# Patient Record
Sex: MALE | Race: WHITE | NOT HISPANIC OR LATINO | Employment: UNEMPLOYED | URBAN - METROPOLITAN AREA
[De-identification: names, ages, dates, MRNs, and addresses within clinical notes are randomized per-mention and may not be internally consistent; named-entity substitution may affect disease eponyms.]

---

## 2017-01-03 ENCOUNTER — MYC MEDICAL ADVICE (OUTPATIENT)
Dept: ADDICTION MEDICINE | Facility: CLINIC | Age: 37
End: 2017-01-03

## 2017-01-03 NOTE — TELEPHONE ENCOUNTER
My chart message from patient on 01/03/2016    Dr Barfield,    According to the Vivitrol website my provider, you, should be able to provide me with a medical bracelet or necklace stating I am on Vivitrol. DO you have these, can I get one at my next appointment on 1/10?    I called [phone] and they will not send directly to me, only to you.    Thank you!    Ashish Ferreira  [phone]

## 2017-01-04 NOTE — TELEPHONE ENCOUNTER
Sent the patient a [employer] message that a representative from [employer] will be dropping off bracelets at our office.  He has an appointment with me on 1/10/2017.     Analy Barfield MD

## 2017-01-10 ENCOUNTER — OFFICE VISIT (OUTPATIENT)
Dept: ADDICTION MEDICINE | Facility: CLINIC | Age: 37
End: 2017-01-10
Payer: COMMERCIAL

## 2017-01-10 VITALS
OXYGEN SATURATION: 100 % | TEMPERATURE: 97.9 F | HEIGHT: 68 IN | RESPIRATION RATE: 16 BRPM | WEIGHT: 159.5 LBS | BODY MASS INDEX: 24.17 KG/M2 | DIASTOLIC BLOOD PRESSURE: 68 MMHG | HEART RATE: 68 BPM | SYSTOLIC BLOOD PRESSURE: 139 MMHG

## 2017-01-10 DIAGNOSIS — F10.21 ALCOHOL USE DISORDER, SEVERE, IN EARLY REMISSION, IN CONTROLLED ENVIRONMENT, DEPENDENCE (H): Primary | ICD-10-CM

## 2017-01-10 DIAGNOSIS — F41.9 ANXIETY: ICD-10-CM

## 2017-01-10 DIAGNOSIS — Z79.899 ENCOUNTER FOR LONG-TERM (CURRENT) USE OF HIGH-RISK MEDICATION: ICD-10-CM

## 2017-01-10 PROCEDURE — 99214 OFFICE O/P EST MOD 30 MIN: CPT | Performed by: ANESTHESIOLOGY

## 2017-01-10 RX ORDER — NALTREXONE HYDROCHLORIDE 50 MG/1
50 TABLET, FILM COATED ORAL DAILY
Qty: 30 TABLET | Refills: 6 | Status: SHIPPED | OUTPATIENT
Start: 2017-01-10 | End: 2017-07-26

## 2017-01-10 NOTE — MR AVS SNAPSHOT
After Visit Summary   1/10/2017    Dariusz Ferreira    MRN: [id number]           Patient Information     Date Of Birth          1980        Visit Information        Provider Department      1/10/2017 8:00 AM Analy Barfield MD [facility]        Today's Diagnoses     Alcohol use disorder, severe, in early remission, in controlled environment, dependence (H)    -  1     Anxiety         Encounter for long-term (current) use of high-risk medication            Follow-ups after your visit        Who to contact     If you have questions or need follow up information about today's clinic visit or your schedule please contact [facility] directly at [phone].  Normal or non-critical lab and imaging results will be communicated to you by MyChart, letter or phone within 4 business days after the clinic has received the results. If you do not hear from us within 7 days, please contact the clinic through [employer] or phone. If you have a critical or abnormal lab result, we will notify you by phone as soon as possible.  Submit refill requests through [facility] or call your pharmacy and they will forward the refill request to us. Please allow 3 business days for your refill to be completed.          Additional Information About Your Visit        MyChart Information     [facility] gives you secure access to your electronic health record. If you see a primary care provider, you can also send messages to your care team and make appointments. If you have questions, please call your primary care clinic.  If you do not have a primary care provider, please call [phone] and they will assist you.        Care EveryWhere ID     This is your Care EveryWhere ID. This could be used by other organizations to access your [place] medical records  [id number]        Your Vitals Were     Pulse Temperature Respirations Height BMI (Body Mass Index) Pulse Oximetry    68  "97.9  F (36.6  C) (Oral) 16 5' 8\" (1.727 m) 24.26 kg/m2 100%       Blood Pressure from Last 3 Encounters:   01/10/17 139/68   12/22/16 126/62   12/13/16 115/80    Weight from Last 3 Encounters:   01/10/17 159 lb 8 oz (72.349 kg)   12/13/16 160 lb (72.576 kg)   09/05/16 155 lb (70.308 kg)              Today, you had the following     No orders found for display         Today's Medication Changes          These changes are accurate as of: 1/10/17  9:38 AM.  If you have any questions, ask your nurse or doctor.               Start taking these medicines.        Dose/Directions    naltrexone 50 MG tablet   Commonly known as:  DEPADE;REVIA   Used for:  Alcohol use disorder, severe, in early remission, in controlled environment, dependence (H)   Started by:  Analy Barfield MD        Dose:  50 mg   Take 1 tablet (50 mg) by mouth daily   Quantity:  30 tablet   Refills:  6            Where to get your medicines      These medications were sent to [id number] - [place], [place] - [address] AT [place] [address]  [address], [place] [address]     Phone:  [phone]    - naltrexone 50 MG tablet             Primary Care Provider Office Phone # Fax #    [facility] [phone]       [facility] [address] [address]        Thank you!     Thank you for choosing [facility] PRIMARY CARE  for your care. Our goal is always to provide you with excellent care. Hearing back from our patients is one way we can continue to improve our services. Please take a few minutes to complete the written survey that you may receive in the mail after your visit with us. Thank you!             Your Updated Medication List - Protect others around you: Learn how to safely use, store and throw away your medicines at www.disposemymeds.org.          This list is accurate as of: 1/10/17  9:38 AM.  Always use your most recent med list.                   Brand Name " Dispense Instructions for use    citalopram 20 MG tablet    celeXA    180 tablet    Take 2 tablets (40 mg) by mouth daily       emtricitabine-tenofovir 200-300 MG per tablet    TRUVADA     Take 1 tablet by mouth daily       LORazepam 0.5 MG tablet    ATIVAN    90 tablet    Take 1 tablet (0.5 mg) by mouth as needed 1-2 tablets up to 3 times per day.       M-VIT PO      1 tablet daily       naltrexone 50 MG tablet    DEPADE;REVIA    30 tablet    Take 1 tablet (50 mg) by mouth daily
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
## 2017-01-18 ENCOUNTER — INFUSION THERAPY VISIT (OUTPATIENT)
Dept: INFUSION THERAPY | Facility: CLINIC | Age: 37
End: 2017-01-18
Attending: ANESTHESIOLOGY
Payer: COMMERCIAL

## 2017-01-18 VITALS
RESPIRATION RATE: 16 BRPM | SYSTOLIC BLOOD PRESSURE: 100 MMHG | HEART RATE: 69 BPM | DIASTOLIC BLOOD PRESSURE: 44 MMHG | TEMPERATURE: 98.1 F

## 2017-01-18 DIAGNOSIS — F10.21 ALCOHOL USE DISORDER, SEVERE, IN EARLY REMISSION, IN CONTROLLED ENVIRONMENT, DEPENDENCE (H): Primary | ICD-10-CM

## 2017-01-18 PROCEDURE — 96372 THER/PROPH/DIAG INJ SC/IM: CPT

## 2017-01-18 PROCEDURE — 25000128 H RX IP 250 OP 636: Performed by: ANESTHESIOLOGY

## 2017-01-18 RX ADMIN — NALTREXONE 380 MG: KIT at 10:30

## 2017-01-18 NOTE — PROGRESS NOTES
Has had a good month. No new health issues. Tolerated injection. Left ambulatory when discharged. To return at next appointment.

## 2017-02-15 ENCOUNTER — INFUSION THERAPY VISIT (OUTPATIENT)
Dept: INFUSION THERAPY | Facility: CLINIC | Age: 37
End: 2017-02-15
Attending: ANESTHESIOLOGY
Payer: COMMERCIAL

## 2017-02-15 DIAGNOSIS — F10.21 ALCOHOL USE DISORDER, SEVERE, IN EARLY REMISSION, IN CONTROLLED ENVIRONMENT, DEPENDENCE (H): Primary | ICD-10-CM

## 2017-02-15 PROCEDURE — 25000128 H RX IP 250 OP 636: Performed by: ANESTHESIOLOGY

## 2017-02-15 PROCEDURE — 96372 THER/PROPH/DIAG INJ SC/IM: CPT

## 2017-02-15 RX ADMIN — NALTREXONE 380 MG: KIT at 15:05

## 2017-02-15 NOTE — PROGRESS NOTES
Pt here for vivitrol inj.  States it works well for him, but wears off before the month is up so he takes PO the last week.  Med injected into R glut without difficulty.  RTC in 4 weeks.

## 2017-02-15 NOTE — MR AVS SNAPSHOT
After Visit Summary   2/15/2017    Dariusz Ferreira    MRN: [id number]           Patient Information     Date Of Birth          1980        Visit Information        Provider Department      2/15/2017 2:30 PM UR [facility] 02 [facility], Infusion Services        Today's Diagnoses     Alcohol use disorder, severe, in early remission, in controlled environment, dependence (H)    -  1       Follow-ups after your visit        Your next 10 appointments already scheduled     Apr 10, 2017 10:00 AM CDT   Return Visit with Analy Barfield MD   [facility] ([facility])    [address]  [address]  [facility] [address] [phone]              Who to contact     If you have questions or need follow up information about today's clinic visit or your schedule please contact [facility], [facility] SERVICES directly at [phone].  Normal or non-critical lab and imaging results will be communicated to you by MyChart, letter or phone within 4 business days after the clinic has received the results. If you do not hear from us within 7 days, please contact the clinic through [employer] or phone. If you have a critical or abnormal lab result, we will notify you by phone as soon as possible.  Submit refill requests through [employer] or call your pharmacy and they will forward the refill request to us. Please allow 3 business days for your refill to be completed.          Additional Information About Your Visit        MyChart Information     [employer] gives you secure access to your electronic health record. If you see a primary care provider, you can also send messages to your care team and make appointments. If you have questions, please call your primary care clinic.  If you do not have a primary care provider, please call [phone] and they will assist you.        Care EveryWhere ID     This is your Care EveryWhere ID. This could be used by other  organizations to access your [place] medical records  [id number]         Blood Pressure from Last 3 Encounters:   01/18/17 100/44   01/10/17 139/68   12/22/16 126/62    Weight from Last 3 Encounters:   01/10/17 72.3 kg (159 lb 8 oz)   12/13/16 72.6 kg (160 lb)   09/05/16 70.3 kg (155 lb)              Today, you had the following     No orders found for display       Primary Care Provider Office Phone # Fax #    [facility] [phone]       [facility] [place] [address]        Thank you!     Thank you for choosing [facility], [facility] SERVICES  for your care. Our goal is always to provide you with excellent care. Hearing back from our patients is one way we can continue to improve our services. Please take a few minutes to complete the written survey that you may receive in the mail after your visit with us. Thank you!             Your Updated Medication List - Protect others around you: Learn how to safely use, store and throw away your medicines at www.disposemymeds.org.          This list is accurate as of: 2/15/17  3:14 PM.  Always use your most recent med list.                   Brand Name Dispense Instructions for use    citalopram 20 MG tablet    celeXA    180 tablet    Take 2 tablets (40 mg) by mouth daily       emtricitabine-tenofovir 200-300 MG per tablet    TRUVADA     Take 1 tablet by mouth daily       LORazepam 0.5 MG tablet    ATIVAN    90 tablet    Take 1 tablet (0.5 mg) by mouth as needed 1-2 tablets up to 3 times per day.       M-VIT PO      1 tablet daily       naltrexone 50 MG tablet    DEPADE;REVIA    30 tablet    Take 1 tablet (50 mg) by mouth daily

## 2017-03-14 ENCOUNTER — INFUSION THERAPY VISIT (OUTPATIENT)
Dept: INFUSION THERAPY | Facility: CLINIC | Age: 37
End: 2017-03-14
Attending: ANESTHESIOLOGY
Payer: COMMERCIAL

## 2017-03-14 VITALS
SYSTOLIC BLOOD PRESSURE: 117 MMHG | TEMPERATURE: 98.4 F | DIASTOLIC BLOOD PRESSURE: 57 MMHG | HEART RATE: 63 BPM | OXYGEN SATURATION: 100 %

## 2017-03-14 DIAGNOSIS — F10.21 ALCOHOL USE DISORDER, SEVERE, IN EARLY REMISSION, IN CONTROLLED ENVIRONMENT, DEPENDENCE (H): Primary | ICD-10-CM

## 2017-03-14 PROCEDURE — 25000128 H RX IP 250 OP 636: Performed by: ANESTHESIOLOGY

## 2017-03-14 PROCEDURE — 96372 THER/PROPH/DIAG INJ SC/IM: CPT

## 2017-03-14 RX ADMIN — NALTREXONE 380 MG: KIT at 12:06

## 2017-03-14 NOTE — MR AVS SNAPSHOT
After Visit Summary   3/14/2017    Dariusz Ferreira    MRN: [id number]           Patient Information     Date Of Birth          1980        Visit Information        Provider Department      3/14/2017 12:00 PM UR [facility] 05 [facility], Infusion Services        Today's Diagnoses     Alcohol use disorder, severe, in early remission, in controlled environment, dependence (H)    -  1       Follow-ups after your visit        Your next 10 appointments already scheduled     Apr 10, 2017 10:00 AM CDT   Return Visit with Analy Barfield MD   [facility] ([facility])    [address]  [address]  [facility] [address] [phone]              Who to contact     If you have questions or need follow up information about today's clinic visit or your schedule please contact [facility], [facility] SERVICES directly at [phone].  Normal or non-critical lab and imaging results will be communicated to you by MyChart, letter or phone within 4 business days after the clinic has received the results. If you do not hear from us within 7 days, please contact the clinic through [employer] or phone. If you have a critical or abnormal lab result, we will notify you by phone as soon as possible.  Submit refill requests through [employer] or call your pharmacy and they will forward the refill request to us. Please allow 3 business days for your refill to be completed.          Additional Information About Your Visit        MyChart Information     [employer] gives you secure access to your electronic health record. If you see a primary care provider, you can also send messages to your care team and make appointments. If you have questions, please call your primary care clinic.  If you do not have a primary care provider, please call [phone] and they will assist you.        Care EveryWhere ID     This is your Care EveryWhere ID. This could be used by other  organizations to access your [place] medical records  [id number]        Your Vitals Were     Pulse Temperature Pulse Oximetry             63 98.4  F (36.9  C) 100%          Blood Pressure from Last 3 Encounters:   03/14/17 117/57   01/18/17 100/44   01/10/17 139/68    Weight from Last 3 Encounters:   01/10/17 72.3 kg (159 lb 8 oz)   12/13/16 72.6 kg (160 lb)   09/05/16 70.3 kg (155 lb)              We Performed the Following     MD Instruction for Therapy Plan        Primary Care Provider Office Phone # Fax #    [facility] [phone]       [address] [place] [address]        Thank you!     Thank you for choosing [facility], [facility] SERVICES  for your care. Our goal is always to provide you with excellent care. Hearing back from our patients is one way we can continue to improve our services. Please take a few minutes to complete the written survey that you may receive in the mail after your visit with us. Thank you!             Your Updated Medication List - Protect others around you: Learn how to safely use, store and throw away your medicines at www.disposemymeds.org.          This list is accurate as of: 3/14/17 12:10 PM.  Always use your most recent med list.                   Brand Name Dispense Instructions for use    citalopram 20 MG tablet    celeXA    180 tablet    Take 2 tablets (40 mg) by mouth daily       emtricitabine-tenofovir 200-300 MG per tablet    TRUVADA     Take 1 tablet by mouth daily       LORazepam 0.5 MG tablet    ATIVAN    90 tablet    Take 1 tablet (0.5 mg) by mouth as needed 1-2 tablets up to 3 times per day.       M-VIT PO      1 tablet daily       naltrexone 50 MG tablet    DEPADE;REVIA    30 tablet    Take 1 tablet (50 mg) by mouth daily

## 2017-03-14 NOTE — PROGRESS NOTES
"Infusion Nursing Note:  Dariusz Ferreira presents today for naltrexone injection.    Patient seen by provider today: No   present during visit today: Not Applicable.    Note: 2 \" needle used .    Intravenous Access:  No Intravenous access/labs at this visit.    Treatment Conditions:  Not Applicable.      Post Infusion Assessment:  Patient tolerated injection without incident.    Discharge Plan:   Patient discharged in stable condition accompanied by: self.  Departure Mode: Ambulatory.    Merlene Valdes RN                        "

## 2017-04-18 ENCOUNTER — OFFICE VISIT (OUTPATIENT)
Dept: ADDICTION MEDICINE | Facility: CLINIC | Age: 37
End: 2017-04-18
Payer: COMMERCIAL

## 2017-04-18 ENCOUNTER — INFUSION THERAPY VISIT (OUTPATIENT)
Dept: INFUSION THERAPY | Facility: CLINIC | Age: 37
End: 2017-04-18
Attending: ANESTHESIOLOGY
Payer: COMMERCIAL

## 2017-04-18 VITALS
TEMPERATURE: 98.5 F | WEIGHT: 160.5 LBS | BODY MASS INDEX: 24.4 KG/M2 | SYSTOLIC BLOOD PRESSURE: 118 MMHG | RESPIRATION RATE: 16 BRPM | OXYGEN SATURATION: 97 % | HEART RATE: 89 BPM | DIASTOLIC BLOOD PRESSURE: 62 MMHG

## 2017-04-18 VITALS — TEMPERATURE: 98.3 F

## 2017-04-18 DIAGNOSIS — F10.21 ALCOHOL USE DISORDER, SEVERE, IN EARLY REMISSION, IN CONTROLLED ENVIRONMENT, DEPENDENCE (H): Primary | ICD-10-CM

## 2017-04-18 DIAGNOSIS — Z79.899 ENCOUNTER FOR LONG-TERM (CURRENT) USE OF HIGH-RISK MEDICATION: ICD-10-CM

## 2017-04-18 DIAGNOSIS — F41.9 ANXIETY: ICD-10-CM

## 2017-04-18 DIAGNOSIS — F10.21 ALCOHOL USE DISORDER, SEVERE, IN SUSTAINED REMISSION (H): Primary | ICD-10-CM

## 2017-04-18 PROCEDURE — 96372 THER/PROPH/DIAG INJ SC/IM: CPT

## 2017-04-18 PROCEDURE — 25000128 H RX IP 250 OP 636: Performed by: ANESTHESIOLOGY

## 2017-04-18 PROCEDURE — 99214 OFFICE O/P EST MOD 30 MIN: CPT | Performed by: ANESTHESIOLOGY

## 2017-04-18 RX ADMIN — NALTREXONE 380 MG: KIT at 14:16

## 2017-04-18 NOTE — PROGRESS NOTES
SUBJECTIVE:                                                    Dariusz Ferreira is a 36 year old male who presents to clinic today for the following health issues:    ALCOHOL USE DISORDER - VIVITROL MAINTENANCE FOLLOW UP: last visit 1/10/2017  LAST INJECTION:  #6 3/4/2017  TOTAL # INJECTIONS: #7 due today  SOBRIETY DATE: 9/24/2016  BRIEF HPI: 36 YEAR OLD male with EtOh use disorder and benzodiazepine use disorder.  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  On vivitrol maintenance. Went to [facility] inpatient for 32 days and now doing intensive outpatient. PMHx significant for anxiety and mild depression  TODAY HE REPORTS:  - doing well, he can tell when the medication wears off and has been using oral naltrexone to bridge.   - Relapsed once while on vacation with his his  who is an enabler.  He felt very ashamed and did not want to drink the next day which is new for him.  In the past, off vivitrol, he would have continued to drink.    - intensive outpatient treatment at [facility]  - not taking Ativan anymore.  Was able to discontinue without difficulty.  He still has a prescription at home from his PCP Dr. Duke but he is not using it.     Status since last visit:    Since last visit patient has been: doing well.     Intensity:     There has been: mild cravings    Progression of Symptoms:     Cues to use and relapse triggers: none    Recovery program has been: solid.   Accompanying Signs & Symptoms:    Side Effects: none.    Sobriety:     Status: no use since last visit.      Drug Screen: patient willing but screen unnecessary.   Precipitating factors:    Triggers have been: mild.   Alleviating factors:    Contact with sponsor has been: no sponsor.     Family and support system has been: helpful.   Other Therapies Tried :     Patient has been going to recovery meetings:regularly.      [place] Board of Pharmacy Data Base Reviewed:    YES; appropriate    Problem list  and histories reviewed & adjusted, as indicated.  Additional history: as documented    Patient Active Problem List   Diagnosis     Other specified conditions of the tongue     Tobacco use disorder     Anxiety     Mild major depression (H)     CARDIOVASCULAR SCREENING; LDL GOAL LESS THAN 160     Rib contusion     Hypertension     Alcohol use disorder, severe, in early remission, in controlled environment, dependence (H)     Past Surgical History:   Procedure Laterality Date     mucous cyst removed from tongue  2003     TONSILLECTOMY  1987       Social History   Substance Use Topics     Smoking status: Current Some Day Smoker     Packs/day: 0.50     Years: 8.00     Types: Cigarettes     Smokeless tobacco: Never Used      Comment: 5/28/13 - using chantix, smokes when he drinks     Alcohol use Yes      Comment: review     Family History   Problem Relation Age of Onset     Hypertension Mother      Alcohol/Drug Mother      Blood Disease Mother      Depression Mother      Respiratory Mother      asthma     CEREBROVASCULAR DISEASE Mother      CEREBROVASCULAR DISEASE Maternal Grandfather      Alcohol/Drug Father      Alcohol/Drug Sister      Blood Disease Mother      anemia     Thyroid Disease Sister      uncertain what type, sounds like Grave's disease         Current Outpatient Prescriptions   Medication Sig Dispense Refill     naltrexone (DEPADE;REVIA) 50 MG tablet Take 1 tablet (50 mg) by mouth daily 30 tablet 6     emtricitabine-tenofovir (TRUVADA) 200-300 MG per tablet Take 1 tablet by mouth daily       citalopram (CELEXA) 20 MG tablet Take 2 tablets (40 mg) by mouth daily 180 tablet 1     LORazepam (ATIVAN) 0.5 MG tablet Take 1 tablet (0.5 mg) by mouth as needed 1-2 tablets up to 3 times per day. 90 tablet 0     M-VIT OR 1 tablet daily        Allergies   Allergen Reactions     Penicillins      Erythromycin Rash     Labs reviewed in EPIC    OBJECTIVE:                                                    /62  Pulse  89  Temp 98.5  F (36.9  C) (Oral)  Resp 16  Wt 160 lb 8 oz (72.8 kg)  SpO2 97%  BMI 24.4 kg/m2  Body mass index is 24.4 kg/(m^2).     ROS:  Constitutional, neuro, ENT, endocrine, pulmonary, cardiac, gastrointestinal, genitourinary, musculoskeletal, integument and psychiatric systems are negative, except as otherwise noted.    EXAM:  GENERAL APPEARANCE: healthy, alert and no distress  EYES: Eyes grossly normal to inspection, PERRL and conjunctivae and sclerae normal  MS: extremities normal- no gross deformities noted  PSYCH: mentation appears normal and affect normal/bright  MENTAL STATUS EXAM:  Appearance/Behavior: No apparent distress and Neatly groomed  Speech: Normal  Mood/Affect: normal affect  Insight: Adequate    Diagnostic Test Results:  No results found for this or any previous visit (from the past 24 hour(s)).     ASSESSMENT:                                                    ALCOHOL USE DISORDER     ANXIETY    ENCOUNTER FOR LONG TERM USE OF HIGH RISK MEDICATION   High Risk Drug Monitoring?  YES   Drug being monitored: NALTREXONE/VIVITROL   Reason for drug: Alcohol Use Disorder   What is being monitored?: Dosage, Cravings, Trigger, side effects, and continued abstinence.       PLAN:                                                        ICD-10-CM    1. Alcohol use disorder, severe, in sustained remission (H) F10.21    2. Anxiety F41.9    3. Encounter for long-term (current) use of high-risk medication Z79.899          MEDICATIONS:   No orders of the defined types were placed in this encounter.         - Continue other medications without change  FUTURE APPOINTMENTS:       - Follow-up visit in 3 months    Vulnerability to opioid overdose: Following a person s treatment with extended-release  injectable naltrexone, his or her opioid tolerance is reduced from pretreatment  baseline, and patients are vulnerable to potentially fatal overdose at the end of a  dosing interval, after missing a dose, or after  discontinuing treatment. Attempts to  overcome the blockade may also lead to fatal overdose. This was communicated to the patient who understands this risk and wishes to proceed with the injection.     Analy Barfield MD  [facility]

## 2017-04-18 NOTE — MR AVS SNAPSHOT
After Visit Summary   4/18/2017    Dariusz Ferreira    MRN: [id number]           Patient Information     Date Of Birth          1980        Visit Information        Provider Department      4/18/2017 2:00 PM UR CH 01 [facility] [place], Infusion Services        Today's Diagnoses     Alcohol use disorder, severe, in early remission, in controlled environment, dependence (H)    -  1       Follow-ups after your visit        Who to contact     If you have questions or need follow up information about today's clinic visit or your schedule please contact [facility] directly at [phone].  Normal or non-critical lab and imaging results will be communicated to you by Grand Perfectahart, letter or phone within 4 business days after the clinic has received the results. If you do not hear from us within 7 days, please contact the clinic through WhipCart or phone. If you have a critical or abnormal lab result, we will notify you by phone as soon as possible.  Submit refill requests through Excep Apps or call your pharmacy and they will forward the refill request to us. Please allow 3 business days for your refill to be completed.          Additional Information About Your Visit        MyChart Information     Excep Apps gives you secure access to your electronic health record. If you see a primary care provider, you can also send messages to your care team and make appointments. If you have questions, please call your primary care clinic.  If you do not have a primary care provider, please call [phone] and they will assist you.        Care EveryWhere ID     This is your Care EveryWhere ID. This could be used by other organizations to access your [place] medical records  [id number]        Your Vitals Were     Temperature                   98.3  F (36.8  C)            Blood Pressure from Last 3 Encounters:   04/18/17 118/62   03/14/17 117/57   01/18/17 100/44    Weight from Last 3 Encounters:    04/18/17 72.8 kg (160 lb 8 oz)   01/10/17 72.3 kg (159 lb 8 oz)   12/13/16 72.6 kg (160 lb)              Today, you had the following     No orders found for display       Primary Care Provider Office Phone # Fax #    [facility] [phone]       [address] [place] [address]        Thank you!     Thank you for choosing [facility] SERVICES  for your care. Our goal is always to provide you with excellent care. Hearing back from our patients is one way we can continue to improve our services. Please take a few minutes to complete the written survey that you may receive in the mail after your visit with us. Thank you!             Your Updated Medication List - Protect others around you: Learn how to safely use, store and throw away your medicines at www.disposemymeds.org.          This list is accurate as of: 4/18/17  2:18 PM.  Always use your most recent med list.                   Brand Name Dispense Instructions for use    citalopram 20 MG tablet    celeXA    180 tablet    Take 2 tablets (40 mg) by mouth daily       emtricitabine-tenofovir 200-300 MG per tablet    TRUVADA     Take 1 tablet by mouth daily       LORazepam 0.5 MG tablet    ATIVAN    90 tablet    Take 1 tablet (0.5 mg) by mouth as needed 1-2 tablets up to 3 times per day.       M-VIT PO      1 tablet daily       naltrexone 50 MG tablet    DEPADE;REVIA    30 tablet    Take 1 tablet (50 mg) by mouth daily

## 2017-04-18 NOTE — MR AVS SNAPSHOT
After Visit Summary   4/18/2017    Dariusz Ferreira    MRN: [id number]           Patient Information     Date Of Birth          1980        Visit Information        Provider Department      4/18/2017 1:20 PM Analy Barfield MD [facility]        Today's Diagnoses     Alcohol use disorder, severe, in sustained remission (H)    -  1    Anxiety        Encounter for long-term (current) use of high-risk medication           Follow-ups after your visit        Your next 10 appointments already scheduled     Apr 18, 2017  2:00 PM CDT   Level O with UR CH 01   [facility], Infusion Services ([facility])    [address] [place] [address]   [phone]              Who to contact     If you have questions or need follow up information about today's clinic visit or your schedule please contact [facility] PRIMARY CARE directly at [phone].  Normal or non-critical lab and imaging results will be communicated to you by MyChart, letter or phone within 4 business days after the clinic has received the results. If you do not hear from us within 7 days, please contact the clinic through Big Apple Insurance Solutionshart or phone. If you have a critical or abnormal lab result, we will notify you by phone as soon as possible.  Submit refill requests through Wavemark or call your pharmacy and they will forward the refill request to us. Please allow 3 business days for your refill to be completed.          Additional Information About Your Visit        MyChart Information     Wavemark gives you secure access to your electronic health record. If you see a primary care provider, you can also send messages to your care team and make appointments. If you have questions, please call your primary care clinic.  If you do not have a primary care provider, please call [phone] and they will assist you.        Care EveryWhere ID      This is your Care EveryWhere ID. This could be used by other organizations to access your [place] medical records  [id number]        Your Vitals Were     Pulse Temperature Respirations Pulse Oximetry BMI (Body Mass Index)       89 98.5  F (36.9  C) (Oral) 16 97% 24.4 kg/m2        Blood Pressure from Last 3 Encounters:   04/18/17 118/62   03/14/17 117/57   01/18/17 100/44    Weight from Last 3 Encounters:   04/18/17 160 lb 8 oz (72.8 kg)   01/10/17 159 lb 8 oz (72.3 kg)   12/13/16 160 lb (72.6 kg)              Today, you had the following     No orders found for display       Primary Care Provider Office Phone # Fax #    [facility] [phone]       [facility] [id number]        Thank you!     Thank you for choosing [facility] PRIMARY CARE  for your care. Our goal is always to provide you with excellent care. Hearing back from our patients is one way we can continue to improve our services. Please take a few minutes to complete the written survey that you may receive in the mail after your visit with us. Thank you!             Your Updated Medication List - Protect others around you: Learn how to safely use, store and throw away your medicines at www.disposemymeds.org.          This list is accurate as of: 4/18/17  1:51 PM.  Always use your most recent med list.                   Brand Name Dispense Instructions for use    citalopram 20 MG tablet    celeXA    180 tablet    Take 2 tablets (40 mg) by mouth daily       emtricitabine-tenofovir 200-300 MG per tablet    TRUVADA     Take 1 tablet by mouth daily       LORazepam 0.5 MG tablet    ATIVAN    90 tablet    Take 1 tablet (0.5 mg) by mouth as needed 1-2 tablets up to 3 times per day.       M-VIT PO      1 tablet daily       naltrexone 50 MG tablet    DEPADE;REVIA    30 tablet    Take 1 tablet (50 mg) by mouth daily

## 2017-04-18 NOTE — PROGRESS NOTES
"Infusion Nursing Note:  Dariusz Ferreira presents today for naltrexone injection.    Patient seen by provider today: Yes: Lico   present during visit today: Not Applicable.    Note: 2\" needle used.    Intravenous Access:  No Intravenous access/labs at this visit.    Treatment Conditions:  Not Applicable.      Post Infusion Assessment:  Patient tolerated injection without incident.    Discharge Plan:   Patient discharged in stable condition accompanied by: friend.  Departure Mode: Ambulatory.    Merlene Valdes RN                        "

## 2017-05-19 ENCOUNTER — INFUSION THERAPY VISIT (OUTPATIENT)
Dept: INFUSION THERAPY | Facility: CLINIC | Age: 37
End: 2017-05-19
Attending: ANESTHESIOLOGY
Payer: COMMERCIAL

## 2017-05-19 VITALS
DIASTOLIC BLOOD PRESSURE: 69 MMHG | SYSTOLIC BLOOD PRESSURE: 114 MMHG | RESPIRATION RATE: 20 BRPM | TEMPERATURE: 98.1 F | HEART RATE: 57 BPM

## 2017-05-19 DIAGNOSIS — F10.21 ALCOHOL USE DISORDER, SEVERE, IN EARLY REMISSION, IN CONTROLLED ENVIRONMENT, DEPENDENCE (H): Primary | ICD-10-CM

## 2017-05-19 PROCEDURE — 96372 THER/PROPH/DIAG INJ SC/IM: CPT

## 2017-05-19 PROCEDURE — 25000128 H RX IP 250 OP 636: Performed by: ANESTHESIOLOGY

## 2017-05-19 RX ADMIN — NALTREXONE 380 MG: KIT at 10:13

## 2017-05-19 NOTE — PROGRESS NOTES
Here for injection. Is in treatment. Has not used alcohol since last relapse. Denies pain. Tolerated injection. To return to next appointment.

## 2017-05-19 NOTE — MR AVS SNAPSHOT
After Visit Summary   5/19/2017    Dariusz Ferreira    MRN: [id number]           Patient Information     Date Of Birth          1980        Visit Information        Provider Department      5/19/2017 9:30 AM UR CH 02 [facility], Infusion Services        Today's Diagnoses     Alcohol use disorder, severe, in early remission, in controlled environment, dependence (H)    -  1       Follow-ups after your visit        Your next 10 appointments already scheduled     Jun 16, 2017 12:00 PM CDT   Level O with UR CH 03   [facility], Infusion Services ([facility])    [address] [place] [address]   [phone]              Who to contact     If you have questions or need follow up information about today's clinic visit or your schedule please contact [facility] directly at [phone].  Normal or non-critical lab and imaging results will be communicated to you by MyChart, letter or phone within 4 business days after the clinic has received the results. If you do not hear from us within 7 days, please contact the clinic through Arkados Grouphart or phone. If you have a critical or abnormal lab result, we will notify you by phone as soon as possible.  Submit refill requests through Zeis Excelsa or call your pharmacy and they will forward the refill request to us. Please allow 3 business days for your refill to be completed.          Additional Information About Your Visit        MyChart Information     Zeis Excelsa gives you secure access to your electronic health record. If you see a primary care provider, you can also send messages to your care team and make appointments. If you have questions, please call your primary care clinic.  If you do not have a primary care provider, please call [phone] and they will assist you.        Care EveryWhere ID     This is your Care EveryWhere ID. This could be used by other  organizations to access your [place] medical records  [id number]        Your Vitals Were     Pulse Temperature Respirations             57 98.1  F (36.7  C) (Oral) 20          Blood Pressure from Last 3 Encounters:   05/19/17 114/69   04/18/17 118/62   03/14/17 117/57    Weight from Last 3 Encounters:   04/18/17 72.8 kg (160 lb 8 oz)   01/10/17 72.3 kg (159 lb 8 oz)   12/13/16 72.6 kg (160 lb)              Today, you had the following     No orders found for display       Primary Care Provider Office Phone # Fax #    [facility] [phone]       [address] [place] [address]        Thank you!     Thank you for choosing [facility] SERVICES  for your care. Our goal is always to provide you with excellent care. Hearing back from our patients is one way we can continue to improve our services. Please take a few minutes to complete the written survey that you may receive in the mail after your visit with us. Thank you!             Your Updated Medication List - Protect others around you: Learn how to safely use, store and throw away your medicines at www.disposemymeds.org.          This list is accurate as of: 5/19/17 10:30 AM.  Always use your most recent med list.                   Brand Name Dispense Instructions for use    citalopram 20 MG tablet    celeXA    180 tablet    Take 2 tablets (40 mg) by mouth daily       emtricitabine-tenofovir 200-300 MG per tablet    TRUVADA     Take 1 tablet by mouth daily       LORazepam 0.5 MG tablet    ATIVAN    90 tablet    Take 1 tablet (0.5 mg) by mouth as needed 1-2 tablets up to 3 times per day.       M-VIT PO      1 tablet daily       naltrexone 50 MG tablet    DEPADE;REVIA    30 tablet    Take 1 tablet (50 mg) by mouth daily

## 2017-06-20 ENCOUNTER — INFUSION THERAPY VISIT (OUTPATIENT)
Dept: INFUSION THERAPY | Facility: CLINIC | Age: 37
End: 2017-06-20
Attending: ANESTHESIOLOGY
Payer: COMMERCIAL

## 2017-06-20 VITALS
OXYGEN SATURATION: 97 % | HEART RATE: 67 BPM | DIASTOLIC BLOOD PRESSURE: 74 MMHG | SYSTOLIC BLOOD PRESSURE: 117 MMHG | TEMPERATURE: 98.4 F | RESPIRATION RATE: 16 BRPM

## 2017-06-20 DIAGNOSIS — F10.21 ALCOHOL USE DISORDER, SEVERE, IN EARLY REMISSION, IN CONTROLLED ENVIRONMENT, DEPENDENCE (H): Primary | ICD-10-CM

## 2017-06-20 PROCEDURE — 96372 THER/PROPH/DIAG INJ SC/IM: CPT

## 2017-06-20 PROCEDURE — 25000128 H RX IP 250 OP 636: Performed by: ANESTHESIOLOGY

## 2017-06-20 RX ADMIN — NALTREXONE 380 MG: KIT at 13:04

## 2017-06-20 ASSESSMENT — PAIN SCALES - GENERAL: PAINLEVEL: NO PAIN (0)

## 2017-06-20 NOTE — MR AVS SNAPSHOT
After Visit Summary   6/20/2017    Dariusz Ferreira    MRN: [id number]           Patient Information     Date Of Birth          1980        Visit Information        Provider Department      6/20/2017 12:00 PM UR CH 04 [facility], Infusion Services        Today's Diagnoses     Alcohol use disorder, severe, in early remission, in controlled environment, dependence (H)    -  1       Follow-ups after your visit        Who to contact     If you have questions or need follow up information about today's clinic visit or your schedule please contact [facility], INFUSION SERVICES directly at [phone].  Normal or non-critical lab and imaging results will be communicated to you by Energatix Studiohart, letter or phone within 4 business days after the clinic has received the results. If you do not hear from us within 7 days, please contact the clinic through Masterson Industriest or phone. If you have a critical or abnormal lab result, we will notify you by phone as soon as possible.  Submit refill requests through Smarter Remarketer or call your pharmacy and they will forward the refill request to us. Please allow 3 business days for your refill to be completed.          Additional Information About Your Visit        MyChart Information     Smarter Remarketer gives you secure access to your electronic health record. If you see a primary care provider, you can also send messages to your care team and make appointments. If you have questions, please call your primary care clinic.  If you do not have a primary care provider, please call [phone] and they will assist you.        Care EveryWhere ID     This is your Care EveryWhere ID. This could be used by other organizations to access your [place] medical records  [id number]        Your Vitals Were     Pulse Temperature Respirations Pulse Oximetry          67 98.4  F (36.9  C) (Oral) 16 97%         Blood Pressure from Last 3 Encounters:   06/20/17 117/74   05/19/17 114/69   04/18/17 118/62     Weight from Last 3 Encounters:   04/18/17 72.8 kg (160 lb 8 oz)   01/10/17 72.3 kg (159 lb 8 oz)   12/13/16 72.6 kg (160 lb)              Today, you had the following     No orders found for display       Primary Care Provider Office Phone # Fax #    [facility] [phone]       [facility] [place] [address]        Thank you!     Thank you for choosing [facility] SERVICES  for your care. Our goal is always to provide you with excellent care. Hearing back from our patients is one way we can continue to improve our services. Please take a few minutes to complete the written survey that you may receive in the mail after your visit with us. Thank you!             Your Updated Medication List - Protect others around you: Learn how to safely use, store and throw away your medicines at www.disposemymeds.org.          This list is accurate as of: 6/20/17  1:46 PM.  Always use your most recent med list.                   Brand Name Dispense Instructions for use    citalopram 20 MG tablet    celeXA    180 tablet    Take 2 tablets (40 mg) by mouth daily       emtricitabine-tenofovir 200-300 MG per tablet    TRUVADA     Take 1 tablet by mouth daily       LAMOTRIGINE PO      Take 100 mg by mouth daily       LORazepam 0.5 MG tablet    ATIVAN    90 tablet    Take 1 tablet (0.5 mg) by mouth as needed 1-2 tablets up to 3 times per day.       M-VIT PO      1 tablet daily       naltrexone 50 MG tablet    DEPADE;REVIA    30 tablet    Take 1 tablet (50 mg) by mouth daily

## 2017-06-20 NOTE — PROGRESS NOTES
"Infusion Nursing Note:  Dariusz Evansias presents today for vivitrol.    Patient seen by provider today: No   present during visit today: Not Applicable.    Note: Patient states he is doing well.  He does take oral naltrexone the last few days of the month.    Intravenous Access:  No Intravenous access/labs at this visit.    Post Infusion Assessment:  Patient tolerated injection without incident.  Vivitrol injection given in right glut with 2\" needle without difficulty.    Discharge Plan:   Patient discharged in stable condition accompanied by: self.  Departure Mode: Ambulatory.  Will return to clinic in 28 days.  Janet Faust RN                        "

## 2017-07-26 ENCOUNTER — INFUSION THERAPY VISIT (OUTPATIENT)
Dept: INFUSION THERAPY | Facility: CLINIC | Age: 37
End: 2017-07-26
Attending: ANESTHESIOLOGY
Payer: COMMERCIAL

## 2017-07-26 ENCOUNTER — MYC MEDICAL ADVICE (OUTPATIENT)
Dept: ADDICTION MEDICINE | Facility: CLINIC | Age: 37
End: 2017-07-26

## 2017-07-26 VITALS
RESPIRATION RATE: 16 BRPM | SYSTOLIC BLOOD PRESSURE: 127 MMHG | DIASTOLIC BLOOD PRESSURE: 81 MMHG | HEART RATE: 83 BPM | TEMPERATURE: 98.4 F

## 2017-07-26 DIAGNOSIS — F10.21 ALCOHOL USE DISORDER, SEVERE, IN EARLY REMISSION, IN CONTROLLED ENVIRONMENT, DEPENDENCE (H): Primary | ICD-10-CM

## 2017-07-26 DIAGNOSIS — F10.21 ALCOHOL USE DISORDER, SEVERE, IN EARLY REMISSION, IN CONTROLLED ENVIRONMENT, DEPENDENCE (H): ICD-10-CM

## 2017-07-26 PROCEDURE — 25000128 H RX IP 250 OP 636: Performed by: ANESTHESIOLOGY

## 2017-07-26 PROCEDURE — 96372 THER/PROPH/DIAG INJ SC/IM: CPT

## 2017-07-26 RX ORDER — NALTREXONE HYDROCHLORIDE 50 MG/1
50 TABLET, FILM COATED ORAL DAILY
Qty: 30 TABLET | Refills: 6 | Status: SHIPPED | OUTPATIENT
Start: 2017-07-26 | End: 2020-11-25

## 2017-07-26 RX ADMIN — NALTREXONE 380 MG: KIT at 13:01

## 2017-07-26 NOTE — TELEPHONE ENCOUNTER
Reply to Dariusz 7/26/17 at 1504:    Good afternoon Dariusz,    We received your request and I will send it out to Dr. Barfield and her partners.    Thank you,    Shima Monge, MSN, RN-BC  Care Coordinator      Will also route to Dr. Heath as Dr. Barfield out of office today.

## 2017-07-26 NOTE — PROGRESS NOTES
Here for injection. No new health issues. Has had a good month. Toleraetd injection. Left ambulatory when discharged. To return at next appointment.

## 2017-07-26 NOTE — MR AVS SNAPSHOT
After Visit Summary   7/26/2017    Dariusz Ferreira    MRN: [id number]           Patient Information     Date Of Birth          1980        Visit Information        Provider Department      7/26/2017 12:30 PM UR CH 03 [facility], Infusion Services        Today's Diagnoses     Alcohol use disorder, severe, in early remission, in controlled environment, dependence (H)    -  1       Follow-ups after your visit        Your next 10 appointments already scheduled     Aug 16, 2017 12:00 PM CDT   Level O with UR CH 04   [facility], Infusion Services ([facility])    [address] [place] [address]   [phone]              Who to contact     If you have questions or need follow up information about today's clinic visit or your schedule please contact [facility], INFUSION SERVICES directly at [phone].  Normal or non-critical lab and imaging results will be communicated to you by MyChart, letter or phone within 4 business days after the clinic has received the results. If you do not hear from us within 7 days, please contact the clinic through Seriosityhart or phone. If you have a critical or abnormal lab result, we will notify you by phone as soon as possible.  Submit refill requests through [employer] or call your pharmacy and they will forward the refill request to us. Please allow 3 business days for your refill to be completed.          Additional Information About Your Visit        MyChart Information     [employer] gives you secure access to your electronic health record. If you see a primary care provider, you can also send messages to your care team and make appointments. If you have questions, please call your primary care clinic.  If you do not have a primary care provider, please call [phone] and they will assist you.        Care EveryWhere ID     This is your Care EveryWhere ID. This could be used by other  organizations to access your [facility] medical records  [id number]        Your Vitals Were     Pulse Temperature Respirations             83 98.4  F (36.9  C) (Oral) 16          Blood Pressure from Last 3 Encounters:   07/26/17 127/81   06/20/17 117/74   05/19/17 114/69    Weight from Last 3 Encounters:   04/18/17 72.8 kg (160 lb 8 oz)   01/10/17 72.3 kg (159 lb 8 oz)   12/13/16 72.6 kg (160 lb)              Today, you had the following     No orders found for display       Primary Care Provider Office Phone # Fax #    [facility] [phone]       [address] [place] [address]        Equal Access to Services     JILL NIXON : Krista lezamao Soolga, waaxda luqadaha, qaybta kaalmada adeegyada, kaitlin cantor. So [facility] [phone].    ATENCIÓN: Si habla español, tiene a roberts disposición servicios gratuitos de asistencia lingüística. Llame al [phone].    We comply with applicable federal civil rights laws and [place] laws. We do not discriminate on the basis of race, color, national origin, age, disability sex, sexual orientation or gender identity.            Thank you!     Thank you for choosing [facility]  for your care. Our goal is always to provide you with excellent care. Hearing back from our patients is one way we can continue to improve our services. Please take a few minutes to complete the written survey that you may receive in the mail after your visit with us. Thank you!             Your Updated Medication List - Protect others around you: Learn how to safely use, store and throw away your medicines at www.disposemymeds.org.          This list is accurate as of: 7/26/17  1:11 PM.  Always use your most recent med list.                   Brand Name Dispense Instructions for use Diagnosis    citalopram 20 MG tablet    celeXA    180 tablet    Take 2 tablets (40 mg) by mouth daily    Anxiety state,  unspecified, Tobacco use disorder, Mild major depression (H)       emtricitabine-tenofovir 200-300 MG per tablet    TRUVADA     Take 1 tablet by mouth daily        LAMOTRIGINE PO      Take 100 mg by mouth daily        LORazepam 0.5 MG tablet    ATIVAN    90 tablet    Take 1 tablet (0.5 mg) by mouth as needed 1-2 tablets up to 3 times per day.    Anxiety state, unspecified, Mild major depression (H)       M-VIT PO      1 tablet daily        naltrexone 50 MG tablet    DEPADE;REVIA    30 tablet    Take 1 tablet (50 mg) by mouth daily    Alcohol use disorder, severe, in early remission, in controlled environment, dependence (H)

## 2017-07-26 NOTE — TELEPHONE ENCOUNTER
Yes, will ask addiction providers to assist with refill.    Dominique Page MD  [facility]

## 2017-07-28 ENCOUNTER — TELEPHONE (OUTPATIENT)
Dept: PALLIATIVE MEDICINE | Facility: CLINIC | Age: 37
End: 2017-07-28

## 2017-07-28 NOTE — TELEPHONE ENCOUNTER
Message replied back to Dr. Barfield on 7/27/17 via [employer]:    I'm not able to change the therapy plan as CMA and with talking with our infusion nurses, they noticed that after his visit yesterday his orders are out so he is in need of new orders/another visit with you. I'll be calling him today to update him.     -Francheska     ---------------------------------------  With new orders, the new therapy plan hopefully should have more clarification stating his plan is every 4 weeks.

## 2017-07-28 NOTE — TELEPHONE ENCOUNTER
"----- Message from Analy Barfield MD sent at 7/26/2017  5:05 PM CDT -----  Regarding: RE: Clarification on plan  He gets vivitrol every 4 weeks.  I am out of town so I am not sure what the provider had in mind with the wording of the order.     I do not think insurance will cover it every 3 weeks?  Please re-word order to say every 4 weeks or 28 days.     Analy Barfield MD   ----- Message -----     From: Francheska Herrera CMA     Sent: 7/26/2017  12:58 PM       To: Analy Barfield MD  Subject: Clarification on plan                            Hi Dr. Barfield,  My name is Francheska and I work in the infusion clinic. We are needing clarification on his Vivitrol plan. His plan states:    \"Day 13 of every 1 month, at least 21 days apart\"    The way it's worded on a scheduling aspect, we aren't sure if he is needing Vivitrol every 3 weeks or 4.     Thank you,  Francheska Herrera CMA    "

## 2017-08-22 ENCOUNTER — INFUSION THERAPY VISIT (OUTPATIENT)
Dept: INFUSION THERAPY | Facility: CLINIC | Age: 37
End: 2017-08-22
Attending: ANESTHESIOLOGY
Payer: COMMERCIAL

## 2017-08-22 VITALS
DIASTOLIC BLOOD PRESSURE: 77 MMHG | RESPIRATION RATE: 16 BRPM | SYSTOLIC BLOOD PRESSURE: 120 MMHG | OXYGEN SATURATION: 98 % | HEART RATE: 69 BPM | TEMPERATURE: 98.4 F

## 2017-08-22 DIAGNOSIS — F10.21 ALCOHOL USE DISORDER, SEVERE, IN EARLY REMISSION, IN CONTROLLED ENVIRONMENT, DEPENDENCE (H): Primary | ICD-10-CM

## 2017-08-22 PROCEDURE — 25000128 H RX IP 250 OP 636: Performed by: ANESTHESIOLOGY

## 2017-08-22 PROCEDURE — 96372 THER/PROPH/DIAG INJ SC/IM: CPT

## 2017-08-22 RX ADMIN — NALTREXONE 380 MG: KIT at 12:37

## 2017-08-22 NOTE — MR AVS SNAPSHOT
After Visit Summary   8/22/2017    Dariusz Ferreira    MRN: [id number]           Patient Information     Date Of Birth          1980        Visit Information        Provider Department      8/22/2017 12:00 PM UR CH 03 [facility], Infusion Services        Today's Diagnoses     Alcohol use disorder, severe, in early remission, in controlled environment, dependence (H)    -  1       Follow-ups after your visit        Who to contact     If you have questions or need follow up information about today's clinic visit or your schedule please contact [facility], INFUSION SERVICES directly at [phone].  Normal or non-critical lab and imaging results will be communicated to you by [employer], letter or phone within 4 business days after the clinic has received the results. If you do not hear from us within 7 days, please contact the clinic through [employer] or phone. If you have a critical or abnormal lab result, we will notify you by phone as soon as possible.  Submit refill requests through [employer] or call your pharmacy and they will forward the refill request to us. Please allow 3 business days for your refill to be completed.          Additional Information About Your Visit        MyChart Information     [employer] gives you secure access to your electronic health record. If you see a primary care provider, you can also send messages to your care team and make appointments. If you have questions, please call your primary care clinic.  If you do not have a primary care provider, please call [phone] and they will assist you.        Care EveryWhere ID     This is your Care EveryWhere ID. This could be used by other organizations to access your [place] medical records  [id number]        Your Vitals Were     Pulse Temperature Respirations Pulse Oximetry          69 98.4  F (36.9  C) 16 98%         Blood Pressure from Last 3 Encounters:   08/22/17 120/77   07/26/17 127/81   06/20/17 117/74     Weight from Last 3 Encounters:   04/18/17 72.8 kg (160 lb 8 oz)   01/10/17 72.3 kg (159 lb 8 oz)   12/13/16 72.6 kg (160 lb)              Today, you had the following     No orders found for display       Primary Care Provider Office Phone # Fax #    [facility] [phone]       [facility] [place] [address]        Equal Access to Services     JILL NIXON : Hadii aad ku hadasho Soomaali, waaxda luqadaha, qaybta kaalmada adeegyada, waxay idiin hayaan adeeg maliha blum . So [facility] [phone].    ATENCIÓN: Si habla español, tiene a roberts disposición servicios gratuitos de asistencia lingüística. Llame al [phone].    We comply with applicable federal civil rights laws and [place] laws. We do not discriminate on the basis of race, color, national origin, age, disability sex, sexual orientation or gender identity.            Thank you!     Thank you for choosing [facility] [place]  for your care. Our goal is always to provide you with excellent care. Hearing back from our patients is one way we can continue to improve our services. Please take a few minutes to complete the written survey that you may receive in the mail after your visit with us. Thank you!             Your Updated Medication List - Protect others around you: Learn how to safely use, store and throw away your medicines at www.disposemymeds.org.          This list is accurate as of: 8/22/17 12:40 PM.  Always use your most recent med list.                   Brand Name Dispense Instructions for use Diagnosis    citalopram 20 MG tablet    celeXA    180 tablet    Take 2 tablets (40 mg) by mouth daily    Anxiety state, unspecified, Tobacco use disorder, Mild major depression (H)       emtricitabine-tenofovir 200-300 MG per tablet    TRUVADA     Take 1 tablet by mouth daily        LAMOTRIGINE PO      Take 100 mg by mouth daily        LORazepam 0.5 MG tablet    ATIVAN    90 tablet    Take 1  tablet (0.5 mg) by mouth as needed 1-2 tablets up to 3 times per day.    Anxiety state, unspecified, Mild major depression (H)       M-VIT PO      1 tablet daily        naltrexone 50 MG tablet    DEPADE;REVIA    30 tablet    Take 1 tablet (50 mg) by mouth daily    Alcohol use disorder, severe, in early remission, in controlled environment, dependence (H)

## 2017-08-22 NOTE — PROGRESS NOTES
"Infusion Nursing Note:  Dariusz Ferreira presents today for naltrexone injection.    Patient seen by provider today: No   present during visit today: Not Applicable.    Note: Denies relapse..    Intravenous Access:  No Intravenous access/labs at this visit.  Given in right glute with 2\" needle.  First attempt unsuccessful (needle clogged).    Treatment Conditions:  Denies relapse.      Post Infusion Assessment:  Patient tolerated injection without incident.    Discharge Plan:   Patient discharged in stable condition accompanied by: self.  Departure Mode: Ambulatory.    Merlene Valdes RN                        "

## 2017-09-29 DIAGNOSIS — F10.21 ALCOHOL USE DISORDER, SEVERE, IN SUSTAINED REMISSION (H): Primary | ICD-10-CM

## 2017-11-14 ENCOUNTER — MYC MEDICAL ADVICE (OUTPATIENT)
Dept: ADDICTION MEDICINE | Facility: CLINIC | Age: 37
End: 2017-11-14

## 2017-11-14 NOTE — TELEPHONE ENCOUNTER
Valerie below sent to patient.     Dr. Lico Parker reviewed your message and states that you can go ahead and start taking the 50mg tablets.  There is no need to ramp up the dose so to speak. You should have a few refills on file at your pharmacy. Thank you!    Dede Paul  BSN-RN Care Coordinator  [facility]

## 2017-11-14 NOTE — TELEPHONE ENCOUNTER
"He can just start taking the 50mg tablets.  There is no need to \"ramp up\".      Analy Barfield MD   "

## 2019-05-19 ENCOUNTER — APPOINTMENT (OUTPATIENT)
Dept: GENERAL RADIOLOGY | Facility: CLINIC | Age: 39
End: 2019-05-19
Attending: EMERGENCY MEDICINE
Payer: COMMERCIAL

## 2019-05-19 ENCOUNTER — HOSPITAL ENCOUNTER (EMERGENCY)
Facility: CLINIC | Age: 39
Discharge: HOME | End: 2019-05-19
Attending: EMERGENCY MEDICINE | Admitting: EMERGENCY MEDICINE
Payer: COMMERCIAL

## 2019-05-19 VITALS
SYSTOLIC BLOOD PRESSURE: 131 MMHG | HEART RATE: 100 BPM | OXYGEN SATURATION: 100 % | TEMPERATURE: 98.5 F | WEIGHT: 160 LBS | HEIGHT: 68 IN | RESPIRATION RATE: 18 BRPM | DIASTOLIC BLOOD PRESSURE: 104 MMHG | BODY MASS INDEX: 24.25 KG/M2

## 2019-05-19 DIAGNOSIS — M25.512 ACUTE PAIN OF LEFT SHOULDER: ICD-10-CM
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
## 2019-05-19 DIAGNOSIS — S09.90XA CLOSED HEAD INJURY, INITIAL ENCOUNTER: ICD-10-CM

## 2019-05-19 PROCEDURE — 25000132 ZZH RX MED GY IP 250 OP 250 PS 637: Performed by: EMERGENCY MEDICINE

## 2019-05-19 PROCEDURE — 73030 X-RAY EXAM OF SHOULDER: CPT | Mod: LT

## 2019-05-19 PROCEDURE — 99283 EMERGENCY DEPT VISIT LOW MDM: CPT

## 2019-05-19 RX ORDER — IBUPROFEN 600 MG/1
600 TABLET, FILM COATED ORAL ONCE
Status: COMPLETED | OUTPATIENT
Start: 2019-05-19 | End: 2019-05-19

## 2019-05-19 RX ADMIN — IBUPROFEN 600 MG: 600 TABLET ORAL at 16:43

## 2019-05-19 ASSESSMENT — MIFFLIN-ST. JEOR: SCORE: 1625.26

## 2019-05-19 ASSESSMENT — ENCOUNTER SYMPTOMS
ABDOMINAL PAIN: 0
HEADACHES: 1
NECK PAIN: 1
ARTHRALGIAS: 1

## 2019-05-19 NOTE — ED AVS SNAPSHOT
Emergency Department  [address] [place] [address]  Phone:  [phone]  Fax:  [phone]                                    Dariusz Ferreira   MRN: [id number]    Department:   Emergency Department   Date of Visit:  5/19/2019           After Visit Summary Signature Page    I have received my discharge instructions, and my questions have been answered. I have discussed any challenges I see with this plan with the nurse or doctor.    ..........................................................................................................................................  Patient/Patient Representative Signature      ..........................................................................................................................................  Patient Representative Print Name and Relationship to Patient    ..................................................               ................................................  Date                                   Time    ..........................................................................................................................................  Reviewed by Signature/Title    ...................................................              ..............................................  Date                                               Time          22EPIC Rev 08/18
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
## 2019-05-21 ENCOUNTER — TELEPHONE (OUTPATIENT)
Dept: SURGERY | Facility: CLINIC | Age: 39
End: 2019-05-21

## 2019-09-30 ENCOUNTER — HEALTH MAINTENANCE LETTER (OUTPATIENT)
Age: 39
End: 2019-09-30

## 2019-10-12 ENCOUNTER — MYC MEDICAL ADVICE (OUTPATIENT)
Dept: ADDICTION MEDICINE | Facility: CLINIC | Age: 39
End: 2019-10-12

## 2019-10-14 NOTE — TELEPHONE ENCOUNTER
Last ov 4/18/2017.  Next ov Not scheduled.    Refill declined. Patient encouraged to establish care with a PCP.     No data on the  found.

## 2020-06-10 ENCOUNTER — HOSPITAL ENCOUNTER (EMERGENCY)
Facility: CLINIC | Age: 40
Discharge: HOME | End: 2020-06-11
Attending: EMERGENCY MEDICINE | Admitting: EMERGENCY MEDICINE
Payer: COMMERCIAL

## 2020-06-10 ENCOUNTER — APPOINTMENT (OUTPATIENT)
Dept: GENERAL RADIOLOGY | Facility: CLINIC | Age: 40
End: 2020-06-10
Attending: EMERGENCY MEDICINE
Payer: COMMERCIAL

## 2020-06-10 DIAGNOSIS — S61.210A LACERATION OF RIGHT INDEX FINGER WITHOUT FOREIGN BODY WITHOUT DAMAGE TO NAIL, INITIAL ENCOUNTER: ICD-10-CM

## 2020-06-10 DIAGNOSIS — F10.920 ALCOHOLIC INTOXICATION WITHOUT COMPLICATION (H): ICD-10-CM

## 2020-06-10 DIAGNOSIS — F10.10 ALCOHOL ABUSE: ICD-10-CM

## 2020-06-10 DIAGNOSIS — F41.0 ANXIETY ATTACK: ICD-10-CM

## 2020-06-10 LAB — INTERPRETATION ECG - MUSE: NORMAL

## 2020-06-10 PROCEDURE — 99285 EMERGENCY DEPT VISIT HI MDM: CPT | Mod: 25

## 2020-06-10 PROCEDURE — 93005 ELECTROCARDIOGRAM TRACING: CPT

## 2020-06-10 PROCEDURE — 90471 IMMUNIZATION ADMIN: CPT

## 2020-06-10 PROCEDURE — 82075 ASSAY OF BREATH ETHANOL: CPT

## 2020-06-10 PROCEDURE — 73140 X-RAY EXAM OF FINGER(S): CPT | Mod: RT

## 2020-06-10 ASSESSMENT — MIFFLIN-ST. JEOR: SCORE: 1688.29

## 2020-06-10 NOTE — ED AVS SNAPSHOT
Emergency Department  [address] [place] [address]  Phone:  [phone]  Fax:  [phone]                                    Dariusz Ferreira   MRN: [id number]    Department:   Emergency Department   Date of Visit:  6/10/2020           After Visit Summary Signature Page    I have received my discharge instructions, and my questions have been answered. I have discussed any challenges I see with this plan with the nurse or doctor.    ..........................................................................................................................................  Patient/Patient Representative Signature      ..........................................................................................................................................  Patient Representative Print Name and Relationship to Patient    ..................................................               ................................................  Date                                   Time    ..........................................................................................................................................  Reviewed by Signature/Title    ...................................................              ..............................................  Date                                               Time          22EPIC Rev 08/18

## 2020-06-11 VITALS
SYSTOLIC BLOOD PRESSURE: 182 MMHG | BODY MASS INDEX: 26.52 KG/M2 | OXYGEN SATURATION: 98 % | WEIGHT: 175 LBS | DIASTOLIC BLOOD PRESSURE: 148 MMHG | HEART RATE: 126 BPM | HEIGHT: 68 IN | TEMPERATURE: 98.3 F

## 2020-06-11 LAB — ALCOHOL BREATH TEST: 0.24 (ref 0–0.01)

## 2020-06-11 PROCEDURE — 25000132 ZZH RX MED GY IP 250 OP 250 PS 637: Performed by: EMERGENCY MEDICINE

## 2020-06-11 PROCEDURE — 25000128 H RX IP 250 OP 636: Performed by: EMERGENCY MEDICINE

## 2020-06-11 PROCEDURE — 90715 TDAP VACCINE 7 YRS/> IM: CPT | Performed by: EMERGENCY MEDICINE

## 2020-06-11 RX ORDER — CHLORDIAZEPOXIDE HYDROCHLORIDE 25 MG/1
25 CAPSULE, GELATIN COATED ORAL 4 TIMES DAILY PRN
Qty: 15 CAPSULE | Refills: 0 | Status: SHIPPED | OUTPATIENT
Start: 2020-06-11 | End: 2020-11-25

## 2020-06-11 RX ORDER — LORAZEPAM 1 MG/1
1 TABLET ORAL ONCE
Status: COMPLETED | OUTPATIENT
Start: 2020-06-11 | End: 2020-06-11

## 2020-06-11 RX ADMIN — LORAZEPAM 1 MG: 1 TABLET ORAL at 02:01

## 2020-06-11 RX ADMIN — CLOSTRIDIUM TETANI TOXOID ANTIGEN (FORMALDEHYDE INACTIVATED), CORYNEBACTERIUM DIPHTHERIAE TOXOID ANTIGEN (FORMALDEHYDE INACTIVATED), BORDETELLA PERTUSSIS TOXOID ANTIGEN (GLUTARALDEHYDE INACTIVATED), BORDETELLA PERTUSSIS FILAMENTOUS HEMAGGLUTININ ANTIGEN (FORMALDEHYDE INACTIVATED), BORDETELLA PERTUSSIS PERTACTIN ANTIGEN, AND BORDETELLA PERTUSSIS FIMBRIAE 2/3 ANTIGEN 0.5 ML: 5; 2; 2.5; 5; 3; 5 INJECTION, SUSPENSION INTRAMUSCULAR at 02:01

## 2020-06-11 ASSESSMENT — ENCOUNTER SYMPTOMS
ABDOMINAL PAIN: 0
SHORTNESS OF BREATH: 0
FEVER: 0
COUGH: 0

## 2020-06-11 NOTE — ED NOTES
Pt comes running to nursing station stating that he is having a heart attack.  Pt runs back to room.  This writer assess patient, patient is wriggling in bed stating that he is having a heart attack.  Pt very anxious with statement, hyperventilating, and then holding his breath and contorting his body for a brief second.  Pt seems unaware of surroundings during episode, stating to call his Doctor.  Stat EKG ordered and performed, pt not cooperative at time of EKG, moving around in bed.  EKG obtained.  MD present at bedside, pt instructed on deep breathing exercising.  Pt complies.  Pt starts to calm down, and becomes weepy.  Will continue to monitor.
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
## 2020-06-11 NOTE — ED PROVIDER NOTES
Sign Out Note    Pt accepted in sign out from: Dr. Sue    Briefly pt presented to the ED for: etoh    Plan at time of sign out: await sobriety and pts spouse, Zeyad, stated he would pick patient up    Care of patient during my shift: no issues. At 630 had face to face with patient.  He is now clinically sober.  He desires to be discharged to home.  His spouse, Zeyad, has agreed to pick him up.  The patient is not suicidal.  Patient provided resources by Dr. Campos.    Plan for patient at this time: Discharge to home.          Mango Bianchi,   06/11/20 0639

## 2020-06-11 NOTE — ED NOTES
Pt was repeatedly calling  telling him to come pick him up or he would kill him when he got home. , Zeyad called to see if we could take away the phone in which we did take the phone from the pt. Zeyad stated that pt is on day 5 of a hurtado and gets this way around this time of the year since it is the time of year that his mother passed away. Pt apparently tries to get in touch with his family who he is estranged from. Pt kyle with alcohol and meth use. Zeyad states that pt is a great person when not doing alcohol and meth and when the pt takes his medication. Plan is to sober and DEC

## 2020-06-11 NOTE — DISCHARGE INSTRUCTIONS
4 day Librium taper:  50 mg every 6 to 12 hours on day 1,   then 25 mg every 6 hours on day 2,   then 25 mg every 12 hours on day 3,  25 mg at night on day 4.    Discharge Instructions  Laceration (Cut)    Keep splint in place until suture removal; may remove twice daily to apply over-the-counter antibiotic ointment (i.e. Neosporin or bacitracin) over the next 2 days    You were seen today for a laceration (cut).  Your provider examined your laceration for any problems such a buried foreign body (like glass, a splinter, or gravel), or injury to blood vessels, tendons, and nerves.  Your provider may have also rinsed and/or scrubbed your laceration to help prevent an infection. It may not be possible to find all problems with your laceration on the first visit; occasionally foreign bodies or a tendon injury can go undetected.    Your laceration may have been closed in one of several ways:  No closure: many wounds will heal just fine without closure.  Stitches: regular stitches that require removal.  Staples: skin staples are often used in the scalp/head.  Wound adhesive (glue): skin glue can be used for certain lacerations and doesn t require removal.  Wound strips (aka Butterfly bandages or steri-strips): these are bandages that help to close a wound.  Absorbable stitches:  dissolving  stitches that go away on their own and usually don t require removal.    A small percentage of wounds will develop an infection regardless of how well the wound is cared for. Antibiotics are generally not indicated to prevent an infection so are only given for a small number of high-risk wounds. Some lacerations are too high risk to close, and are left open to heal because closure can increase the likelihood that an infection will develop.    Remember that all lacerations, no matter how expertly repaired, will cause scarring. We consider many factors, techniques, and materials, in our efforts to provide the best possible cosmetic  outcome.    Generally, every Emergency Department visit should have a follow-up clinic visit with either a primary or a specialty clinic/provider. Please follow-up as instructed by your emergency provider today.     Return to the Emergency Department right away if:  You have more redness, swelling, pain, drainage (pus), a bad smell, or red streaking from your laceration as these symptoms could indicate an infection.  You have a fever of 100.4 F or more.  You have bleeding that you cannot stop at home. If your cut starts to bleed, hold pressure on the bleeding area with a clean cloth or put pressure over the bandage.  If the bleeding does not stop after using constant pressure for 30 minutes, you should return to the Emergency Department for further treatment.  An area past the laceration is cool, pale, or blue compared with the other side, or has a slower return of color when squeezed.  Your dressing seems too tight or starts to get uncomfortable or painful. For children, signs of a problem might be irritability or restlessness.  You have loss of normal function or use of an area, such as being unable to straighten or bend a finger normally.  You have a numb area past the laceration.    Return to the Emergency Department or see your regular provider if:  The laceration starts to come open.   You have something coming out of the cut or a feeling that there is something in the laceration.  Your wound will not heal, or keeps breaking open. There can always be glass, wood, dirt or other things in any wound.  They will not always show up, even on x-rays.  If a wound does not heal, this may be why, and it is important to follow-up with your regular provider.    Home Care:  Take your dressing off in 12-24 hours, or as instructed by your provider, to check your laceration. Remove the dressing sooner if it seems too tight or painful, or if it is getting numb, tingly, or pale past the dressing.  Gently wash your laceration  1-2 times daily with clean water and mild soap. It is okay to shower or run clean water over the laceration, but do not let the laceration soak in water (no swimming).  If your laceration was closed with wound adhesive or strips: pat it dry and leave it open to the air. For all other repairs: after you wash your laceration, or at least 2 times a day, apply antibiotic ointment (such as Neosporin  or Bacitracin ) to the laceration, then cover it with a Band-Aid  or gauze.  Keep the laceration clean. Wear gloves or other protective clothing if you are around dirt.    Follow-up for removal:  If your wound was closed with staples or regular stitches, they need to be removed according to the instructions and timeline specified by your provider today.  If your wound was closed with absorbable ( dissolving ) sutures, they should fall out, dissolve, or not be visible in about one week. If they are still visible, then they should be removed according to the instructions and timeline specified by your provider today.    Scars:  To help minimize scarring:  Wear sunscreen over the healed laceration when out in the sun.  Massage the area regularly once healed.  You may apply Vitamin E to the healed wound.  Wait. Scars improve in appearance over months and years.    If you were given a prescription for medicine here today, be sure to read all of the information (including the package insert) that comes with your prescription.  This will include important information about the medicine, its side effects, and any warnings that you need to know about.  The pharmacist who fills the prescription can provide more information and answer questions you may have about the medicine.  If you have questions or concerns that the pharmacist cannot address, please call or return to the Emergency Department.       Remember that you can always come back to the Emergency Department if you are not able to see your regular provider in the amount of  time listed above, if you get any new symptoms, or if there is anything that worries you.

## 2020-06-11 NOTE — ED PROVIDER NOTES
"  History     Chief Complaint:  Laceration and Suicidal      The history is provided by the patient.      Dariusz Ferreira is a 39 year old male with a past history of anxiety, depression, alcohol use, and methamphetamine use who presents with alcohol intoxication and report of suicidal gestures/passive suicidal statements.  Patient was brought to the ED by his  due to concern for alcohol intoxication and possible drug use as well as a right finger laceration.  Per patient, he reports that he is not suicidal and sustained a cut to his hand when he punched out a window to obtain access to his house after he was locked out and reports that he has been drinking today but denies binge drinking.      Patient's  reports that he has been binge drinking since Sunday (3 days ago) and not been taking his medications for the last 5 days.   reports that patient has history of cocaine and methamphetamine abuse in the past and that hand laceration was sustained after the  locked the door to take patient a [facility] detox and patient became frustrated and punched the window.  Additionally has been reports that he has been making passive suicidal statements and earlier today grabbed a knife in a gesture toward himself stating that he wanted to \"end it all\".    Allergies:  Amoxicillin  Erythromycin  Penicillins     Medications:    Citalopram  Emtricitabine-Tenofovir  Lamotrigine  Lorazepam  Naltrexone  Fluticasone   Tadalafil  Trazodone  Fluvoxamine  Bupriopion    Past Medical History:    Anxiety  Depression  HTN  Asthma  Tobacco use disorder  Rib contusion  Alcohol use disorder  Methamphetamine abuse  Insomnia   Acne estivalis  Esophageal reflux     Past Surgical History:    Mucous cyst removal  Tonsillectomy    Family History:    Mother - HTN, Alcohol/Drug Abuse, Anemia, Depression, Asthma, Cerebrovascular Disease  Father - Alcohol Abuse  Sister(s) - Alcohol/Drug Abuse, Thyroid Disease    Social " "History:  Smoking status: Current Some Day Smoker  Smokeless tobacco: Never Used  Alcohol use: Yes  Drug use: No  PCP: [facility] [place]  Marital Status:      Review of Systems   Constitutional: Negative for fever.   Respiratory: Negative for cough and shortness of breath.    Cardiovascular: Negative for chest pain.   Gastrointestinal: Negative for abdominal pain.   Psychiatric/Behavioral:        Positive depression and alcohol abuse   All other systems reviewed and are negative.      Physical Exam     Patient Vitals for the past 24 hrs:   BP Temp Temp src Pulse SpO2 Height Weight   06/10/20 2128 (!) 153/91 98.3  F (36.8  C) Oral 91 98 % 1.727 m (5' 8\") 79.4 kg (175 lb)        Physical Exam  General: Alert and cooperative with exam. Patient in mild distress. Moderately intoxicated.  Head:  Scalp is NC/AT  Eyes:  No scleral icterus, PERRL  ENT:  The external nose and ears are normal.  Neck:  Normal range of motion without rigidity.  CV:  Regular rate and rhythm  Resp:  Breath sounds are clear bilaterally    Non-labored, no retractions or accessory muscle use  GI:  Abdomen is soft, no distension, no tenderness. No peritoneal signs  MS:  No lower extremity edema   Skin:  Warm and dry. 3.5 cm full thickness laceration to the anterior PIP right index finger without evidence of tendon injury or foreign body.   Neuro: Oriented x 3. No gross motor deficits.   Psych:  Patient denies SI/HI/Depression.  Emotionally labile      Emergency Department Course   ECG:  ECG taken at 2303, ECG read at 2310  Sinus tachycardia   Otherwise normal ECG  Rate 128 bpm. LA interval 148 ms. QRS duration 74 ms. QT/QTc 300/438 ms. P-R-T axes 51 18 66.    Imaging:  Radiology findings were communicated with the patient who voiced understanding of the findings.    XR Finger Right G/E 2 Views  Soft tissue laceration second digit. No visible fracture, dislocation or opaque foreign body.  Reading per radiology    Laboratory:  Laboratory " findings were communicated with the patient who voiced understanding of the findings.    Alcohol breath test: 0.239    Procedures    Laceration Repair        LACERATION:  A simple minimally Contaminated 3.5 cm laceration.      LOCATION:  Right index finger on the anterior PIP      FUNCTION:  Distally sensation, circulation and motor are intact.      ANESTHESIA:  Digital block using 0.5% bupivacaine total of 3 mLs      PREPARATION:  Irrigation and Scrubbing with Normal Saline      DEBRIDEMENT:  no debridement      CLOSURE:  Wound was closed with One Layer.  Skin closed with 6 x 5.0 Nylon using interrupted sutures.      Interventions:  1 mg Ativan  TDAP    Emergency Department Course:  Nursing notes and vitals reviewed.    10:07 PM I performed an exam of the patient as documented above.     The patient was sent for a XR finger right 2 views while in the emergency department, results above.    11:57 PM  I called the patient's spouse, Zeyad, regarding the patient.      The patient is signed out to Dr. Bianchi    Impression & Plan      Medical Decision Making:  Dariusz Ferreira is a 39 year old male who presents to the emergency department today for evaluation of alcohol intoxication and right finger laceration.  Patient's medical history and records were reviewed.  On evaluation patient was moderately intoxicated.  Right index finger laceration was repaired as noted above and demonstrates no evidence of foreign body or tendon injury on evaluation; x-ray negative.  Tetanus updated. Finger was splinted for protection.  Recommended suture removal in 1 week.  During ED course patient did have a typical anxiety attack and EKG was obtained demonstrating sinus tachycardia; this resolved without intervention.  Patient later confirmed that he has been binge drinking and is concerned about withdrawal symptoms; he was offered but deferred detox; no evidence of current withdrawl.  Denied suicidal or homicidal ideation.  Patient's  care was discussed with his spouse, Zeyad.      At this time plan is to allow patient to sober in the emergency department and discharge with Librium taper if his spouse is agreeable to monitor patient.  Recommended close follow-up with PCP.  Signed out to my partner Dr. Bianchi pending clinical sobriety and reassessment.    Diagnosis:    ICD-10-CM    1. Laceration of right index finger without foreign body without damage to nail, initial encounter  S61.210A    2. Alcoholic intoxication without complication (H)  F10.920    3. Alcohol abuse  F10.10    4. Anxiety attack  F41.0        Disposition:   The patient is signed out to Dr. Bianchi    Scribe Disclosure:  I, Precious Villarreal, am serving as a scribe at 10:07 PM on 6/10/2020 to document services personally performed by Duglas Campos DO  based on my observations and the provider's statements to me.      EMERGENCY DEPARTMENT       Duglas Campos DO  06/11/20 0153       Duglas Campos DO  06/11/20 0227

## 2020-08-12 ENCOUNTER — NURSE TRIAGE (OUTPATIENT)
Dept: NURSING | Facility: CLINIC | Age: 40
End: 2020-08-12

## 2020-08-12 ENCOUNTER — HOSPITAL ENCOUNTER (EMERGENCY)
Facility: CLINIC | Age: 40
Discharge: HOME | End: 2020-08-12
Attending: EMERGENCY MEDICINE | Admitting: EMERGENCY MEDICINE
Payer: COMMERCIAL

## 2020-08-12 VITALS
WEIGHT: 180 LBS | SYSTOLIC BLOOD PRESSURE: 145 MMHG | BODY MASS INDEX: 27.28 KG/M2 | HEART RATE: 91 BPM | HEIGHT: 68 IN | TEMPERATURE: 98.2 F | DIASTOLIC BLOOD PRESSURE: 81 MMHG | OXYGEN SATURATION: 100 % | RESPIRATION RATE: 16 BRPM

## 2020-08-12 DIAGNOSIS — F19.10 SUBSTANCE ABUSE (H): ICD-10-CM

## 2020-08-12 DIAGNOSIS — R44.3 HALLUCINATIONS: ICD-10-CM

## 2020-08-12 LAB
ANION GAP SERPL CALCULATED.3IONS-SCNC: 13 MMOL/L (ref 3–14)
BASOPHILS # BLD AUTO: 0 10E9/L (ref 0–0.2)
BASOPHILS NFR BLD AUTO: 0.4 %
BUN SERPL-MCNC: 10 MG/DL (ref 7–30)
CALCIUM SERPL-MCNC: 9.3 MG/DL (ref 8.5–10.1)
CHLORIDE SERPL-SCNC: 97 MMOL/L (ref 94–109)
CO2 SERPL-SCNC: 22 MMOL/L (ref 20–32)
CREAT SERPL-MCNC: 0.86 MG/DL (ref 0.66–1.25)
DIFFERENTIAL METHOD BLD: NORMAL
EOSINOPHIL # BLD AUTO: 0 10E9/L (ref 0–0.7)
EOSINOPHIL NFR BLD AUTO: 0.8 %
ERYTHROCYTE [DISTWIDTH] IN BLOOD BY AUTOMATED COUNT: 12.6 % (ref 10–15)
GFR SERPL CREATININE-BSD FRML MDRD: >90 ML/MIN/{1.73_M2}
GLUCOSE SERPL-MCNC: 95 MG/DL (ref 70–99)
HCT VFR BLD AUTO: 44.5 % (ref 40–53)
HGB BLD-MCNC: 15.7 G/DL (ref 13.3–17.7)
IMM GRANULOCYTES # BLD: 0 10E9/L (ref 0–0.4)
IMM GRANULOCYTES NFR BLD: 0 %
LYMPHOCYTES # BLD AUTO: 1.4 10E9/L (ref 0.8–5.3)
LYMPHOCYTES NFR BLD AUTO: 28.1 %
MCH RBC QN AUTO: 31 PG (ref 26.5–33)
MCHC RBC AUTO-ENTMCNC: 35.3 G/DL (ref 31.5–36.5)
MCV RBC AUTO: 88 FL (ref 78–100)
MONOCYTES # BLD AUTO: 0.7 10E9/L (ref 0–1.3)
MONOCYTES NFR BLD AUTO: 14.5 %
NEUTROPHILS # BLD AUTO: 2.9 10E9/L (ref 1.6–8.3)
NEUTROPHILS NFR BLD AUTO: 56.2 %
NRBC # BLD AUTO: 0 10*3/UL
NRBC BLD AUTO-RTO: 0 /100
PLATELET # BLD AUTO: 314 10E9/L (ref 150–450)
POTASSIUM SERPL-SCNC: 3.5 MMOL/L (ref 3.4–5.3)
RBC # BLD AUTO: 5.06 10E12/L (ref 4.4–5.9)
SODIUM SERPL-SCNC: 132 MMOL/L (ref 133–144)
TROPONIN I SERPL-MCNC: <0.015 UG/L (ref 0–0.04)
WBC # BLD AUTO: 5.1 10E9/L (ref 4–11)

## 2020-08-12 PROCEDURE — 25000132 ZZH RX MED GY IP 250 OP 250 PS 637: Performed by: EMERGENCY MEDICINE

## 2020-08-12 PROCEDURE — 84484 ASSAY OF TROPONIN QUANT: CPT | Performed by: EMERGENCY MEDICINE

## 2020-08-12 PROCEDURE — 80048 BASIC METABOLIC PNL TOTAL CA: CPT | Performed by: EMERGENCY MEDICINE

## 2020-08-12 PROCEDURE — 96372 THER/PROPH/DIAG INJ SC/IM: CPT

## 2020-08-12 PROCEDURE — 25000128 H RX IP 250 OP 636: Performed by: EMERGENCY MEDICINE

## 2020-08-12 PROCEDURE — 96360 HYDRATION IV INFUSION INIT: CPT

## 2020-08-12 PROCEDURE — 25800030 ZZH RX IP 258 OP 636: Performed by: EMERGENCY MEDICINE

## 2020-08-12 PROCEDURE — 85025 COMPLETE CBC W/AUTO DIFF WBC: CPT | Performed by: EMERGENCY MEDICINE

## 2020-08-12 PROCEDURE — 99285 EMERGENCY DEPT VISIT HI MDM: CPT | Mod: 25

## 2020-08-12 PROCEDURE — 93005 ELECTROCARDIOGRAM TRACING: CPT

## 2020-08-12 RX ORDER — HALOPERIDOL 5 MG/ML
INJECTION INTRAMUSCULAR
Status: DISCONTINUED
Start: 2020-08-12 | End: 2020-08-12 | Stop reason: HOSPADM

## 2020-08-12 RX ORDER — OLANZAPINE 10 MG/1
10 TABLET, ORALLY DISINTEGRATING ORAL ONCE
Status: COMPLETED | OUTPATIENT
Start: 2020-08-12 | End: 2020-08-12

## 2020-08-12 RX ORDER — HALOPERIDOL 5 MG/ML
10 INJECTION INTRAMUSCULAR ONCE
Status: COMPLETED | OUTPATIENT
Start: 2020-08-12 | End: 2020-08-12

## 2020-08-12 RX ADMIN — OLANZAPINE 10 MG: 10 TABLET, ORALLY DISINTEGRATING ORAL at 12:19

## 2020-08-12 RX ADMIN — HALOPERIDOL LACTATE 10 MG: 5 INJECTION, SOLUTION INTRAMUSCULAR at 13:14

## 2020-08-12 RX ADMIN — SODIUM CHLORIDE 1000 ML: 9 INJECTION, SOLUTION INTRAVENOUS at 10:31

## 2020-08-12 ASSESSMENT — ENCOUNTER SYMPTOMS
ABDOMINAL PAIN: 0
SHORTNESS OF BREATH: 0
SORE THROAT: 0
DIARRHEA: 0
CONSTIPATION: 1
FEVER: 0
VOMITING: 0
COUGH: 0
PALPITATIONS: 1
NAUSEA: 1

## 2020-08-12 ASSESSMENT — MIFFLIN-ST. JEOR: SCORE: 1705.97

## 2020-08-12 NOTE — ED TRIAGE NOTES
Pt reports that he is having palpitations and arm numbness since this morning. Pt reports drug and alcohol use with friends in last 72 hours

## 2020-08-12 NOTE — ED NOTES
Patient has been hallucinating when writer went into room to discharge patient. Patient seeing some matt in TV monitor while TV is turned off. Stated that there is a matt going through chemo therapy. Patient drove himself to ED and was going to drive himself back home. MD notified of patient's condition. Will monitor.

## 2020-08-12 NOTE — ED NOTES
Patient became agitated and combative, code 21 called and patient placed on 5 points restraint. Haldol IM given. Patient transferred to room 17.

## 2020-08-12 NOTE — ED NOTES
"Report from Kely WEST RN.  RN was in process of discharging patient and pt became physically aggressive kicking his legs around and stated there was \"A person in the TV hooked up to a camera trying to get at us\" Pt continued to become increasingly agitated and repetitive in hallucinating about \"people in the TV and the radioactive waves they are hooked up to to get us.\" Pt placed in 5 point restraints for patient and staff safety. Dr. Irving outside pt room witnessing the events    "

## 2020-08-12 NOTE — TELEPHONE ENCOUNTER
39 y/o male calls about having been on a binger and meth for 3 days, he is experiencing symptoms of withdrawals, he reports vfeeling very shaky - isible hand tremors. Rn advised him per [facility] protocols that he needs to be evaluated in the ER. samantha Heath RN - [facility] Nurse Advisor  04/12/2020   3:25AM    Reason for Disposition    Feeling very shaky (i.e., visible tremors of hands)    Additional Information    Negative: Coma (e.g., not moving, not talking, not responding to stimuli)    Negative: Difficult to awaken or acting confused (e.g., disoriented, slurred speech)    Negative: Seeing, hearing, or feeling things that are not there (i.e., visual, auditory, or tactile hallucinations)    Negative: Slow, shallow and weak breathing    Negative: Seizure    Negative: Violent behavior, or threatening to physically hurt or kill someone    Negative: Sounds like a life-threatening emergency to the triager    Negative: Very strange, paranoid or confused behavior    Negative: [1] Fever > 100.0 F (37.8 C) AND [2] IVDA (intravenous drug abuse)    Protocols used: SUBSTANCE ABUSE AND [id number]

## 2020-08-12 NOTE — DISCHARGE INSTRUCTIONS
Stay away from friends who you have been doing drugs with, get back into your doctor and get some help or treatment.

## 2020-08-12 NOTE — ED AVS SNAPSHOT
Emergency Department  [address] [place] [id number]  Phone:  [phone]  Fax:  [phone]                                    Dariusz Ferreira   MRN: [id number]    Department:   Emergency Department   Date of Visit:  8/12/2020           After Visit Summary Signature Page    I have received my discharge instructions, and my questions have been answered. I have discussed any challenges I see with this plan with the nurse or doctor.    ..........................................................................................................................................  Patient/Patient Representative Signature      ..........................................................................................................................................  Patient Representative Print Name and Relationship to Patient    ..................................................               ................................................  Date                                   Time    ..........................................................................................................................................  Reviewed by Signature/Title    ...................................................              ..............................................  Date                                               Time          22EPIC Rev 08/18

## 2020-08-12 NOTE — ED NOTES
Pt given 2 warm blankets.  Pt calm and cooperative. Lights dimmed.  Pt not experiencing any hallucinations at this time

## 2020-08-12 NOTE — ED NOTES
"Patient agreed to take oral Zyprexa but still wanting to leave himself. Wants to sign out AMA. States:\"this is ridiculous, they are still laughing at me\" while pointing at the TV screen.Will notify MD.   "

## 2020-08-12 NOTE — ED PROVIDER NOTES
History   Chief Complaint:  Substance Abuse    HPI   Dariusz Ferreira is a 40 year old male, with a history of alcohol abuse, alcohol withdrawal, anxiety, hypertension, and depression, who presents to the ED for evaluation of substance abuse. The patient reports he had gone on a hurtado with his buddies for the past few days as he has not seen them recently. He states he used IV meth and was drinking alcohol. The patient comments he does not use meth regularly, or ever, and this was a first for him. He does not know how much meth he was injecting, however, he consumed about a liter of vodka a day over the past few days. He notes he last used meth yesterday. He denies any heroine, cocaine, or marijuana. Due to feeling anxious and some palpitations, he called the nurse line and instructed him to come into the emergency department to be evaluated. The patient denies any pain. He also denies any fever, cough, sore throat, shortness of breath, or chest pain. He does endorse some slight constipation and nausea, but no diarrhea or abdominal pain. He has been consuming food within normal limits, but has been binging.    Allergies:  Penicillins  Erythromycin    Medications:    Lamotrigine  Ativan  Naltrexone    Past Medical History:    Anxiety  Depressive disorder  Hypertension  Asthma  Alcohol abuse  Alcohol withdrawal    Past Surgical History:    Tonsillectomy  Tongue cystectomy    Family History:    Hypertension  Alcohol abuse  Drug abuse  Anemia  Depression  Asthma  Cerebrovascular disease  Graves disease    Social History:  Smoking status: Yes  Alcohol use: Yes  Drug use: Yes- Methamphetamines  [facility]P: [facility]  Marital Status:   [2]    Review of Systems   Constitutional: Negative for fever.   HENT: Negative for sore throat.    Respiratory: Negative for cough and shortness of breath.    Cardiovascular: Positive for palpitations. Negative for chest pain.   Gastrointestinal: Positive for constipation and  "nausea. Negative for abdominal pain, diarrhea and vomiting.   All other systems reviewed and are negative.    Physical Exam     Patient Vitals for the past 24 hrs:   BP Temp Temp src Pulse Resp SpO2 Height Weight   08/12/20 1111 (!) 145/81 -- -- 91 -- 100 % -- --   08/12/20 1016 (!) 151/106 98.2  F (36.8  C) Oral 101 16 98 % 1.727 m (5' 8\") 81.6 kg (180 lb)       Physical Exam  Constitutional:  Patient is oriented to person, place, and time. They appear well-developed and well-nourished. Mild distress secondary to substance abuse.    HENT:   Mouth/Throat:   Oropharynx is clear and moist.   Eyes:    Conjunctivae normal and EOM are normal. Pupils are equal, round, and reactive to light.   Neck:    Normal range of motion.   Cardiovascular: Mildly tachycardic. Regular rhythm and normal heart sounds.  Exam reveals no gallop and no friction rub.  No murmur heard.  Pulmonary/Chest:  Effort normal and breath sounds normal. Patient has no wheezes. Patient has no rales.   Abdominal:   Soft. Bowel sounds are normal. Patient exhibits no mass. There is no tenderness. There is no rebound and no guarding.   Musculoskeletal:  Normal range of motion. Patient exhibits no edema.   Neurological:   Patient is alert and oriented to person, place, and time. Patient has normal strength. No cranial nerve deficit or sensory deficit. GCS 15. Patient is shaky.   Skin:   Skin is warm and dry. No rash noted. No erythema. No evidence of infection or injection sites.   Psychiatric:   Patient has a normal mood and affect. Patient's behavior is normal. Judgment and thought content normal.    Emergency Department Course   ECG (10:42:35):  Rate 89 bpm. NJ interval 152. QRS duration 84. QT/QTc 372/452. P-R-T axes 43 17 50. Normal Sinus rhythm. Possible left atrial enlargement. Borderline ECG. Interpreted at 1043 by Vanessa Irving MD.    Laboratory:  Laboratory findings were communicated with the patient who voiced understanding of the " "findings.    CBC: WNL (WBC 5.1, HGB 15.7, )    BMP: Na 132 (L), o/w WNL (Creatinine 0.86)    Troponin: <0.015     Interventions:  1031: NS 1L IV Bolus     Emergency Department Course:  Past medical records, nursing notes, and vitals reviewed.    1025 I performed an exam of the patient as documented above.     EKG obtained in the ED, see results above.   IV was inserted and blood was drawn for laboratory testing, results above.    1135 I rechecked the patient and discussed the results of his workup thus far.    Just after discharge the patient the patient started having visual hallucinations and became more agitated.    1310 code 21 was called on this patient.  We were attempting to move him to behavioral health.  He insisted that people were in the TV waving at him.  He states \"I am not crazy\" he still does not recognize that this is a TV and this is his reflection.  He required physical and chemical restraints.    1420 patient is now resting comfortably in bed.  Restraints will be removed.    Findings and plan explained to the Patient. Patient discharged home with instructions regarding supportive care, medications, and reasons to return. The importance of close follow-up was reviewed.    I personally reviewed the laboratory results with the Patient and answered all related questions prior to discharge.     Impression & Plan   Medical Decision Making:  Recommended by us is a 40-year-old gentleman presenting today feeling generally shaky after having an alcohol and meth hurtado.  Signs on initial evaluation were mildly tachycardic and hypertensive.  These did both come down with IV fluids.  An EKG shows no evidence of ischemia or arrhythmia.  He had no focal neurologic deficits on exam but was certainly twitchy.  Basic blood work shows no evidence of metabolic disturbance or dehydration.    He received a liter of IV saline and food and feels much better.  There was no intent of self-harm with this hurtado he " was just getting together with bodies.    Addendum: Just after the patient was discharged she began having visual hallucinations and becoming more agitated.  I therefore removed him from the discharge list.  I gave him oral Zyprexa however he continued to get amped up.  We then moved him to the behavioral health unit.  The patient insists that there are people in the TV.  He does not recognize that these are reflections.  He is looking behind the TV looking for people.    He continues to insist that people are in the TV.  The patient was moved over to the behavioral health unit.  He remained in restraints for 1 hour.  At this time he is much more calm but sleeping.  I will sign him out to my partner pending a more stable and less agitated but awake and appropriate status.    Diagnosis:    ICD-10-CM    1. Substance abuse (H)  F19.10    2. Hallucinations  R44.3        Disposition:  Discharged to home.    Scribe Disclosure:  I, Cliffmadisyn Hutchinson, am serving as a scribe at 10:25 AM on 8/12/2020 to document services personally performed by Vanessa Irving MD based on my observations and the provider's statements to me.          Vanessa Irving MD  08/12/20 5125       Vanessa Irving MD  08/12/20 0117

## 2020-08-12 NOTE — ED NOTES
Pt appears calm.  Writeanmol and Amilcar, Security at bedside to remove restraint.  Writer clarified with pt when restraints are removed if pt becomes violent or is a threat to himself or others, pt will go back in restraints.  Pt verbalized understanding.  Restraints removed at 1430

## 2020-08-13 LAB — INTERPRETATION ECG - MUSE: NORMAL

## 2020-08-13 NOTE — ED PROVIDER NOTES
Patient signed out to me because of hallucinations and delusions from drugs.  I went back into reevaluate the patient now he is awake alert no longer delusional.  He recognizes that he was having a lot of issues earlier because of the drugs.  He wants to get into some help and will follow-up with his doctor.  He is safe to be discharged home at this time.  He denies any suicidal thoughts and is mad because he did drugs and wants to stay away from them.    (F19.10) Substance abuse (H)    (R44.3) Hallucinations             Vy Gu MD  08/12/20 2005

## 2020-11-24 ENCOUNTER — HOSPITAL ENCOUNTER (EMERGENCY)
Facility: CLINIC | Age: 40
Discharge: HOME | End: 2020-11-24
Payer: COMMERCIAL

## 2020-11-25 ENCOUNTER — HOSPITAL ENCOUNTER (EMERGENCY)
Facility: CLINIC | Age: 40
Discharge: HOME | End: 2020-11-25
Attending: EMERGENCY MEDICINE | Admitting: EMERGENCY MEDICINE
Payer: COMMERCIAL

## 2020-11-25 VITALS
SYSTOLIC BLOOD PRESSURE: 146 MMHG | WEIGHT: 160 LBS | TEMPERATURE: 99.3 F | RESPIRATION RATE: 20 BRPM | DIASTOLIC BLOOD PRESSURE: 106 MMHG | OXYGEN SATURATION: 95 % | BODY MASS INDEX: 24.25 KG/M2 | HEIGHT: 68 IN | HEART RATE: 119 BPM

## 2020-11-25 DIAGNOSIS — Z53.21 PATIENT LEFT WITHOUT BEING SEEN: ICD-10-CM

## 2020-11-25 PROCEDURE — 999N000104 HC STATISTIC NO CHARGE

## 2020-11-25 PROCEDURE — 258N000003 HC RX IP 258 OP 636: Performed by: EMERGENCY MEDICINE

## 2020-11-25 RX ORDER — ONDANSETRON 2 MG/ML
4 INJECTION INTRAMUSCULAR; INTRAVENOUS EVERY 30 MIN PRN
Status: DISCONTINUED | OUTPATIENT
Start: 2020-11-25 | End: 2020-11-25

## 2020-11-25 RX ADMIN — SODIUM CHLORIDE 1000 ML: 9 INJECTION, SOLUTION INTRAVENOUS at 12:22

## 2020-11-25 ASSESSMENT — MIFFLIN-ST. JEOR: SCORE: 1615.26

## 2020-11-25 NOTE — ED TRIAGE NOTES
"Drinking \"a lot\" of alcohol with friends last night - last drink ~0300, reports vomiting blood last night. Continued vomiting today so came in. Denies habitual ETOH use. Very anxious.  "

## 2020-11-25 NOTE — ED PROVIDER NOTES
I heard from nursing staff right before going into his room that he changed his mind and did not want to be seen. Did not want any blood work, fluids, medications or anything done by nursing.    I introduced myself to him and he confirmed that he did not want to be evaluated.      Arcelia Hidalgo MD  11/25/20 1233       Arcelia Hidalgo MD  11/25/20 1237

## 2020-11-25 NOTE — ED NOTES
After IV started, patient states he does not want blood drawn, processed in the lab, or any medications given. Writer explained rationale for testing and interventions. Patient continues to decline. MD alerted. Patient signed Declination of Medical Screening form. IV removed and patient ordered Uber to take home.

## 2020-12-02 ENCOUNTER — HOSPITAL ENCOUNTER (EMERGENCY)
Facility: CLINIC | Age: 40
Discharge: HOME | End: 2020-12-02
Attending: EMERGENCY MEDICINE | Admitting: EMERGENCY MEDICINE
Payer: COMMERCIAL

## 2020-12-02 VITALS
HEART RATE: 107 BPM | SYSTOLIC BLOOD PRESSURE: 152 MMHG | DIASTOLIC BLOOD PRESSURE: 96 MMHG | RESPIRATION RATE: 18 BRPM | OXYGEN SATURATION: 98 % | WEIGHT: 165 LBS | BODY MASS INDEX: 25.09 KG/M2 | TEMPERATURE: 98.3 F

## 2020-12-02 DIAGNOSIS — R00.0 SINUS TACHYCARDIA: ICD-10-CM

## 2020-12-02 DIAGNOSIS — F10.930 ALCOHOL WITHDRAWAL SYNDROME WITHOUT COMPLICATION (H): ICD-10-CM

## 2020-12-02 LAB
ALBUMIN SERPL-MCNC: 3.5 G/DL (ref 3.4–5)
ALP SERPL-CCNC: 84 U/L (ref 40–150)
ALT SERPL W P-5'-P-CCNC: 118 U/L (ref 0–70)
ANION GAP SERPL CALCULATED.3IONS-SCNC: 17 MMOL/L (ref 3–14)
AST SERPL W P-5'-P-CCNC: 75 U/L (ref 0–45)
BASOPHILS # BLD AUTO: 0 10E9/L (ref 0–0.2)
BASOPHILS NFR BLD AUTO: 0.6 %
BILIRUB DIRECT SERPL-MCNC: 0.2 MG/DL (ref 0–0.2)
BILIRUB SERPL-MCNC: 0.6 MG/DL (ref 0.2–1.3)
BUN SERPL-MCNC: 8 MG/DL (ref 7–30)
CALCIUM SERPL-MCNC: 8.8 MG/DL (ref 8.5–10.1)
CHLORIDE SERPL-SCNC: 100 MMOL/L (ref 94–109)
CO2 SERPL-SCNC: 20 MMOL/L (ref 20–32)
CREAT SERPL-MCNC: 0.7 MG/DL (ref 0.66–1.25)
DIFFERENTIAL METHOD BLD: ABNORMAL
EOSINOPHIL # BLD AUTO: 0 10E9/L (ref 0–0.7)
EOSINOPHIL NFR BLD AUTO: 0.6 %
ERYTHROCYTE [DISTWIDTH] IN BLOOD BY AUTOMATED COUNT: 13.7 % (ref 10–15)
ETHANOL SERPL-MCNC: 0.02 G/DL
GFR SERPL CREATININE-BSD FRML MDRD: >90 ML/MIN/{1.73_M2}
GLUCOSE SERPL-MCNC: 117 MG/DL (ref 70–99)
HCT VFR BLD AUTO: 41.6 % (ref 40–53)
HGB BLD-MCNC: 14.1 G/DL (ref 13.3–17.7)
IMM GRANULOCYTES # BLD: 0 10E9/L (ref 0–0.4)
IMM GRANULOCYTES NFR BLD: 0.9 %
LYMPHOCYTES # BLD AUTO: 0.7 10E9/L (ref 0.8–5.3)
LYMPHOCYTES NFR BLD AUTO: 20.7 %
MCH RBC QN AUTO: 30.7 PG (ref 26.5–33)
MCHC RBC AUTO-ENTMCNC: 33.9 G/DL (ref 31.5–36.5)
MCV RBC AUTO: 91 FL (ref 78–100)
MONOCYTES # BLD AUTO: 0.3 10E9/L (ref 0–1.3)
MONOCYTES NFR BLD AUTO: 7.9 %
NEUTROPHILS # BLD AUTO: 2.3 10E9/L (ref 1.6–8.3)
NEUTROPHILS NFR BLD AUTO: 69.3 %
NRBC # BLD AUTO: 0 10*3/UL
NRBC BLD AUTO-RTO: 0 /100
PLATELET # BLD AUTO: 144 10E9/L (ref 150–450)
POTASSIUM SERPL-SCNC: 3.2 MMOL/L (ref 3.4–5.3)
PROT SERPL-MCNC: 7 G/DL (ref 6.8–8.8)
RBC # BLD AUTO: 4.59 10E12/L (ref 4.4–5.9)
SODIUM SERPL-SCNC: 137 MMOL/L (ref 133–144)
WBC # BLD AUTO: 3.3 10E9/L (ref 4–11)

## 2020-12-02 PROCEDURE — 84460 ALANINE AMINO (ALT) (SGPT): CPT | Performed by: EMERGENCY MEDICINE

## 2020-12-02 PROCEDURE — 80076 HEPATIC FUNCTION PANEL: CPT | Performed by: EMERGENCY MEDICINE

## 2020-12-02 PROCEDURE — 80320 DRUG SCREEN QUANTALCOHOLS: CPT | Performed by: EMERGENCY MEDICINE

## 2020-12-02 PROCEDURE — 96361 HYDRATE IV INFUSION ADD-ON: CPT

## 2020-12-02 PROCEDURE — 85025 COMPLETE CBC W/AUTO DIFF WBC: CPT | Performed by: EMERGENCY MEDICINE

## 2020-12-02 PROCEDURE — 80048 BASIC METABOLIC PNL TOTAL CA: CPT | Performed by: EMERGENCY MEDICINE

## 2020-12-02 PROCEDURE — 250N000011 HC RX IP 250 OP 636: Performed by: EMERGENCY MEDICINE

## 2020-12-02 PROCEDURE — 99285 EMERGENCY DEPT VISIT HI MDM: CPT | Mod: 25

## 2020-12-02 PROCEDURE — 96376 TX/PRO/DX INJ SAME DRUG ADON: CPT

## 2020-12-02 PROCEDURE — 258N000003 HC RX IP 258 OP 636: Performed by: EMERGENCY MEDICINE

## 2020-12-02 PROCEDURE — 96374 THER/PROPH/DIAG INJ IV PUSH: CPT

## 2020-12-02 PROCEDURE — 96375 TX/PRO/DX INJ NEW DRUG ADDON: CPT

## 2020-12-02 RX ORDER — ONDANSETRON 2 MG/ML
4 INJECTION INTRAMUSCULAR; INTRAVENOUS ONCE
Status: COMPLETED | OUTPATIENT
Start: 2020-12-02 | End: 2020-12-02

## 2020-12-02 RX ORDER — FLUVOXAMINE MALEATE 100 MG
200 TABLET ORAL AT BEDTIME
COMMUNITY

## 2020-12-02 RX ORDER — LAMOTRIGINE 100 MG/1
300 TABLET ORAL DAILY
Status: ON HOLD | COMMUNITY
End: 2023-07-14

## 2020-12-02 RX ORDER — BUPROPION HYDROCHLORIDE 300 MG/1
300 TABLET ORAL EVERY MORNING
Status: ON HOLD | COMMUNITY
End: 2023-07-14

## 2020-12-02 RX ORDER — FLUTICASONE PROPIONATE 50 MCG
1 SPRAY, SUSPENSION (ML) NASAL DAILY PRN
COMMUNITY
End: 2022-08-03

## 2020-12-02 RX ORDER — DIAZEPAM 10 MG/2ML
10 INJECTION, SOLUTION INTRAMUSCULAR; INTRAVENOUS ONCE
Status: COMPLETED | OUTPATIENT
Start: 2020-12-02 | End: 2020-12-02

## 2020-12-02 RX ORDER — TADALAFIL 20 MG/1
20 TABLET ORAL DAILY PRN
COMMUNITY

## 2020-12-02 RX ORDER — HYDROXYZINE HYDROCHLORIDE 50 MG/1
25-50 TABLET, FILM COATED ORAL
COMMUNITY

## 2020-12-02 RX ADMIN — DIAZEPAM 10 MG: 5 INJECTION, SOLUTION INTRAMUSCULAR; INTRAVENOUS at 22:10

## 2020-12-02 RX ADMIN — DIAZEPAM 10 MG: 5 INJECTION, SOLUTION INTRAMUSCULAR; INTRAVENOUS at 22:53

## 2020-12-02 RX ADMIN — ONDANSETRON 4 MG: 2 INJECTION INTRAMUSCULAR; INTRAVENOUS at 22:10

## 2020-12-02 RX ADMIN — SODIUM CHLORIDE 1000 ML: 9 INJECTION, SOLUTION INTRAVENOUS at 22:10

## 2020-12-02 ASSESSMENT — ENCOUNTER SYMPTOMS
SHORTNESS OF BREATH: 1
NECK PAIN: 1
HALLUCINATIONS: 1
VOMITING: 1
TREMORS: 1
ABDOMINAL PAIN: 1

## 2020-12-02 NOTE — ED AVS SNAPSHOT
[facility] Emergency Dept  [address] [address]  Phone: [phone]  Fax: [phone]                                    Dariusz Ferreira   MRN: [id number]    Department: [facility]   Date of Visit: 12/2/2020           After Visit Summary Signature Page    I have received my discharge instructions, and my questions have been answered. I have discussed any challenges I see with this plan with the nurse or doctor.    ..........................................................................................................................................  Patient/Patient Representative Signature      ..........................................................................................................................................  Patient Representative Print Name and Relationship to Patient    ..................................................               ................................................  Date                                   Time    ..........................................................................................................................................  Reviewed by Signature/Title    ...................................................              ..............................................  Date                                               Time          22EPIC Rev 08/18

## 2020-12-03 NOTE — PHARMACY-ADMISSION MEDICATION HISTORY
Pharmacy Medication History  Admission medication history interview status for the 12/2/2020  admission is complete. See EPIC admission navigator for prior to admission medications       Medication history sources: Patient and Care Everywhere  Location of interview: Patient room  Adherence Assessment: Unable to determine    Significant changes made to the medication list:  -Added all medications    Additional medication history information:   -He reports lamotrigine was increased from 200mg daily to 300mg daily.    Medication reconciliation completed by provider prior to medication history? No    Time spent in this activity: 10 min      Prior to Admission medications    Medication Sig Last Dose Taking? Auth Provider   buPROPion (WELLBUTRIN XL) 300 MG 24 hr tablet Take 300 mg by mouth every morning Past Week at am Yes Unknown, Entered By History   fluticasone (FLONASE) 50 MCG/ACT nasal spray Spray 1 spray into both nostrils daily as needed for rhinitis or allergies prn Yes Unknown, Entered By History   fluvoxaMINE (LUVOX) 100 MG tablet Take 200 mg by mouth At Bedtime Past Week at pm Yes Unknown, Entered By History   hydrOXYzine (ATARAX) 50 MG tablet Take 25-50 mg by mouth nightly as needed for other (sleep) Past Week at pm Yes Unknown, Entered By History   lamoTRIgine (LAMICTAL) 100 MG tablet Take 300 mg by mouth daily 11/29 or 11/30 Yes Unknown, Entered By History   tadalafil (CIALIS) 20 MG tablet Take 20 mg by mouth daily as needed Not recently Yes Unknown, Entered By History       The information provided in this note is only as accurate as the sources available at the time of the update(s).

## 2020-12-03 NOTE — ED NOTES
"[facility]  ED Nurse Handoff Report    ED Chief complaint: Alcohol Intoxication      ED Diagnosis:   Final diagnoses:   None       Code Status: Full Code    Allergies:   Allergies   Allergen Reactions     Penicillins      Erythromycin Rash       Patient Story: Pt arrives to ED for evaluation of ETOH withdrawal. Pt states he has been \"on a hurtado\" for the past 10 days. Last drink 0300 12/2. Denies any other drug use  Focused Assessment:  Tremulous. Unable to ambulate. Tachycardic    Treatments and/or interventions provided: IVF, Zofran, Valium  Patient's response to treatments and/or interventions: Decrease in anxiety and nausea    To be done/followed up on inpatient unit:  See inpatient orders    Does this patient have any cognitive concerns?: A&O x4    Activity level - Baseline/Home:  Independent  Activity Level - Current:   Stand with assist x2    Patient's Preferred language: English   Needed?: No    Isolation: None  Infection: Not Applicable  Patient tested for COVID 19 prior to admission: YES  Bariatric?: No    Vital Signs:   Vitals:    12/02/20 2117   BP: (!) 142/99   Pulse: 107   Resp: 18   Temp: 98.3  F (36.8  C)   TempSrc: Oral   SpO2: 100%   Weight: 74.8 kg (165 lb)       Cardiac Rhythm:     Was the PSS-3 completed:   Yes  What interventions are required if any?               Family Comments: No family present  OBS brochure/video discussed/provided to patient/family: N/A       For the majority of the shift this patient's behavior was Green.   Behavioral interventions performed were None.    ED NURSE PHONE NUMBER: [phone]       "

## 2020-12-03 NOTE — ED PROVIDER NOTES
History     Chief Complaint:  Alcohol Withdrawals; Tremors     The history is provided by the patient.      Dariusz Ferreira is a 40 year old male with a history of alcohol withdrawals and hypertension who presents for evaluation of tremors concerning for alcohol withdrawl. The patient states that he has been binge drinking over the past ten days, drinking around half a 1.75L of liquor every 24 hours. The patient states that his last drink was yesterday and states that he was drunk until 10 hours ago. He states that he has since developed vomiting, diffuse abdominal pain and body tremors. He also notes of a worsening posterior neck pain within the past few hours. The patient also notes of shortness of breath and rapid breathing. The patient notes no fevers or cough. He notes no past abdominal surgeries. He notes no recent drug use. He notes that he had some hallucinations while drinking but notes no hallucinations now. He also notes that he has not taken his prescription medications for the past two days. He notes that he was in a treatment program a few years ago and is interested in this again.     Allergies:  Penicillins  Erythromycin     Medications:    Ativan    Past Medical History:    Anxiety   Depressive disorder   Hypertension   Alcohol withdrawls  Mild intermittent asthma     Past Surgical History:    Tonsillectomy    Family History:    Hypertension  Depression  Cerebrovascular disease     Social History:  The patient presents to the ED alone.  Smoking Status: Current Some Day Smoker, 4 pack years  Smokeless Tobacco: Never Used  Alcohol Use: Yes  Drug Use: Not currently, used to use cannabis and meth  [facility]     Review of Systems   Respiratory: Positive for shortness of breath.    Gastrointestinal: Positive for abdominal pain (diffuse) and vomiting.   Musculoskeletal: Positive for neck pain (posterior).   Neurological: Positive for tremors.   Psychiatric/Behavioral: Positive for  hallucinations (earlier while drinking, now resolved).   All other systems reviewed and are negative.    Physical Exam     Patient Vitals for the past 24 hrs:   BP Temp Temp src Pulse Resp SpO2 Weight   12/02/20 2321 (!) 152/96 -- -- 107 -- 98 % --   12/02/20 2301 (!) 151/83 -- -- 97 -- 98 % --   12/02/20 2117 (!) 142/99 98.3  F (36.8  C) Oral 107 18 100 % 74.8 kg (165 lb)     Physical Exam  General: Alert, appears well-developed and well-nourished. Cooperative.     In mild distress, tremulous.   HEENT:  Head:  Atraumatic  Ears:  External ears are normal  Mouth/Throat:  Oropharynx is without erythema or exudate and mucous membranes are dry.   Eyes:   Conjunctivae normal and EOM are normal. No scleral icterus.  Neck:   Normal range of motion. Neck supple.  CV:  Tachycardic rate, regular rhythm, normal heart sounds and radial pulses are 2+ and symmetric.  No murmur.  Resp:  Breath sounds are clear bilaterally    Non-labored, no retractions or accessory muscle use  GI:  Abdomen is soft, no distension, no tenderness. No rebound or guarding.  No CVA tenderness bilaterally.  MS:  Normal range of motion. No edema.    Normal strength in all 4 extremities.     Back atraumatic.    No midline cervical, thoracic, or lumbar tenderness  Skin:  Warm and dry.  No rash or lesions noted.  Neuro: Alert. Tremulous with tongue fasciculations. Normal strength.  GCS: 15  Psych:  Normal mood and affect.    Emergency Department Course     Laboratory:  Laboratory findings were communicated with the patient who voiced understanding of the findings.    CBC: WBC: 3.3 (low), HGB: 14.1, PLT: 144 (low)    BMP: Glucose 117 (high), Potassium 3.2 (low), Anion Gap 17 (high) o/w WNL (Creatinine: 0.70)    ALT: Pending    AST: Pending    Alcohol Ethyl: 0.02 (high)     Hepatic Panel:  (high), AST 75 (high), o/w WNL     Interventions:  2210 NS 1L IV  2210 Valium 10mg IV  2210 Zofran 4mg IV  2253 Valium 10mg IV    Emergency Department Course:  Past  medical records, nursing notes, and vitals reviewed.    2200 I performed an exam of the patient as documented above.     IV was inserted and blood was drawn for laboratory testing, results above.    2250 I rechecked the patient and discussed the results of his workup thus far. The patient requests to go home at this time and I feel that this is appropriate and that he is safe for discharge.     Findings and plan explained to the patient. Patient discharged home with instructions regarding supportive care, medications, and reasons to return. The importance of close follow-up was reviewed.     I personally reviewed the laboratory results with the patient and answered all related questions prior to discharge.     Impression & Plan     Medical Decision Making:  Dariusz Ferreira is a 40 year old male w a history of alcohol abuse and alcohol withdrawal who presents for evaluation of alcohol abuse over the past several days and today experiencing tremors. He is not intoxicated here in ED by blood work.  Blood work shows elevated ALT and AST at 118 and 75 respectively, but is otherwise unremarkable. He appears on exam to be going through acute alcohol withdrawal. He has no signs of trauma related to alcohol use and no further workup is needed including head CT. We discussed this and the patient was treated with Valium here with improvement. The patient states that he does not want inpatient detox at this time and will search for treatment programs on his own. He was offered hospitalization for further withdrawal treatment which he declined.  He does not have a hx of alcohol withdrawal seizures.  I feel he has appropriate medical decision making capacity to decline admission at this time.  We discussed withdrawal, seizures, DT's.  DEC/SW did not evaluate patient while here. The patient understood the plan and was discharged home in improved condition although encouraged to return with worseing symptoms.  All questions  answered.        Diagnosis:    ICD-10-CM    1. Alcohol withdrawal syndrome without complication (H)  F10.230    2. Sinus tachycardia  R00.0        Disposition:  Discharged to home.    Scribe Disclosure:  I, Rupert Mikechapo, am serving as a scribe at 9:57 PM on 12/2/2020 to document services personally performed by Mihir Winter MD based on my observations and the provider's statements to me.      Mihir Winter MD  12/03/20 0032

## 2020-12-03 NOTE — ED NOTES
2320 pt alert and oriented x3, speaking full clear sentences, respirations non-labored, skin warm dry and intact, strong pulses throughout, pt denies any medical complaints at this time, pt denies any hallucinations, headaches,nausea.  Pt does have visible tremors.  md ruiz aware of pt having having tremors and tachycardia.  md ruiz verbalizes pt is safe for dischrage. Iv removed.

## 2021-01-15 ENCOUNTER — HEALTH MAINTENANCE LETTER (OUTPATIENT)
Age: 41
End: 2021-01-15

## 2021-07-11 ENCOUNTER — HOSPITAL ENCOUNTER (EMERGENCY)
Facility: CLINIC | Age: 41
Discharge: HOME | End: 2021-07-11
Attending: EMERGENCY MEDICINE | Admitting: EMERGENCY MEDICINE
Payer: COMMERCIAL

## 2021-07-11 ENCOUNTER — APPOINTMENT (OUTPATIENT)
Dept: GENERAL RADIOLOGY | Facility: CLINIC | Age: 41
End: 2021-07-11
Attending: EMERGENCY MEDICINE
Payer: COMMERCIAL

## 2021-07-11 VITALS
BODY MASS INDEX: 26.52 KG/M2 | HEIGHT: 68 IN | TEMPERATURE: 98.6 F | WEIGHT: 175 LBS | RESPIRATION RATE: 18 BRPM | OXYGEN SATURATION: 98 % | DIASTOLIC BLOOD PRESSURE: 84 MMHG | HEART RATE: 114 BPM | SYSTOLIC BLOOD PRESSURE: 126 MMHG

## 2021-07-11 DIAGNOSIS — S82.892A CLOSED FRACTURE OF LEFT ANKLE, INITIAL ENCOUNTER: ICD-10-CM

## 2021-07-11 DIAGNOSIS — F10.920 ALCOHOLIC INTOXICATION WITHOUT COMPLICATION (H): ICD-10-CM

## 2021-07-11 LAB
ANION GAP SERPL CALCULATED.3IONS-SCNC: 8 MMOL/L (ref 3–14)
BASOPHILS # BLD AUTO: 0.1 10E3/UL (ref 0–0.2)
BASOPHILS NFR BLD AUTO: 1 %
BUN SERPL-MCNC: 10 MG/DL (ref 7–30)
CALCIUM SERPL-MCNC: 8.8 MG/DL (ref 8.5–10.1)
CHLORIDE BLD-SCNC: 106 MMOL/L (ref 94–109)
CO2 SERPL-SCNC: 28 MMOL/L (ref 20–32)
CREAT SERPL-MCNC: 1.05 MG/DL (ref 0.66–1.25)
EOSINOPHIL # BLD AUTO: 0 10E3/UL (ref 0–0.7)
EOSINOPHIL NFR BLD AUTO: 0 %
ERYTHROCYTE [DISTWIDTH] IN BLOOD BY AUTOMATED COUNT: 12.5 % (ref 10–15)
ETHANOL SERPL-MCNC: 0.38 G/DL
GFR SERPL CREATININE-BSD FRML MDRD: 89 ML/MIN/1.73M2
GLUCOSE BLD-MCNC: 98 MG/DL (ref 70–99)
HCT VFR BLD AUTO: 47.2 % (ref 40–53)
HGB BLD-MCNC: 16 G/DL (ref 13.3–17.7)
HOLD SPECIMEN: NORMAL
IMM GRANULOCYTES # BLD: 0 10E3/UL
IMM GRANULOCYTES NFR BLD: 1 %
LYMPHOCYTES # BLD AUTO: 2.2 10E3/UL (ref 0.8–5.3)
LYMPHOCYTES NFR BLD AUTO: 35 %
MCH RBC QN AUTO: 30.7 PG (ref 26.5–33)
MCHC RBC AUTO-ENTMCNC: 33.9 G/DL (ref 31.5–36.5)
MCV RBC AUTO: 91 FL (ref 78–100)
MONOCYTES # BLD AUTO: 0.4 10E3/UL (ref 0–1.3)
MONOCYTES NFR BLD AUTO: 6 %
NEUTROPHILS # BLD AUTO: 3.7 10E3/UL (ref 1.6–8.3)
NEUTROPHILS NFR BLD AUTO: 57 %
NRBC # BLD AUTO: 0 10E3/UL
NRBC BLD AUTO-RTO: 0 /100
PLATELET # BLD AUTO: 423 10E3/UL (ref 150–450)
POTASSIUM BLD-SCNC: 3.7 MMOL/L (ref 3.4–5.3)
RBC # BLD AUTO: 5.21 10E6/UL (ref 4.4–5.9)
SODIUM SERPL-SCNC: 142 MMOL/L (ref 133–144)
WBC # BLD AUTO: 6.4 10E3/UL (ref 4–11)

## 2021-07-11 PROCEDURE — 80048 BASIC METABOLIC PNL TOTAL CA: CPT | Performed by: EMERGENCY MEDICINE

## 2021-07-11 PROCEDURE — 36592 COLLECT BLOOD FROM PICC: CPT | Performed by: EMERGENCY MEDICINE

## 2021-07-11 PROCEDURE — 85025 COMPLETE CBC W/AUTO DIFF WBC: CPT | Performed by: EMERGENCY MEDICINE

## 2021-07-11 PROCEDURE — 258N000003 HC RX IP 258 OP 636: Performed by: EMERGENCY MEDICINE

## 2021-07-11 PROCEDURE — 82077 ASSAY SPEC XCP UR&BREATH IA: CPT | Performed by: EMERGENCY MEDICINE

## 2021-07-11 PROCEDURE — 29505 APPLICATION LONG LEG SPLINT: CPT | Mod: LT

## 2021-07-11 PROCEDURE — 73590 X-RAY EXAM OF LOWER LEG: CPT | Mod: LT

## 2021-07-11 PROCEDURE — 96374 THER/PROPH/DIAG INJ IV PUSH: CPT

## 2021-07-11 PROCEDURE — 36415 COLL VENOUS BLD VENIPUNCTURE: CPT | Performed by: EMERGENCY MEDICINE

## 2021-07-11 PROCEDURE — 73610 X-RAY EXAM OF ANKLE: CPT | Mod: LT

## 2021-07-11 PROCEDURE — 99285 EMERGENCY DEPT VISIT HI MDM: CPT | Mod: 25

## 2021-07-11 PROCEDURE — 250N000011 HC RX IP 250 OP 636: Performed by: EMERGENCY MEDICINE

## 2021-07-11 PROCEDURE — 73630 X-RAY EXAM OF FOOT: CPT | Mod: LT

## 2021-07-11 PROCEDURE — C9803 HOPD COVID-19 SPEC COLLECT: HCPCS

## 2021-07-11 PROCEDURE — 96361 HYDRATE IV INFUSION ADD-ON: CPT

## 2021-07-11 RX ORDER — ACETAMINOPHEN 500 MG
500-1000 TABLET ORAL EVERY 8 HOURS PRN
Qty: 60 TABLET | Refills: 0 | Status: SHIPPED | OUTPATIENT
Start: 2021-07-11 | End: 2022-08-03

## 2021-07-11 RX ORDER — OXYCODONE HYDROCHLORIDE 5 MG/1
5 TABLET ORAL EVERY 6 HOURS PRN
Qty: 12 TABLET | Refills: 0 | Status: SHIPPED | OUTPATIENT
Start: 2021-07-11 | End: 2022-08-03

## 2021-07-11 RX ORDER — IBUPROFEN 200 MG
800 TABLET ORAL EVERY 8 HOURS PRN
Qty: 60 TABLET | Refills: 0 | Status: SHIPPED | OUTPATIENT
Start: 2021-07-11 | End: 2022-08-03

## 2021-07-11 RX ORDER — HYDROMORPHONE HYDROCHLORIDE 1 MG/ML
0.5 INJECTION, SOLUTION INTRAMUSCULAR; INTRAVENOUS; SUBCUTANEOUS
Status: DISCONTINUED | OUTPATIENT
Start: 2021-07-11 | End: 2021-07-11 | Stop reason: HOSPADM

## 2021-07-11 RX ORDER — SODIUM CHLORIDE 9 MG/ML
INJECTION, SOLUTION INTRAVENOUS CONTINUOUS
Status: DISCONTINUED | OUTPATIENT
Start: 2021-07-11 | End: 2021-07-11 | Stop reason: HOSPADM

## 2021-07-11 RX ADMIN — SODIUM CHLORIDE 1000 ML: 9 INJECTION, SOLUTION INTRAVENOUS at 17:12

## 2021-07-11 RX ADMIN — HYDROMORPHONE HYDROCHLORIDE 0.5 MG: 1 INJECTION, SOLUTION INTRAMUSCULAR; INTRAVENOUS; SUBCUTANEOUS at 17:13

## 2021-07-11 ASSESSMENT — MIFFLIN-ST. JEOR: SCORE: 1683.29

## 2021-07-11 ASSESSMENT — ENCOUNTER SYMPTOMS
ABDOMINAL PAIN: 0
NECK PAIN: 0
BACK PAIN: 0
HEADACHES: 0
JOINT SWELLING: 1

## 2021-07-11 NOTE — ED TRIAGE NOTES
Pt was drinking at a neighbors (doesn't drink regularly) and fell walking into the door. Left ankle rolled, heard a crack. A&O x 4. CMS intact.

## 2021-07-11 NOTE — ED PROVIDER NOTES
"  History   Chief Complaint:  Ankle Pain      HPI   Dariusz Ferreira is a 40 year old male, with a history of hypertension, who presents to the ED for evaluation of left ankle injury. The patient reports he was at his neighbor's house drinking, which he states he does not normally do. He had a total of 5 shots of alcohol. He fell walking into the door. He did not trip over anything. He did not hit his head, neck, back, chest or abdomen. He was unable to get up and he was the one to call the ambulance. Upon eval, complains of left ankle pain but no pain in the left knee or hip or other extremities.     Review of Systems   Gastrointestinal: Negative for abdominal pain.   Musculoskeletal: Positive for joint swelling. Negative for back pain and neck pain.   Neurological: Negative for headaches.   All other systems reviewed and are negative.    Allergies:  Amoxicillin  Penicillins  Erythromycin    Medications:    Tadalafil  Viagra  Lamictal  Atarax  Wellbutrin  Luvox    Past Medical History:    Depression  Alcohol abuse  Tobacco use disorder  Insomnia due to anxiety and fear  Anxiety  Methamphetamine abuse  Hypertension  Asthma    Past Surgical History:    Mucus cyst removal from tongue  Tonsillectomy     Family History:    Hypertension  Alcohol/drug abuse  Anemia  Depression  Asthma  CVD  Thyroid disease    Social History:  Marital Status:   PCP: [facility]  Presents to the ED with EMS    Physical Exam     Patient Vitals for the past 24 hrs:   BP Temp Temp src Pulse Resp SpO2 Height Weight   07/11/21 1636 -- 98.6  F (37  C) Oral -- -- -- -- --   07/11/21 1631 126/84 -- -- 114 18 98 % 1.727 m (5' 8\") 79.4 kg (175 lb)       Physical Exam  VS: Reviewed per above  HENT: Mucous membranes moist, no nuchal rigidity.  No external signs of head trauma.  EYES: sclera anicteric  CV: Rate as noted,  regular rhythm.   RESP: Effort normal. Breath sounds are normal bilaterally.  GI: no tenderness/rebound/guarding, " not distended.  NEURO: GCS 15, cranial nerves II through XII are intact, 5 out of 5 strength in all 4 extremities, sensation is intact light touch in all 4 extremities  MSK: No deformity of the extremities, soft tissue swelling and tenderness about the left ankle.  Intact left DP pulse.  No midline vertebral tenderness.  SKIN: Warm and dry    Emergency Department Course   Imaging:  Radiology findings were communicated with the patient who voiced understanding of the findings.    XR Ankle, 3 views, Right:  Anatomic alignment left tibia and fibula. Mildly displaced distal left tibia and fibula fractures. The distal fibula fracture extends to the level of the ankle joint line. Oblique intra-articular fracture through the base of the medial malleolus. No definitive posterior malleolus fracture. Circumferential ankle soft tissue swelling more pronounced anterior and lateral. Tibiotalar and hindfoot joint spaces are normal.     Tarsal bones and metatarsals intact. No acute toe fracture identified. Chronic appearing bone fragment along the medial margin of the great toe proximal phalangeal base. No significant foot joint space narrowing.    XR Foot, 3 views, Right:  Anatomic alignment left tibia and fibula. Mildly displaced distal left tibia and fibula fractures. The distal fibula fracture extends to the level of the ankle joint line. Oblique intra-articular fracture through the base of the medial malleolus. No definitive posterior malleolus fracture. Circumferential ankle soft tissue swelling more pronounced anterior and lateral. Tibiotalar and hindfoot joint spaces are normal.     Tarsal bones and metatarsals intact. No acute toe fracture identified. Chronic appearing bone fragment along the medial margin of the great toe proximal phalangeal base. No significant foot joint space narrowing.    XR Tibia & Fibula, 2 views, Right:  Anatomic alignment left tibia and fibula. Mildly displaced distal left tibia and fibula  fractures. The distal fibula fracture extends to the level of the ankle joint line. Oblique intra-articular fracture through the base of the medial malleolus. No definitive posterior malleolus fracture. Circumferential ankle soft tissue swelling more pronounced anterior and lateral. Tibiotalar and hindfoot joint spaces are normal.     Tarsal bones and metatarsals intact. No acute toe fracture identified. Chronic appearing bone fragment along the medial margin of the great toe proximal phalangeal base. No significant foot joint space narrowing.  Reading per radiology    Laboratory:  Laboratory findings were communicated with the patient who voiced understanding of the findings.    CBC: WBC: 6.4, HGB: 16.0, PLT: 423    BMP: WNL (Creatinine: 1.05)    Alcohol level blood: 0.38 (H)     Procedures    Splint Placement   Custom plaster long leg posterior slab with stirrup splint was applied to the left lower extremity and after placement I checked and adjusted the fit to ensure proper positioning.  The patient was more comfortable with the splint in place.  Sensation and circulation are intact after splint placement.    Emergency Department Course:    Reviewed:  I reviewed the patient's nursing notes, vitals, past medical records, Care Everywhere.     Assessments:  1631 I first assessed the patient and performed an exam. Discussed plans for care.    1720 Placed splint on ankle, as noted above.    1800 I rechecked the patient and updated them on their results. Discussed plans for discharge.    Interventions:  1712: NS 1L IV Bolus   1713: Dilaudid 0.5 mg IV    Disposition:  The patient was discharged to home.     Impression & Plan      Medical Decision Making:   Dariusz Ferreira is a 40 year old male who presents with left ankle pain and swelling after fall while intoxicated.  On arrival vital signs are notable for low-grade regular tachycardia.  On exam there is soft tissue swelling and tenderness about the left ankle.  No  evidence of neurovascular compromise to the left lower extremity distally.  X-ray shows evidence of mildly displaced distal left tibia and fibula fractures.  Patient was placed in custom plaster long-leg splint to the left lower extremity.  He was provided with crutches.  Blood alcohol level was 0.38 although patient was fairly coherent for this value.  It does appear per chart review that he does drink pretty regularly.  He was discharged in the care of his .  Patient is referred to orthopedics.  Discussed importance of laxatives while taking opiate pain medication.  Return precautions were discussed with patient and spouse prior to discharge.    Diagnosis:     ICD-10-CM    1. Alcoholic intoxication without complication (H)  F10.920    2. Closed fracture of left ankle, initial encounter  S82.890N        Discharge Medications:  Discharge Medication List as of 7/11/2021  6:17 PM      START taking these medications    Details   acetaminophen (TYLENOL) 500 MG tablet Take 1-2 tablets (500-1,000 mg) by mouth every 8 hours as needed for pain, Disp-60 tablet, R-0, Local Print      ibuprofen (ADVIL/MOTRIN) 200 MG tablet Take 4 tablets (800 mg) by mouth every 8 hours as needed for pain, Disp-60 tablet, R-0, Local Print      oxyCODONE (ROXICODONE) 5 MG tablet Take 1 tablet (5 mg) by mouth every 6 hours as needed for severe pain, Disp-12 tablet, R-0, Local Print              Scribe Disclosure:  I, Zita Nguyen, am serving as a scribe on 7/11/2021 at 4:31 PM to personally document services performed by Juan David Leigh MD based on my observations and the provider's statements to me.         Juan David Leigh MD  07/11/21 9351

## 2021-07-11 NOTE — ED NOTES
Bed: ED33  Expected date: 7/11/21  Expected time: 4:12 PM  Means of arrival: Ambulance  Comments:  [place] [address] ankle pain

## 2021-07-11 NOTE — DISCHARGE INSTRUCTIONS
KEEP SPLINT DRY. IT WILL FALL APART IF IT GETS WET.     Discharge Instructions  Splint Care    You had a splint put on today to help protect your injury and help it heal.  Splints are used to treat things like strains, sprains, large cuts, and fractures (broken bones). Splints are temporary and are designed to allow for swelling.    Be sure your splint is not too tight!  If you splint is too tight, it may cause loss of blood supply.  Signs of your splint being too tight include: your arm or leg hurting a lot more; your fingers or toes getting numb, cold, pale or blue; or your child is crying, fussing or seeming restless.    Generally, every Emergency Department visit should have a follow-up clinic visit with either a primary or a specialty clinic/provider. Please follow-up as instructed by your emergency provider today.  Return to the Emergency Department right away if:  You have increased pain or pressure around the injury.  You have numbness, tingling, or cool, pale, or blue toes or fingers past the injury.  Your child is more fussy than normal, crying a lot, or restless.  Your splint becomes soft, breaks, or is wet.  Your splint begins to smell bad.  Your splint is cutting into your skin.    Home care:  Keep the injured area above the level of your heart while laying or sitting down.  This will help decrease the swelling and the pain.  Keep the splint dry.  Do not put objects down or inside the splint.  If there is an elastic bandage (Ace  wrap) holding the splint on this may be loosened slightly to relieve pressure or pain.  If pain continues return to the Emergency Department right away.  Do not remove your splint by yourself unless told to by your provider.    Follow-up:  Sometimes the splint put on in the Emergency Department needs to be changed once the swelling has gone down and a more permanent cast needs to be placed.  This is usually done by a bone specialist provider (Orthopedist).  Follow the  instructions given to you by your provider today.    If you were given a prescription for medicine here today, be sure to read all of the information (including the package insert) that comes with your prescription.  This will include important information about the medicine, its side effects, and any warnings that you need to know about.  The pharmacist who fills the prescription can provide more information and answer questions you may have about the medicine.  If you have questions or concerns that the pharmacist cannot address, please call or return to the Emergency Department.     Remember that you can always come back to the Emergency Department if you are not able to see your regular provider in the amount of time listed above, if you get any new symptoms, or if there is anything that worries you.

## 2021-10-24 ENCOUNTER — HEALTH MAINTENANCE LETTER (OUTPATIENT)
Age: 41
End: 2021-10-24

## 2021-10-30 ENCOUNTER — HOSPITAL ENCOUNTER (EMERGENCY)
Facility: CLINIC | Age: 41
Discharge: HOME | End: 2021-10-30
Attending: EMERGENCY MEDICINE | Admitting: EMERGENCY MEDICINE
Payer: COMMERCIAL

## 2021-10-30 VITALS
RESPIRATION RATE: 20 BRPM | OXYGEN SATURATION: 91 % | HEART RATE: 115 BPM | BODY MASS INDEX: 25.76 KG/M2 | HEIGHT: 68 IN | WEIGHT: 170 LBS | DIASTOLIC BLOOD PRESSURE: 90 MMHG | TEMPERATURE: 97.9 F | SYSTOLIC BLOOD PRESSURE: 139 MMHG

## 2021-10-30 DIAGNOSIS — E87.6 HYPOKALEMIA: ICD-10-CM

## 2021-10-30 DIAGNOSIS — F10.930 ALCOHOL WITHDRAWAL SYNDROME WITHOUT COMPLICATION (H): ICD-10-CM

## 2021-10-30 DIAGNOSIS — R11.2 NAUSEA AND VOMITING, INTRACTABILITY OF VOMITING NOT SPECIFIED, UNSPECIFIED VOMITING TYPE: ICD-10-CM

## 2021-10-30 LAB
ALBUMIN SERPL-MCNC: 3.6 G/DL (ref 3.4–5)
ALP SERPL-CCNC: 94 U/L (ref 40–150)
ALT SERPL W P-5'-P-CCNC: 57 U/L (ref 0–70)
ANION GAP SERPL CALCULATED.3IONS-SCNC: 12 MMOL/L (ref 3–14)
AST SERPL W P-5'-P-CCNC: 34 U/L (ref 0–45)
ATRIAL RATE - MUSE: 94 BPM
BASOPHILS # BLD AUTO: 0 10E3/UL (ref 0–0.2)
BASOPHILS NFR BLD AUTO: 0 %
BILIRUB SERPL-MCNC: 1 MG/DL (ref 0.2–1.3)
BUN SERPL-MCNC: 12 MG/DL (ref 7–30)
CALCIUM SERPL-MCNC: 8.3 MG/DL (ref 8.5–10.1)
CHLORIDE BLD-SCNC: 101 MMOL/L (ref 94–109)
CO2 SERPL-SCNC: 22 MMOL/L (ref 20–32)
CREAT SERPL-MCNC: 0.76 MG/DL (ref 0.66–1.25)
DIASTOLIC BLOOD PRESSURE - MUSE: NORMAL MMHG
EOSINOPHIL # BLD AUTO: 0 10E3/UL (ref 0–0.7)
EOSINOPHIL NFR BLD AUTO: 0 %
ERYTHROCYTE [DISTWIDTH] IN BLOOD BY AUTOMATED COUNT: 12.1 % (ref 10–15)
ETHANOL SERPL-MCNC: <0.01 G/DL
GFR SERPL CREATININE-BSD FRML MDRD: >90 ML/MIN/1.73M2
GLUCOSE BLD-MCNC: 155 MG/DL (ref 70–99)
HCT VFR BLD AUTO: 42.7 % (ref 40–53)
HGB BLD-MCNC: 15 G/DL (ref 13.3–17.7)
IMM GRANULOCYTES # BLD: 0 10E3/UL
IMM GRANULOCYTES NFR BLD: 0 %
INTERPRETATION ECG - MUSE: NORMAL
LIPASE SERPL-CCNC: 131 U/L (ref 73–393)
LYMPHOCYTES # BLD AUTO: 0.6 10E3/UL (ref 0.8–5.3)
LYMPHOCYTES NFR BLD AUTO: 6 %
MAGNESIUM SERPL-MCNC: 1.9 MG/DL (ref 1.6–2.3)
MCH RBC QN AUTO: 29.6 PG (ref 26.5–33)
MCHC RBC AUTO-ENTMCNC: 35.1 G/DL (ref 31.5–36.5)
MCV RBC AUTO: 84 FL (ref 78–100)
MONOCYTES # BLD AUTO: 0.5 10E3/UL (ref 0–1.3)
MONOCYTES NFR BLD AUTO: 5 %
NEUTROPHILS # BLD AUTO: 8.9 10E3/UL (ref 1.6–8.3)
NEUTROPHILS NFR BLD AUTO: 89 %
NRBC # BLD AUTO: 0 10E3/UL
NRBC BLD AUTO-RTO: 0 /100
P AXIS - MUSE: 70 DEGREES
PLATELET # BLD AUTO: 322 10E3/UL (ref 150–450)
POTASSIUM BLD-SCNC: 3.3 MMOL/L (ref 3.4–5.3)
PR INTERVAL - MUSE: 158 MS
PROT SERPL-MCNC: 7 G/DL (ref 6.8–8.8)
QRS DURATION - MUSE: 84 MS
QT - MUSE: 392 MS
QTC - MUSE: 490 MS
R AXIS - MUSE: 63 DEGREES
RBC # BLD AUTO: 5.06 10E6/UL (ref 4.4–5.9)
SODIUM SERPL-SCNC: 135 MMOL/L (ref 133–144)
SYSTOLIC BLOOD PRESSURE - MUSE: NORMAL MMHG
T AXIS - MUSE: 73 DEGREES
VENTRICULAR RATE- MUSE: 94 BPM
WBC # BLD AUTO: 9.9 10E3/UL (ref 4–11)

## 2021-10-30 PROCEDURE — 96376 TX/PRO/DX INJ SAME DRUG ADON: CPT

## 2021-10-30 PROCEDURE — 83735 ASSAY OF MAGNESIUM: CPT | Performed by: EMERGENCY MEDICINE

## 2021-10-30 PROCEDURE — 96365 THER/PROPH/DIAG IV INF INIT: CPT

## 2021-10-30 PROCEDURE — 85025 COMPLETE CBC W/AUTO DIFF WBC: CPT | Performed by: EMERGENCY MEDICINE

## 2021-10-30 PROCEDURE — 99285 EMERGENCY DEPT VISIT HI MDM: CPT | Mod: 25

## 2021-10-30 PROCEDURE — 80053 COMPREHEN METABOLIC PANEL: CPT | Performed by: EMERGENCY MEDICINE

## 2021-10-30 PROCEDURE — 36415 COLL VENOUS BLD VENIPUNCTURE: CPT | Performed by: EMERGENCY MEDICINE

## 2021-10-30 PROCEDURE — 250N000009 HC RX 250: Performed by: EMERGENCY MEDICINE

## 2021-10-30 PROCEDURE — 96361 HYDRATE IV INFUSION ADD-ON: CPT

## 2021-10-30 PROCEDURE — 93005 ELECTROCARDIOGRAM TRACING: CPT

## 2021-10-30 PROCEDURE — 83690 ASSAY OF LIPASE: CPT | Performed by: EMERGENCY MEDICINE

## 2021-10-30 PROCEDURE — 96375 TX/PRO/DX INJ NEW DRUG ADDON: CPT

## 2021-10-30 PROCEDURE — 258N000003 HC RX IP 258 OP 636: Performed by: EMERGENCY MEDICINE

## 2021-10-30 PROCEDURE — 250N000011 HC RX IP 250 OP 636: Performed by: EMERGENCY MEDICINE

## 2021-10-30 PROCEDURE — 250N000013 HC RX MED GY IP 250 OP 250 PS 637: Performed by: EMERGENCY MEDICINE

## 2021-10-30 PROCEDURE — 82077 ASSAY SPEC XCP UR&BREATH IA: CPT | Performed by: EMERGENCY MEDICINE

## 2021-10-30 RX ORDER — LORAZEPAM 2 MG/ML
1 INJECTION INTRAMUSCULAR ONCE
Status: COMPLETED | OUTPATIENT
Start: 2021-10-30 | End: 2021-10-30

## 2021-10-30 RX ORDER — POTASSIUM CHLORIDE 1500 MG/1
40 TABLET, EXTENDED RELEASE ORAL ONCE
Status: COMPLETED | OUTPATIENT
Start: 2021-10-30 | End: 2021-10-30

## 2021-10-30 RX ORDER — CHLORDIAZEPOXIDE HYDROCHLORIDE 25 MG/1
25-50 CAPSULE, GELATIN COATED ORAL 3 TIMES DAILY PRN
Qty: 12 CAPSULE | Refills: 0 | Status: SHIPPED | OUTPATIENT
Start: 2021-10-30 | End: 2022-08-03

## 2021-10-30 RX ORDER — LORAZEPAM 2 MG/ML
0.5 INJECTION INTRAMUSCULAR ONCE
Status: COMPLETED | OUTPATIENT
Start: 2021-10-30 | End: 2021-10-30

## 2021-10-30 RX ORDER — ONDANSETRON 4 MG/1
4 TABLET, ORALLY DISINTEGRATING ORAL EVERY 8 HOURS PRN
Qty: 10 TABLET | Refills: 0 | Status: SHIPPED | OUTPATIENT
Start: 2021-10-30 | End: 2021-11-02

## 2021-10-30 RX ADMIN — LORAZEPAM 0.5 MG: 2 INJECTION INTRAMUSCULAR; INTRAVENOUS at 05:05

## 2021-10-30 RX ADMIN — POTASSIUM CHLORIDE 40 MEQ: 1500 TABLET, EXTENDED RELEASE ORAL at 05:04

## 2021-10-30 RX ADMIN — SODIUM CHLORIDE 1000 ML: 9 INJECTION, SOLUTION INTRAVENOUS at 04:26

## 2021-10-30 RX ADMIN — FOLIC ACID: 5 INJECTION, SOLUTION INTRAMUSCULAR; INTRAVENOUS; SUBCUTANEOUS at 05:35

## 2021-10-30 RX ADMIN — LORAZEPAM 1 MG: 2 INJECTION INTRAMUSCULAR; INTRAVENOUS at 04:06

## 2021-10-30 ASSESSMENT — MIFFLIN-ST. JEOR: SCORE: 1655.61

## 2021-10-30 NOTE — DISCHARGE INSTRUCTIONS
Avoid alcohol use  Can use the librium for symptoms of alcohol withdrawal but can't use when drinking alcohol. Can't drive when taking this medication  Zofran for nausea

## 2021-10-30 NOTE — ED PROVIDER NOTES
History   Chief Complaint:  Dizziness and Withdrawal       HPI   Dariusz Ferreira is a 41 year old male with history of alcohol abuse who presented to the ED with dizziness and withdrawal.  Reports having nausea and vomiting.  Last drink of alcohol was on Thursday.  Feels that he is in alcohol withdrawal.  Normal bowel movements.  No fever or chills.  No chest pain or shortness of breath.  Also having dizziness.  Apparently was unable to ambulate when EMS arrived at his house.    Review of Systems  +dizziness, nausea, vomiting  Denies fever, chills, urinary symptoms, headache  10 point review of systems was obtained and negative other than mentioned above.    Allergies:  Penicillins  Erythromycin    Medications:    acetaminophen (TYLENOL) 500 MG tablet  buPROPion (WELLBUTRIN XL) 300 MG 24 hr tablet  fluticasone (FLONASE) 50 MCG/ACT nasal spray  fluvoxaMINE (LUVOX) 100 MG tablet  hydrOXYzine (ATARAX) 50 MG tablet  ibuprofen (ADVIL/MOTRIN) 200 MG tablet  lamoTRIgine (LAMICTAL) 100 MG tablet  oxyCODONE (ROXICODONE) 5 MG tablet  tadalafil (CIALIS) 20 MG tablet      Past Medical History:       Past Medical History:   Diagnosis Date     Anxiety      Depressive disorder      Hypertension      Medical history unknown      Mild intermittent asthma      Patient Active Problem List   Diagnosis     Other specified conditions of the tongue     Tobacco use disorder     Anxiety     Mild major depression (H)     CARDIOVASCULAR SCREENING; LDL GOAL LESS THAN 160     Rib contusion     Hypertension     Alcohol use disorder, severe, in sustained remission (H)         Past Surgical History:    Past Surgical History:   Procedure Laterality Date     mucous cyst removed from tongue  2003     TONSILLECTOMY  1987        Family History:      Family History   Problem Relation Age of Onset     Hypertension Mother      Alcohol/Drug Mother      Blood Disease Mother         anemia     Depression Mother      Respiratory Mother         asthma      Cerebrovascular Disease Mother      Cerebrovascular Disease Maternal Grandfather      Alcohol/Drug Father      Alcohol/Drug Sister      Thyroid Disease Sister         uncertain what type, sounds like Grave's disease     Social History:  Social History     Socioeconomic History     Marital status:      Spouse name: Not on file     Number of children: Not on file     Years of education: Not on file     Highest education level: Not on file   Occupational History     Not on file   Tobacco Use     Smoking status: Current Some Day Smoker     Packs/day: 0.50     Years: 8.00     Pack years: 4.00     Types: Cigarettes     Smokeless tobacco: Never Used     Tobacco comment: 5/28/13 - using chantix, smokes when he drinks   Substance and Sexual Activity     Alcohol use: Yes     Comment: review     Drug use: No     Comment: Used to use cannabis, meth.     Sexual activity: Yes     Partners: Male   Other Topics Concern     Parent/sibling w/ CABG, MI or angioplasty before 65F 55M? Not Asked   Social History Narrative     Not on file     Social Determinants of Health     Financial Resource Strain:      Difficulty of Paying Living Expenses:    Food Insecurity:      Worried About Running Out of Food in the Last Year:      Ran Out of Food in the Last Year:    Transportation Needs:      Lack of Transportation (Medical):      Lack of Transportation (Non-Medical):    Physical Activity:      Days of Exercise per Week:      Minutes of Exercise per Session:    Stress:      Feeling of Stress :    Social Connections:      Frequency of Communication with Friends and Family:      Frequency of Social Gatherings with Friends and Family:      Attends [employer] Services:      Active Member of Clubs or Organizations:      Attends Club or Organization Meetings:      Marital Status:    Intimate Partner Violence:      Fear of Current or Ex-Partner:      Emotionally Abused:      Physically Abused:      Sexually Abused:        Physical Exam  "    Patient Vitals for the past 24 hrs:   BP Temp Temp src Pulse Resp SpO2 Height Weight   10/30/21 0256 (!) 135/97 97.9  F (36.6  C) Oral 107 20 95 % 1.727 m (5' 8\") 77.1 kg (170 lb)       Physical Exam  General: Sitting up in bed  Eyes:  The pupils are equal and round    Conjunctivae and sclerae are normal  ENT:    Wearing a mask  Neck:  Normal range of motion  CV:  Tachycardic rate, regular rhythm    Skin warm and well perfused   Resp:  Non labored breathing on room air    No tachypnea    No cough heard    Lungs clear bilaterally  GI:  Abdomen is soft, there is no rigidity    No distension    No rebound tenderness     No abdominal tenderness  MS:  Tongue fasciculations and bilateral hand tremors  Skin:  No rash or acute skin lesions noted  Neuro:   Awake, alert.      Speech is normal and fluent.    Face is symmetric.     Moves all extremities equally    SILT on bilateral UE/LE    No arm or leg drift  Psych: Normal affect.  Appropriate interactions.    Emergency Department Course   ECG  ECG obtained at 0301, ECG read at 0349  Normal sinus rhythm. Prolonged QT. Abnormal ECG.    No significant change as compared to prior, dated 8/2/20.  Rate 94 bpm. DE interval 158 ms. QRS duration 84 ms. QT/QTc 392/490 ms. P-R-T axes 70 63 73.     Laboratory:  Labs Ordered and Resulted from Time of ED Arrival to Time of ED Departure   COMPREHENSIVE METABOLIC PANEL - Abnormal       Result Value    Sodium 135      Potassium 3.3 (*)     Chloride 101      Carbon Dioxide (CO2)        Anion Gap        Urea Nitrogen        Creatinine        Calcium        Glucose        Alkaline Phosphatase        AST        ALT        Protein Total        Albumin        Bilirubin Total        GFR Estimate       CBC WITH PLATELETS AND DIFFERENTIAL - Abnormal    WBC Count 9.9      RBC Count 5.06      Hemoglobin 15.0      Hematocrit 42.7      MCV 84      MCH 29.6      MCHC 35.1      RDW 12.1      Platelet Count 322      % Neutrophils 89      % Lymphocytes 6 "      % Monocytes 5      % Eosinophils 0      % Basophils 0      % Immature Granulocytes 0      NRBCs per 100 WBC 0      Absolute Neutrophils 8.9 (*)     Absolute Lymphocytes 0.6 (*)     Absolute Monocytes 0.5      Absolute Eosinophils 0.0      Absolute Basophils 0.0      Absolute Immature Granulocytes 0.0      Absolute NRBCs 0.0     ETHYL ALCOHOL LEVEL   MAGNESIUM      Emergency Department Course:  Reviewed:  I reviewed nursing notes, vitals and past medical history    Assessments:   I obtained history and examined the patient as noted above.    I rechecked the patient. Patient improved. Ambulated in ED.     Interventions:   Ativan   IV fluids   Banana bag    Disposition:  The patient was discharged to home.     Impression & Plan       Medical Decision Making:  Dariusz Ferreira is a 41 year old male who presented to the emergency department with withdrawal.  Is having evidence of alcohol withdrawal.  No abdominal tenderness on exam.  Labs show mild hypokalemia likely from vomiting.  Dizziness likely from the withdrawal as well.  Has a nonfocal neurologic exam and doubt intracranial pathology.  Patient feeling better after benzodiazepines and fluids.  Was able to ambulate here in the emergency department.  He has no history of alcohol withdrawal seizures.  He declines hospital admission.  Declines detox.  He does live with his  and so given this will do limited supply of librium and zofran for home. Declines resources. Follow up with primary care provider     Diagnosis:    ICD-10-CM    1. Alcohol withdrawal syndrome without complication (H)  F10.230    2. Nausea and vomiting, intractability of vomiting not specified, unspecified vomiting type  R11.2    3. Hypokalemia  E87.6        Discharge Medications:  New Prescriptions    CHLORDIAZEPOXIDE (LIBRIUM) 25 MG CAPSULE    Take 1-2 capsules (25-50 mg) by mouth 3 times daily as needed for anxiety or withdrawal    ONDANSETRON (ZOFRAN ODT) 4 MG ODT TAB    Take 1  tablet (4 mg) by mouth every 8 hours as needed for nausea or vomiting     Scribe Disclosure:  I, Arcelia Hidalgo MD, am serving as a scribe at 3:39 AM on 10/30/2021 to document services personally performed by Arcelia Hidalgo MD based on my observations and the provider's statements to me.              Arcelia Hidalgo MD  10/30/21 0576

## 2021-10-30 NOTE — ED NOTES
Bed: ED07  Expected date:   Expected time:   Means of arrival:   Comments:  [facility] 424 41M withdrawal  eta [id number]

## 2021-10-30 NOTE — ED TRIAGE NOTES
"Comes from home by EMS, reports feeling dizzy, nausea and vomiting since this afternoon. Pt unable to ambulate due to dizziness. Reports falling/\"blacking out\" three times today. Pt reports feeling like he is withdrawing from alcohol as well, last drink Thursday at 9pm. Drinking fluctuates; unable to give clear answer of how much he drinks. Reports he has been to detox in the past. Per EMS patient reports unable to stand due to his dizziness, given Zofran, NS bolus 500cc and droperidol 2.5mg IV en route. VSS for transport.   "

## 2021-10-30 NOTE — ED NOTES
Pt walked to the bathroom without assistance. Pt noted to be slightly unsteady. Pt denies being dizzy.

## 2022-02-13 ENCOUNTER — HEALTH MAINTENANCE LETTER (OUTPATIENT)
Age: 42
End: 2022-02-13

## 2022-08-03 ENCOUNTER — OFFICE VISIT (OUTPATIENT)
Dept: INTERNAL MEDICINE | Facility: CLINIC | Age: 42
End: 2022-08-03
Payer: COMMERCIAL

## 2022-08-03 VITALS
HEART RATE: 76 BPM | TEMPERATURE: 97.5 F | SYSTOLIC BLOOD PRESSURE: 118 MMHG | OXYGEN SATURATION: 99 % | WEIGHT: 175.2 LBS | DIASTOLIC BLOOD PRESSURE: 80 MMHG | BODY MASS INDEX: 26.64 KG/M2

## 2022-08-03 DIAGNOSIS — M54.2 NECK PAIN: Primary | ICD-10-CM

## 2022-08-03 DIAGNOSIS — R21 RASH: ICD-10-CM

## 2022-08-03 PROCEDURE — 99203 OFFICE O/P NEW LOW 30 MIN: CPT | Performed by: INTERNAL MEDICINE

## 2022-08-03 ASSESSMENT — PATIENT HEALTH QUESTIONNAIRE - PHQ9
10. IF YOU CHECKED OFF ANY PROBLEMS, HOW DIFFICULT HAVE THESE PROBLEMS MADE IT FOR YOU TO DO YOUR WORK, TAKE CARE OF THINGS AT HOME, OR GET ALONG WITH OTHER PEOPLE: SOMEWHAT DIFFICULT
SUM OF ALL RESPONSES TO PHQ QUESTIONS 1-9: 3
SUM OF ALL RESPONSES TO PHQ QUESTIONS 1-9: 3

## 2022-08-03 NOTE — PROGRESS NOTES
"  Assessment & Plan     Neck pain  Rash  -Exam is largely unremarkable on the left side of the neck other than small cystlike mass is likely a sebaceous cyst.  Recommend monitoring this if it seems to get more irritated your inflamed.  -He feels his rash is related to recent use of THC Gummies and he noted this afterwards.  States it is improving.  Continue to monitor               BMI:   Estimated body mass index is 26.64 kg/m  as calculated from the following:    Height as of 10/30/21: 1.727 m (5' 8\").    Weight as of this encounter: 79.5 kg (175 lb 3.2 oz).           No follow-ups on file.    Shekhar Prescott MD  [facility]    Fatou Arzola is a 42 year old, presenting for the following health issues:  Mass      History of Present Illness       Reason for visit:  Lump below ear  Symptom onset:  3-7 days ago    He eats 0-1 servings of fruits and vegetables daily.He consumes 0 sweetened beverage(s) daily.He exercises with enough effort to increase his heart rate 10 to 19 minutes per day.  He exercises with enough effort to increase his heart rate 3 or less days per week.   He is taking medications regularly.    Today's PHQ-9         PHQ-9 Total Score: 3    PHQ-9 Q9 Thoughts of better off dead/self-harm past 2 weeks :   Not at all    How difficult have these problems made it for you to do your work, take care of things at home, or get along with other people: Somewhat difficult       Review of Systems         Objective    /80   Pulse 76   Temp 97.5  F (36.4  C) (Temporal)   Wt 79.5 kg (175 lb 3.2 oz)   SpO2 99%   BMI 26.64 kg/m    Body mass index is 26.64 kg/m .  Physical Exam   GENERAL: healthy, alert and no distress  HENT: ear canals and TM's normal, nose and mouth without ulcers or lesions.  In the posterior auricular region on the left there is small smooth subcutaneous area of fullness seems most consistent with small sebaceous cyst  NECK: no adenopathy, no " asymmetry, masses, or scars and thyroid normal to palpation  SKIN: Maculopapular scattered rash in the lower extremities and back.  No vesicular lesions are noted                    .  ..

## 2022-10-16 ENCOUNTER — HEALTH MAINTENANCE LETTER (OUTPATIENT)
Age: 42
End: 2022-10-16

## 2023-03-26 ENCOUNTER — HEALTH MAINTENANCE LETTER (OUTPATIENT)
Age: 43
End: 2023-03-26

## 2023-04-10 ENCOUNTER — HOSPITAL ENCOUNTER (EMERGENCY)
Facility: CLINIC | Age: 43
Discharge: HOME | End: 2023-04-10
Attending: EMERGENCY MEDICINE | Admitting: EMERGENCY MEDICINE
Payer: COMMERCIAL

## 2023-04-10 ENCOUNTER — APPOINTMENT (OUTPATIENT)
Dept: CT IMAGING | Facility: CLINIC | Age: 43
End: 2023-04-10
Attending: EMERGENCY MEDICINE
Payer: COMMERCIAL

## 2023-04-10 VITALS
TEMPERATURE: 97.1 F | DIASTOLIC BLOOD PRESSURE: 94 MMHG | HEART RATE: 98 BPM | RESPIRATION RATE: 17 BRPM | OXYGEN SATURATION: 95 % | SYSTOLIC BLOOD PRESSURE: 117 MMHG

## 2023-04-10 DIAGNOSIS — R07.9 CHEST PAIN, UNSPECIFIED TYPE: ICD-10-CM

## 2023-04-10 DIAGNOSIS — F10.930 ALCOHOL WITHDRAWAL, UNCOMPLICATED (H): ICD-10-CM

## 2023-04-10 DIAGNOSIS — R91.8 PULMONARY NODULES: ICD-10-CM

## 2023-04-10 LAB
ALBUMIN SERPL BCG-MCNC: 4.9 G/DL (ref 3.5–5.2)
ALP SERPL-CCNC: 99 U/L (ref 40–129)
ALT SERPL W P-5'-P-CCNC: 73 U/L (ref 10–50)
ANION GAP SERPL CALCULATED.3IONS-SCNC: 14 MMOL/L (ref 7–15)
AST SERPL W P-5'-P-CCNC: 59 U/L (ref 10–50)
BASOPHILS # BLD AUTO: 0 10E3/UL (ref 0–0.2)
BASOPHILS NFR BLD AUTO: 0 %
BILIRUB SERPL-MCNC: 0.6 MG/DL
BUN SERPL-MCNC: 10 MG/DL (ref 6–20)
CALCIUM SERPL-MCNC: 10.2 MG/DL (ref 8.6–10)
CHLORIDE SERPL-SCNC: 98 MMOL/L (ref 98–107)
CREAT SERPL-MCNC: 0.92 MG/DL (ref 0.67–1.17)
D DIMER PPP FEU-MCNC: 0.62 UG/ML FEU (ref 0–0.5)
DEPRECATED HCO3 PLAS-SCNC: 23 MMOL/L (ref 22–29)
EOSINOPHIL # BLD AUTO: 0 10E3/UL (ref 0–0.7)
EOSINOPHIL NFR BLD AUTO: 0 %
ERYTHROCYTE [DISTWIDTH] IN BLOOD BY AUTOMATED COUNT: 12.6 % (ref 10–15)
ETHANOL SERPL-MCNC: <0.01 G/DL
GFR SERPL CREATININE-BSD FRML MDRD: >90 ML/MIN/1.73M2
GLUCOSE SERPL-MCNC: 146 MG/DL (ref 70–99)
HCT VFR BLD AUTO: 47.4 % (ref 40–53)
HGB BLD-MCNC: 15.6 G/DL (ref 13.3–17.7)
IMM GRANULOCYTES # BLD: 0 10E3/UL
IMM GRANULOCYTES NFR BLD: 0 %
LIPASE SERPL-CCNC: 80 U/L (ref 13–60)
LYMPHOCYTES # BLD AUTO: 0.8 10E3/UL (ref 0.8–5.3)
LYMPHOCYTES NFR BLD AUTO: 8 %
MAGNESIUM SERPL-MCNC: 2.3 MG/DL (ref 1.7–2.3)
MCH RBC QN AUTO: 29.3 PG (ref 26.5–33)
MCHC RBC AUTO-ENTMCNC: 32.9 G/DL (ref 31.5–36.5)
MCV RBC AUTO: 89 FL (ref 78–100)
MONOCYTES # BLD AUTO: 0.2 10E3/UL (ref 0–1.3)
MONOCYTES NFR BLD AUTO: 2 %
NEUTROPHILS # BLD AUTO: 8.4 10E3/UL (ref 1.6–8.3)
NEUTROPHILS NFR BLD AUTO: 90 %
NRBC # BLD AUTO: 0 10E3/UL
NRBC BLD AUTO-RTO: 0 /100
PLATELET # BLD AUTO: 304 10E3/UL (ref 150–450)
POTASSIUM SERPL-SCNC: 4.1 MMOL/L (ref 3.4–5.3)
PROT SERPL-MCNC: 8.1 G/DL (ref 6.4–8.3)
RBC # BLD AUTO: 5.32 10E6/UL (ref 4.4–5.9)
SODIUM SERPL-SCNC: 135 MMOL/L (ref 136–145)
TROPONIN T SERPL HS-MCNC: <6 NG/L
WBC # BLD AUTO: 9.3 10E3/UL (ref 4–11)

## 2023-04-10 PROCEDURE — 93005 ELECTROCARDIOGRAM TRACING: CPT

## 2023-04-10 PROCEDURE — 250N000013 HC RX MED GY IP 250 OP 250 PS 637: Performed by: EMERGENCY MEDICINE

## 2023-04-10 PROCEDURE — 84484 ASSAY OF TROPONIN QUANT: CPT | Performed by: EMERGENCY MEDICINE

## 2023-04-10 PROCEDURE — 96374 THER/PROPH/DIAG INJ IV PUSH: CPT | Mod: 59

## 2023-04-10 PROCEDURE — 83690 ASSAY OF LIPASE: CPT | Performed by: EMERGENCY MEDICINE

## 2023-04-10 PROCEDURE — 258N000003 HC RX IP 258 OP 636: Performed by: EMERGENCY MEDICINE

## 2023-04-10 PROCEDURE — 85379 FIBRIN DEGRADATION QUANT: CPT | Performed by: EMERGENCY MEDICINE

## 2023-04-10 PROCEDURE — 96361 HYDRATE IV INFUSION ADD-ON: CPT

## 2023-04-10 PROCEDURE — 99285 EMERGENCY DEPT VISIT HI MDM: CPT | Mod: 25

## 2023-04-10 PROCEDURE — 80053 COMPREHEN METABOLIC PANEL: CPT | Performed by: EMERGENCY MEDICINE

## 2023-04-10 PROCEDURE — 83735 ASSAY OF MAGNESIUM: CPT | Performed by: EMERGENCY MEDICINE

## 2023-04-10 PROCEDURE — 250N000011 HC RX IP 250 OP 636: Performed by: EMERGENCY MEDICINE

## 2023-04-10 PROCEDURE — 71275 CT ANGIOGRAPHY CHEST: CPT

## 2023-04-10 PROCEDURE — 82077 ASSAY SPEC XCP UR&BREATH IA: CPT | Performed by: EMERGENCY MEDICINE

## 2023-04-10 PROCEDURE — 250N000009 HC RX 250: Performed by: EMERGENCY MEDICINE

## 2023-04-10 PROCEDURE — 85025 COMPLETE CBC W/AUTO DIFF WBC: CPT | Performed by: EMERGENCY MEDICINE

## 2023-04-10 PROCEDURE — 36415 COLL VENOUS BLD VENIPUNCTURE: CPT | Performed by: EMERGENCY MEDICINE

## 2023-04-10 RX ORDER — GABAPENTIN 300 MG/1
300 CAPSULE ORAL ONCE
Status: COMPLETED | OUTPATIENT
Start: 2023-04-10 | End: 2023-04-10

## 2023-04-10 RX ORDER — LORAZEPAM 2 MG/ML
1 INJECTION INTRAMUSCULAR ONCE
Status: COMPLETED | OUTPATIENT
Start: 2023-04-10 | End: 2023-04-10

## 2023-04-10 RX ORDER — GABAPENTIN 300 MG/1
300 CAPSULE ORAL 3 TIMES DAILY
Qty: 15 CAPSULE | Refills: 0 | Status: ON HOLD | OUTPATIENT
Start: 2023-04-10 | End: 2023-07-05

## 2023-04-10 RX ORDER — SODIUM CHLORIDE 9 MG/ML
INJECTION, SOLUTION INTRAVENOUS CONTINUOUS
Status: DISCONTINUED | OUTPATIENT
Start: 2023-04-10 | End: 2023-04-10 | Stop reason: HOSPADM

## 2023-04-10 RX ORDER — LORAZEPAM 0.5 MG/1
1 TABLET ORAL EVERY 6 HOURS PRN
Qty: 5 TABLET | Refills: 0 | Status: ON HOLD | OUTPATIENT
Start: 2023-04-10 | End: 2023-07-05

## 2023-04-10 RX ORDER — IOPAMIDOL 755 MG/ML
66 INJECTION, SOLUTION INTRAVASCULAR ONCE
Status: COMPLETED | OUTPATIENT
Start: 2023-04-10 | End: 2023-04-10

## 2023-04-10 RX ADMIN — IOPAMIDOL 66 ML: 755 INJECTION, SOLUTION INTRAVENOUS at 19:32

## 2023-04-10 RX ADMIN — LORAZEPAM 1 MG: 2 INJECTION INTRAMUSCULAR at 20:33

## 2023-04-10 RX ADMIN — GABAPENTIN 300 MG: 300 CAPSULE ORAL at 18:07

## 2023-04-10 RX ADMIN — SODIUM CHLORIDE 1000 ML: 9 INJECTION, SOLUTION INTRAVENOUS at 18:01

## 2023-04-10 RX ADMIN — GABAPENTIN 300 MG: 300 CAPSULE ORAL at 20:33

## 2023-04-10 RX ADMIN — SODIUM CHLORIDE 91 ML: 9 INJECTION, SOLUTION INTRAVENOUS at 19:32

## 2023-04-10 ASSESSMENT — ENCOUNTER SYMPTOMS
ABDOMINAL PAIN: 0
DIARRHEA: 0
DIAPHORESIS: 1
SHORTNESS OF BREATH: 1
TREMORS: 1
VOMITING: 0
NAUSEA: 0

## 2023-04-10 ASSESSMENT — ACTIVITIES OF DAILY LIVING (ADL): ADLS_ACUITY_SCORE: 35

## 2023-04-10 NOTE — ED TRIAGE NOTES
Pt reports CP x2 days, with increasing SOB and increased pain. Diaphoretic. Pain on L side, radiating to L arm. N/t digits on L hand. Pt reports binge drinking beer last week. Drank 7 days straight and last drink was 3 days. Pt is a self reported alcoholic, reports drinking beer most days. Pt and spouse both state pt has gone to treatment several times over the years and deny seizures with withdrawals. Pt reports starting prednisone today for a rash. Pt reports it was prescribed last week, but didn't start d/t etoh  use.

## 2023-04-10 NOTE — ED PROVIDER NOTES
"PIT/Triage Evaluation    Patient presented with chest pain and alcohol withdrawal. Patient reports he drinks \"often\", but has not had a drink since Friday.  He admits to drinking quite heavily last week.  On Saturday, he notes experiencing chest pain, tingling in his left arm, as well as shortness of breath and generalized clamminess. These symptoms resolved on their own after 6-8 hours, but re-onset today at around 1630.  He denies nausea, vomiting, abdominal pain, and diarrhea. Patient smokes occasionally, but denies any drug use. No prior history of withdrawal.     Exam is notable for:  General:  Alert, interactive, tremulous.  Cardiovascular:  Well perfused, tachycardic.  Lungs:  No respiratory distress, no accessory muscle use  Neuro:  Moving all 4 extremities, very tremulous.  Skin:  Warm, dry  Psych:  Normal affect    Appropriate interventions for symptom management were initiated if applicable.  Appropriate diagnostic tests were initiated if indicated.    Important information for subsequent clinician:  Patient had an EKG obtained as well as blood work, and he was provided IV fluids and oral gabapentin, as he was in the triage area.  He does not appear to have an acute injury pattern on his EKG, but he does appear to be in alcohol withdrawal.    I briefly evaluated the patient and developed an initial plan of care. I discussed this plan and explained that this brief interaction does not constitute a full evaluation. Patient/family understands that they should wait to be fully evaluated and discuss any test results with another clinician prior to leaving the hospital.     Carlos Jackson MD  04/10/23 1943    "

## 2023-04-11 LAB
ATRIAL RATE - MUSE: 128 BPM
DIASTOLIC BLOOD PRESSURE - MUSE: NORMAL MMHG
INTERPRETATION ECG - MUSE: NORMAL
P AXIS - MUSE: 64 DEGREES
PR INTERVAL - MUSE: 152 MS
QRS DURATION - MUSE: 74 MS
QT - MUSE: 290 MS
QTC - MUSE: 423 MS
R AXIS - MUSE: 67 DEGREES
SYSTOLIC BLOOD PRESSURE - MUSE: NORMAL MMHG
T AXIS - MUSE: 73 DEGREES
VENTRICULAR RATE- MUSE: 128 BPM

## 2023-04-11 NOTE — ED PROVIDER NOTES
"  History     Chief Complaint:  Chest Pain and Withdrawal       The history is provided by the patient.      Dariusz Ferreira is a 42 year old male with a history of hypertension and alcohol use disorder who presents with chest pain and alcohol withdrawal. He reports he drinks \"often\", but has not had a drink since Friday.  He admits to drinking quite heavily last week.  On Saturday, he notes experiencing chest pain, tingling in his left arm, as well as shortness of breath and generalized clamminess. These symptoms resolved on their own after 6-8 hours, but re-onset today at around 1630.  He denies nausea, vomiting, abdominal pain, and diarrhea. Patient smokes occasionally, but denies any drug use. No prior history of withdrawal.     Independent Historian:   None - Patient Only    Review of External Notes: None     ROS:  Review of Systems   Constitutional: Positive for diaphoresis.   Respiratory: Positive for shortness of breath.    Cardiovascular: Positive for chest pain.   Gastrointestinal: Negative for abdominal pain, diarrhea, nausea and vomiting.   Neurological: Positive for tremors.        + tingling   All other systems reviewed and are negative.    Allergies:  Penicillins  Erythromycin     Medications:    buPROPion  fluvoxaMINE  hydrOXYzine   lamoTRIgine   tadalafil    Past Medical History:    ADHD  Anxiety  Depression  Hypertension  Asthma  Alcohol use disorder    Past Surgical History:    Mucous cyst removal  Tonsillectomy      Family History:    family history includes Alcohol/Drug in his father, mother, and sister; Blood Disease in his mother; Cerebrovascular Disease in his maternal grandfather and mother; Depression in his mother; Hypertension in his mother; Lung Cancer in his mother; Respiratory in his mother; Thyroid Disease in his sister.    Social History:  Presents with male   PCP: [facility] [place]     Physical Exam     Patient Vitals for the past 24 hrs:   BP Temp Temp src Pulse Resp " SpO2   04/10/23 2015 (!) 144/93 -- -- 112 27 95 %   04/10/23 2010 (!) 139/98 -- -- -- -- --   04/10/23 1744 (!) 164/105 97.1  F (36.2  C) Temporal (!) 137 22 96 %        Physical Exam  Nursing note and vitals reviewed.  Constitutional:  Oriented to person, place, and time. Cooperative.   HENT:   Nose:    Nose normal.   Mouth/Throat:   Mucous membranes are normal.   Eyes:    Conjunctivae normal and EOM are normal.      Pupils are equal, round, and reactive to light.   Neck:    Trachea normal.   Cardiovascular:  Tachycardic rate, regular rhythm, normal heart sounds and normal pulses. No murmur heard.  Pulmonary/Chest:  Effort normal and breath sounds normal.   Abdominal:   Soft. Normal appearance and bowel sounds are normal.      There is no tenderness.      There is no rebound and no CVA tenderness.   Musculoskeletal:  Extremities atraumatic x 4.   Lymphadenopathy:  No cervical adenopathy.   Neurological:   Tremulous.  Alert and oriented to person, place, and time. Normal strength.      No cranial nerve deficit or sensory deficit. GCS eye subscore is 4. GCS verbal subscore is 5. GCS motor subscore is 6.   Skin:    Skin is intact. No rash noted.   Psychiatric:   Normal mood and affect.    Emergency Department Course   ECG  ECG taken at 1740, ECG read at 1750  Sinus Tachycardia  Biatrial enlargement   Nonspecific ST abnormality   Rate 128 bpm. DE interval 152 ms. QRS duration 74 ms. QT/QTc 290/423 ms. P-R-T axes 64 67 73.     Imaging:  CT Chest Pulmonary Embolism w Contrast   Final Result   IMPRESSION:   1.  No evidence of pulmonary embolism.   2.  Few scattered pulmonary nodules including 3 mm left upper lobe nodule, as per Fleischner Society criteria, for low-risk patient, no routine follow-up is recommended and for high-risk patient, optional chest CT in 12 months can be considered.   3.  Hepatic steatosis.         Report per radiology    Laboratory:  Labs Ordered and Resulted from Time of ED Arrival to Time of ED  Departure   D DIMER QUANTITATIVE - Abnormal       Result Value    D-Dimer Quantitative 0.62 (*)    COMPREHENSIVE METABOLIC PANEL - Abnormal    Sodium 135 (*)     Potassium 4.1      Chloride 98      Carbon Dioxide (CO2) 23      Anion Gap 14      Urea Nitrogen 10.0      Creatinine 0.92      Calcium 10.2 (*)     Glucose 146 (*)     Alkaline Phosphatase 99      AST 59 (*)     ALT 73 (*)     Protein Total 8.1      Albumin 4.9      Bilirubin Total 0.6      GFR Estimate >90     LIPASE - Abnormal    Lipase 80 (*)    CBC WITH PLATELETS AND DIFFERENTIAL - Abnormal    WBC Count 9.3      RBC Count 5.32      Hemoglobin 15.6      Hematocrit 47.4      MCV 89      MCH 29.3      MCHC 32.9      RDW 12.6      Platelet Count 304      % Neutrophils 90      % Lymphocytes 8      % Monocytes 2      % Eosinophils 0      % Basophils 0      % Immature Granulocytes 0      NRBCs per 100 WBC 0      Absolute Neutrophils 8.4 (*)     Absolute Lymphocytes 0.8      Absolute Monocytes 0.2      Absolute Eosinophils 0.0      Absolute Basophils 0.0      Absolute Immature Granulocytes 0.0      Absolute NRBCs 0.0     TROPONIN T, HIGH SENSITIVITY - Normal    Troponin T, High Sensitivity <6     MAGNESIUM - Normal    Magnesium 2.3     ETHYL ALCOHOL LEVEL - Normal    Alcohol ethyl <0.01        Emergency Department Course & Assessments:    Interventions:  Medications   0.9% sodium chloride BOLUS (1,000 mLs Intravenous $New Bag 4/10/23 1801)     Followed by   sodium chloride 0.9% infusion (has no administration in time range)   gabapentin (NEURONTIN) capsule 300 mg (300 mg Oral $Given 4/10/23 1807)   iopamidol (ISOVUE-370) solution 66 mL (66 mLs Intravenous $Given 4/10/23 1932)   Saline Flush (91 mLs Intravenous $Given 4/10/23 1932)   LORazepam (ATIVAN) injection 1 mg (1 mg Intravenous $Given 4/10/23 2033)   gabapentin (NEURONTIN) capsule 300 mg (300 mg Oral $Given 4/10/23 2033)      Independent Interpretation (X-rays, CTs, rhythm  strip):  None    Assessments/Consultations/Discussion of Management or Tests:  ED Course as of 04/10/23 2020   Mon Apr 10, 2023   2019 Rechecked and updated.        Social Determinants of Health affecting care:   None    Disposition:  The patient was discharged to home.     Impression & Plan      CMS Diagnoses: None    Medical Decision Making:  This is a 42-year-old male who came in for further evaluation of chest pain.  His EKG does not show any acute ischemic changes, although he was tachycardic upon arrival here.  It seems very clear that his presentation is consistent with alcohol withdrawal.  I also wanted to check the above blood work to rule out any other abnormalities as well as to look for cardiac ischemia.  I was concerned that he might have an electrolyte disturbance as well as a pulmonary embolism.  He was initially provided IV fluids and oral gabapentin, as he was initially seen and evaluated in the triage area.  He had a CT scan of his chest obtained as well after his D-dimer came back elevated to further rule out a pulmonary embolism, and that was negative for any PE.  He was subsequently brought back to the main ER room where I saw him again.  He does have some small pulmonary nodules on his CT scan, which I informed him of and the need for outpatient follow-up.  I indicated that his work-up is unremarkable but that I was quite concerned about his alcohol withdrawal.  He did not want to stay any longer, but I did convince him to stay for at least another oral dose of gabapentin and an IV dose of Ativan.  This improved his symptoms quite a bit, but he was still tachycardic and tremulous.  I indicated that alcohol withdrawal can be quite dangerous, but he still wanted to go home.  I agreed to send him home with a small prescription for Ativan as well as gabapentin.  I recommended he return with any concerns or worsening symptoms and to follow-up with his physician as soon as  possible.        Diagnosis:    ICD-10-CM    1. Chest pain, unspecified type  R07.9       2. Alcohol withdrawal, uncomplicated (H)  F10.930       3. Pulmonary nodules  R91.8            Discharge Medications:  New Prescriptions    GABAPENTIN (NEURONTIN) 300 MG CAPSULE    Take 1 capsule (300 mg) by mouth 3 times daily for 5 days    LORAZEPAM (ATIVAN) 0.5 MG TABLET    Take 2 tablets (1 mg) by mouth every 6 hours as needed for withdrawal      Scribe Disclosure:  I, WATSON CARBALLO, am serving as a scribe at 8:19 PM on 4/10/2023 to document services personally performed by Carlos Jackson MD based on my observations and the provider's statements to me.   4/10/2023   Carlos Jackson MD Lashkowitz, Seth H, MD  04/10/23 0690

## 2023-07-03 PROCEDURE — 82077 ASSAY SPEC XCP UR&BREATH IA: CPT | Performed by: EMERGENCY MEDICINE

## 2023-07-03 PROCEDURE — 36415 COLL VENOUS BLD VENIPUNCTURE: CPT | Performed by: EMERGENCY MEDICINE

## 2023-07-03 PROCEDURE — 93005 ELECTROCARDIOGRAM TRACING: CPT

## 2023-07-03 PROCEDURE — 85025 COMPLETE CBC W/AUTO DIFF WBC: CPT | Performed by: EMERGENCY MEDICINE

## 2023-07-03 PROCEDURE — 83690 ASSAY OF LIPASE: CPT | Performed by: EMERGENCY MEDICINE

## 2023-07-03 PROCEDURE — 99285 EMERGENCY DEPT VISIT HI MDM: CPT

## 2023-07-03 RX ORDER — ONDANSETRON 2 MG/ML
4 INJECTION INTRAMUSCULAR; INTRAVENOUS ONCE
Status: COMPLETED | OUTPATIENT
Start: 2023-07-04 | End: 2023-07-04

## 2023-07-04 ENCOUNTER — APPOINTMENT (OUTPATIENT)
Dept: CT IMAGING | Facility: CLINIC | Age: 43
DRG: 896 | End: 2023-07-04
Attending: EMERGENCY MEDICINE
Payer: COMMERCIAL

## 2023-07-04 ENCOUNTER — APPOINTMENT (OUTPATIENT)
Dept: CT IMAGING | Facility: CLINIC | Age: 43
DRG: 896 | End: 2023-07-04
Attending: HOSPITALIST
Payer: COMMERCIAL

## 2023-07-04 ENCOUNTER — HOSPITAL ENCOUNTER (INPATIENT)
Facility: CLINIC | Age: 43
LOS: 10 days | Discharge: HOME | DRG: 896 | End: 2023-07-14
Attending: EMERGENCY MEDICINE | Admitting: INTERNAL MEDICINE
Payer: COMMERCIAL

## 2023-07-04 DIAGNOSIS — F10.21 ALCOHOL USE DISORDER, SEVERE, IN SUSTAINED REMISSION (H): ICD-10-CM

## 2023-07-04 DIAGNOSIS — F33.9 EPISODE OF RECURRENT MAJOR DEPRESSIVE DISORDER, UNSPECIFIED DEPRESSION EPISODE SEVERITY (H): Primary | ICD-10-CM

## 2023-07-04 DIAGNOSIS — F10.21 ALCOHOL USE DISORDER, SEVERE, IN EARLY REMISSION (H): ICD-10-CM

## 2023-07-04 DIAGNOSIS — F10.930 ALCOHOL WITHDRAWAL, UNCOMPLICATED (H): ICD-10-CM

## 2023-07-04 DIAGNOSIS — K85.20 ALCOHOL-INDUCED ACUTE PANCREATITIS, UNSPECIFIED COMPLICATION STATUS: ICD-10-CM

## 2023-07-04 LAB
ALBUMIN SERPL BCG-MCNC: 3.6 G/DL (ref 3.5–5.2)
ALBUMIN SERPL BCG-MCNC: 3.8 G/DL (ref 3.5–5.2)
ALLEN'S TEST: YES
ALP SERPL-CCNC: 70 U/L (ref 40–129)
ALP SERPL-CCNC: 79 U/L (ref 40–129)
ALT SERPL W P-5'-P-CCNC: 109 U/L (ref 0–70)
ALT SERPL W P-5'-P-CCNC: 85 U/L (ref 0–70)
ANION GAP SERPL CALCULATED.3IONS-SCNC: 14 MMOL/L (ref 7–15)
ANION GAP SERPL CALCULATED.3IONS-SCNC: 17 MMOL/L (ref 7–15)
AST SERPL W P-5'-P-CCNC: 100 U/L (ref 0–45)
AST SERPL W P-5'-P-CCNC: 71 U/L (ref 0–45)
ATRIAL RATE - MUSE: 134 BPM
ATRIAL RATE - MUSE: 137 BPM
BASE EXCESS BLDA CALC-SCNC: -1.2 MMOL/L (ref -9–1.8)
BASOPHILS # BLD AUTO: 0 10E3/UL (ref 0–0.2)
BASOPHILS NFR BLD AUTO: 0 %
BILIRUB SERPL-MCNC: 1.1 MG/DL
BILIRUB SERPL-MCNC: 1.5 MG/DL
BUN SERPL-MCNC: 12.8 MG/DL (ref 6–20)
BUN SERPL-MCNC: 9.9 MG/DL (ref 6–20)
CALCIUM SERPL-MCNC: 7.5 MG/DL (ref 8.6–10)
CALCIUM SERPL-MCNC: 8.3 MG/DL (ref 8.6–10)
CHLORIDE SERPL-SCNC: 103 MMOL/L (ref 98–107)
CHLORIDE SERPL-SCNC: 93 MMOL/L (ref 98–107)
CREAT SERPL-MCNC: 0.83 MG/DL (ref 0.67–1.17)
CREAT SERPL-MCNC: 0.95 MG/DL (ref 0.67–1.17)
DEPRECATED HCO3 PLAS-SCNC: 19 MMOL/L (ref 22–29)
DEPRECATED HCO3 PLAS-SCNC: 22 MMOL/L (ref 22–29)
DIASTOLIC BLOOD PRESSURE - MUSE: NORMAL MMHG
DIASTOLIC BLOOD PRESSURE - MUSE: NORMAL MMHG
EOSINOPHIL # BLD AUTO: 0 10E3/UL (ref 0–0.7)
EOSINOPHIL NFR BLD AUTO: 0 %
ERYTHROCYTE [DISTWIDTH] IN BLOOD BY AUTOMATED COUNT: 11.7 % (ref 10–15)
ERYTHROCYTE [DISTWIDTH] IN BLOOD BY AUTOMATED COUNT: 12.1 % (ref 10–15)
ETHANOL SERPL-MCNC: <0.01 G/DL
GFR SERPL CREATININE-BSD FRML MDRD: >90 ML/MIN/1.73M2
GFR SERPL CREATININE-BSD FRML MDRD: >90 ML/MIN/1.73M2
GLUCOSE BLDC GLUCOMTR-MCNC: 100 MG/DL (ref 70–99)
GLUCOSE BLDC GLUCOMTR-MCNC: 109 MG/DL (ref 70–99)
GLUCOSE BLDC GLUCOMTR-MCNC: 124 MG/DL (ref 70–99)
GLUCOSE BLDC GLUCOMTR-MCNC: 127 MG/DL (ref 70–99)
GLUCOSE BLDC GLUCOMTR-MCNC: 145 MG/DL (ref 70–99)
GLUCOSE SERPL-MCNC: 133 MG/DL (ref 70–99)
GLUCOSE SERPL-MCNC: 148 MG/DL (ref 70–99)
HCO3 BLD-SCNC: 23 MMOL/L (ref 21–28)
HCT VFR BLD AUTO: 45.8 % (ref 40–53)
HCT VFR BLD AUTO: 53.2 % (ref 40–53)
HGB BLD-MCNC: 15.8 G/DL (ref 13.3–17.7)
HGB BLD-MCNC: 19.1 G/DL (ref 13.3–17.7)
HOLD SPECIMEN: NORMAL
HOLD SPECIMEN: NORMAL
IMM GRANULOCYTES # BLD: 0.1 10E3/UL
IMM GRANULOCYTES NFR BLD: 0 %
INTERPRETATION ECG - MUSE: NORMAL
INTERPRETATION ECG - MUSE: NORMAL
LIPASE SERPL-CCNC: 1083 U/L (ref 13–60)
LYMPHOCYTES # BLD AUTO: 0.5 10E3/UL (ref 0.8–5.3)
LYMPHOCYTES NFR BLD AUTO: 3 %
MAGNESIUM SERPL-MCNC: 2 MG/DL (ref 1.7–2.3)
MAGNESIUM SERPL-MCNC: 2.1 MG/DL (ref 1.7–2.3)
MCH RBC QN AUTO: 29.2 PG (ref 26.5–33)
MCH RBC QN AUTO: 29.6 PG (ref 26.5–33)
MCHC RBC AUTO-ENTMCNC: 34.5 G/DL (ref 31.5–36.5)
MCHC RBC AUTO-ENTMCNC: 35.9 G/DL (ref 31.5–36.5)
MCV RBC AUTO: 81 FL (ref 78–100)
MCV RBC AUTO: 86 FL (ref 78–100)
MONOCYTES # BLD AUTO: 0.6 10E3/UL (ref 0–1.3)
MONOCYTES NFR BLD AUTO: 4 %
NEUTROPHILS # BLD AUTO: 15.3 10E3/UL (ref 1.6–8.3)
NEUTROPHILS NFR BLD AUTO: 93 %
NRBC # BLD AUTO: 0 10E3/UL
NRBC BLD AUTO-RTO: 0 /100
O2/TOTAL GAS SETTING VFR VENT: 40 %
P AXIS - MUSE: 47 DEGREES
P AXIS - MUSE: 61 DEGREES
PCO2 BLD: 38 MM HG (ref 35–45)
PH BLD: 7.4 [PH] (ref 7.35–7.45)
PHOSPHATE SERPL-MCNC: 1.5 MG/DL (ref 2.5–4.5)
PLATELET # BLD AUTO: 272 10E3/UL (ref 150–450)
PLATELET # BLD AUTO: 385 10E3/UL (ref 150–450)
PO2 BLD: 67 MM HG (ref 80–105)
POTASSIUM SERPL-SCNC: 3.3 MMOL/L (ref 3.4–5.3)
POTASSIUM SERPL-SCNC: 3.5 MMOL/L (ref 3.4–5.3)
PR INTERVAL - MUSE: 142 MS
PR INTERVAL - MUSE: 150 MS
PROT SERPL-MCNC: 6.1 G/DL (ref 6.4–8.3)
PROT SERPL-MCNC: 6.5 G/DL (ref 6.4–8.3)
QRS DURATION - MUSE: 72 MS
QRS DURATION - MUSE: 78 MS
QT - MUSE: 278 MS
QT - MUSE: 286 MS
QTC - MUSE: 419 MS
QTC - MUSE: 427 MS
R AXIS - MUSE: 6 DEGREES
R AXIS - MUSE: 63 DEGREES
RBC # BLD AUTO: 5.33 10E6/UL (ref 4.4–5.9)
RBC # BLD AUTO: 6.54 10E6/UL (ref 4.4–5.9)
SODIUM SERPL-SCNC: 132 MMOL/L (ref 136–145)
SODIUM SERPL-SCNC: 136 MMOL/L (ref 136–145)
SYSTOLIC BLOOD PRESSURE - MUSE: NORMAL MMHG
SYSTOLIC BLOOD PRESSURE - MUSE: NORMAL MMHG
T AXIS - MUSE: 77 DEGREES
T AXIS - MUSE: 82 DEGREES
VENTRICULAR RATE- MUSE: 134 BPM
VENTRICULAR RATE- MUSE: 137 BPM
WBC # BLD AUTO: 16.5 10E3/UL (ref 4–11)
WBC # BLD AUTO: 18.7 10E3/UL (ref 4–11)

## 2023-07-04 PROCEDURE — HZ2ZZZZ DETOXIFICATION SERVICES FOR SUBSTANCE ABUSE TREATMENT: ICD-10-PCS | Performed by: INTERNAL MEDICINE

## 2023-07-04 PROCEDURE — 200N000001 HC R&B ICU

## 2023-07-04 PROCEDURE — 83735 ASSAY OF MAGNESIUM: CPT

## 2023-07-04 PROCEDURE — 82803 BLOOD GASES ANY COMBINATION: CPT | Performed by: PHYSICIAN ASSISTANT

## 2023-07-04 PROCEDURE — 250N000011 HC RX IP 250 OP 636: Performed by: PHYSICIAN ASSISTANT

## 2023-07-04 PROCEDURE — 250N000009 HC RX 250: Performed by: INTERNAL MEDICINE

## 2023-07-04 PROCEDURE — 84155 ASSAY OF PROTEIN SERUM: CPT

## 2023-07-04 PROCEDURE — 82962 GLUCOSE BLOOD TEST: CPT

## 2023-07-04 PROCEDURE — 258N000003 HC RX IP 258 OP 636: Performed by: INTERNAL MEDICINE

## 2023-07-04 PROCEDURE — 99223 1ST HOSP IP/OBS HIGH 75: CPT | Mod: AI | Performed by: INTERNAL MEDICINE

## 2023-07-04 PROCEDURE — 250N000009 HC RX 250: Performed by: HOSPITALIST

## 2023-07-04 PROCEDURE — 250N000011 HC RX IP 250 OP 636: Performed by: INTERNAL MEDICINE

## 2023-07-04 PROCEDURE — 250N000011 HC RX IP 250 OP 636: Performed by: EMERGENCY MEDICINE

## 2023-07-04 PROCEDURE — 250N000011 HC RX IP 250 OP 636: Mod: JZ | Performed by: INTERNAL MEDICINE

## 2023-07-04 PROCEDURE — 36415 COLL VENOUS BLD VENIPUNCTURE: CPT

## 2023-07-04 PROCEDURE — 83735 ASSAY OF MAGNESIUM: CPT | Performed by: INTERNAL MEDICINE

## 2023-07-04 PROCEDURE — 250N000009 HC RX 250: Performed by: EMERGENCY MEDICINE

## 2023-07-04 PROCEDURE — 250N000011 HC RX IP 250 OP 636: Mod: JZ | Performed by: EMERGENCY MEDICINE

## 2023-07-04 PROCEDURE — 74177 CT ABD & PELVIS W/CONTRAST: CPT

## 2023-07-04 PROCEDURE — 80053 COMPREHEN METABOLIC PANEL: CPT | Performed by: EMERGENCY MEDICINE

## 2023-07-04 PROCEDURE — 258N000003 HC RX IP 258 OP 636: Performed by: EMERGENCY MEDICINE

## 2023-07-04 PROCEDURE — 85027 COMPLETE CBC AUTOMATED: CPT

## 2023-07-04 PROCEDURE — C9113 INJ PANTOPRAZOLE SODIUM, VIA: HCPCS | Mod: JZ | Performed by: INTERNAL MEDICINE

## 2023-07-04 PROCEDURE — 99291 CRITICAL CARE FIRST HOUR: CPT | Mod: 25

## 2023-07-04 PROCEDURE — 84100 ASSAY OF PHOSPHORUS: CPT | Performed by: INTERNAL MEDICINE

## 2023-07-04 PROCEDURE — 250N000011 HC RX IP 250 OP 636: Performed by: HOSPITALIST

## 2023-07-04 PROCEDURE — 96360 HYDRATION IV INFUSION INIT: CPT

## 2023-07-04 PROCEDURE — 99207 PR NO CHARGE LOS: CPT | Performed by: HOSPITALIST

## 2023-07-04 PROCEDURE — C9113 INJ PANTOPRAZOLE SODIUM, VIA: HCPCS | Mod: JZ | Performed by: EMERGENCY MEDICINE

## 2023-07-04 PROCEDURE — 36415 COLL VENOUS BLD VENIPUNCTURE: CPT | Performed by: EMERGENCY MEDICINE

## 2023-07-04 PROCEDURE — 70450 CT HEAD/BRAIN W/O DYE: CPT

## 2023-07-04 PROCEDURE — 82247 BILIRUBIN TOTAL: CPT

## 2023-07-04 RX ORDER — NALOXONE HYDROCHLORIDE 0.4 MG/ML
0.2 INJECTION, SOLUTION INTRAMUSCULAR; INTRAVENOUS; SUBCUTANEOUS
Status: DISCONTINUED | OUTPATIENT
Start: 2023-07-04 | End: 2023-07-14 | Stop reason: HOSPADM

## 2023-07-04 RX ORDER — ONDANSETRON 2 MG/ML
4 INJECTION INTRAMUSCULAR; INTRAVENOUS EVERY 30 MIN PRN
Status: DISCONTINUED | OUTPATIENT
Start: 2023-07-04 | End: 2023-07-05

## 2023-07-04 RX ORDER — DEXTROSE MONOHYDRATE 25 G/50ML
25-50 INJECTION, SOLUTION INTRAVENOUS
Status: DISCONTINUED | OUTPATIENT
Start: 2023-07-04 | End: 2023-07-13

## 2023-07-04 RX ORDER — ONDANSETRON 4 MG/1
4 TABLET, ORALLY DISINTEGRATING ORAL EVERY 6 HOURS PRN
Status: DISCONTINUED | OUTPATIENT
Start: 2023-07-04 | End: 2023-07-14 | Stop reason: HOSPADM

## 2023-07-04 RX ORDER — FINASTERIDE 1 MG/1
1 TABLET, FILM COATED ORAL DAILY
Status: ON HOLD | COMMUNITY
End: 2023-07-14

## 2023-07-04 RX ORDER — IOPAMIDOL 755 MG/ML
88 INJECTION, SOLUTION INTRAVASCULAR ONCE
Status: COMPLETED | OUTPATIENT
Start: 2023-07-04 | End: 2023-07-04

## 2023-07-04 RX ORDER — NALOXONE HYDROCHLORIDE 0.4 MG/ML
0.4 INJECTION, SOLUTION INTRAMUSCULAR; INTRAVENOUS; SUBCUTANEOUS
Status: DISCONTINUED | OUTPATIENT
Start: 2023-07-04 | End: 2023-07-14 | Stop reason: HOSPADM

## 2023-07-04 RX ORDER — FOLIC ACID 5 MG/ML
1 INJECTION, SOLUTION INTRAMUSCULAR; INTRAVENOUS; SUBCUTANEOUS DAILY
Status: COMPLETED | OUTPATIENT
Start: 2023-07-04 | End: 2023-07-08

## 2023-07-04 RX ORDER — LORAZEPAM 0.5 MG/1
1-2 TABLET ORAL EVERY 30 MIN PRN
Status: DISCONTINUED | OUTPATIENT
Start: 2023-07-04 | End: 2023-07-06

## 2023-07-04 RX ORDER — LORAZEPAM 2 MG/ML
1-2 INJECTION INTRAMUSCULAR EVERY 30 MIN PRN
Status: DISCONTINUED | OUTPATIENT
Start: 2023-07-04 | End: 2023-07-06

## 2023-07-04 RX ORDER — AMOXICILLIN 250 MG
2 CAPSULE ORAL 2 TIMES DAILY PRN
Status: DISCONTINUED | OUTPATIENT
Start: 2023-07-04 | End: 2023-07-14 | Stop reason: HOSPADM

## 2023-07-04 RX ORDER — HYDROMORPHONE HYDROCHLORIDE 1 MG/ML
0.5 INJECTION, SOLUTION INTRAMUSCULAR; INTRAVENOUS; SUBCUTANEOUS EVERY 30 MIN PRN
Status: COMPLETED | OUTPATIENT
Start: 2023-07-04 | End: 2023-07-04

## 2023-07-04 RX ORDER — OLANZAPINE 5 MG/1
5-10 TABLET, ORALLY DISINTEGRATING ORAL EVERY 6 HOURS PRN
Status: DISCONTINUED | OUTPATIENT
Start: 2023-07-04 | End: 2023-07-14 | Stop reason: HOSPADM

## 2023-07-04 RX ORDER — PROCHLORPERAZINE 25 MG
25 SUPPOSITORY, RECTAL RECTAL EVERY 12 HOURS PRN
Status: DISCONTINUED | OUTPATIENT
Start: 2023-07-04 | End: 2023-07-14 | Stop reason: HOSPADM

## 2023-07-04 RX ORDER — DIAZEPAM 10 MG/2ML
5 INJECTION, SOLUTION INTRAMUSCULAR; INTRAVENOUS ONCE
Status: COMPLETED | OUTPATIENT
Start: 2023-07-04 | End: 2023-07-04

## 2023-07-04 RX ORDER — HALOPERIDOL 5 MG/ML
2.5-5 INJECTION INTRAMUSCULAR EVERY 6 HOURS PRN
Status: DISCONTINUED | OUTPATIENT
Start: 2023-07-04 | End: 2023-07-14 | Stop reason: HOSPADM

## 2023-07-04 RX ORDER — HYDROMORPHONE HYDROCHLORIDE 1 MG/ML
0.5 INJECTION, SOLUTION INTRAMUSCULAR; INTRAVENOUS; SUBCUTANEOUS EVERY 4 HOURS PRN
Status: COMPLETED | OUTPATIENT
Start: 2023-07-04 | End: 2023-07-05

## 2023-07-04 RX ORDER — FLUMAZENIL 0.1 MG/ML
0.2 INJECTION, SOLUTION INTRAVENOUS
Status: DISCONTINUED | OUTPATIENT
Start: 2023-07-04 | End: 2023-07-12

## 2023-07-04 RX ORDER — AMOXICILLIN 250 MG
1 CAPSULE ORAL 2 TIMES DAILY PRN
Status: DISCONTINUED | OUTPATIENT
Start: 2023-07-04 | End: 2023-07-14 | Stop reason: HOSPADM

## 2023-07-04 RX ORDER — THIAMINE HYDROCHLORIDE 100 MG/ML
100 INJECTION, SOLUTION INTRAMUSCULAR; INTRAVENOUS DAILY
Status: DISCONTINUED | OUTPATIENT
Start: 2023-07-04 | End: 2023-07-05

## 2023-07-04 RX ORDER — ONDANSETRON 2 MG/ML
4 INJECTION INTRAMUSCULAR; INTRAVENOUS EVERY 6 HOURS PRN
Status: DISCONTINUED | OUTPATIENT
Start: 2023-07-04 | End: 2023-07-14 | Stop reason: HOSPADM

## 2023-07-04 RX ORDER — NICOTINE POLACRILEX 4 MG
15-30 LOZENGE BUCCAL
Status: DISCONTINUED | OUTPATIENT
Start: 2023-07-04 | End: 2023-07-13

## 2023-07-04 RX ORDER — DEXMEDETOMIDINE HYDROCHLORIDE 4 UG/ML
.1-1.2 INJECTION, SOLUTION INTRAVENOUS CONTINUOUS
Status: DISCONTINUED | OUTPATIENT
Start: 2023-07-04 | End: 2023-07-13

## 2023-07-04 RX ORDER — PROCHLORPERAZINE MALEATE 10 MG
10 TABLET ORAL EVERY 6 HOURS PRN
Status: DISCONTINUED | OUTPATIENT
Start: 2023-07-04 | End: 2023-07-14 | Stop reason: HOSPADM

## 2023-07-04 RX ORDER — PHENOBARBITAL SODIUM 130 MG/ML
130 INJECTION, SOLUTION INTRAMUSCULAR; INTRAVENOUS ONCE
Status: DISCONTINUED | OUTPATIENT
Start: 2023-07-04 | End: 2023-07-04

## 2023-07-04 RX ORDER — LIDOCAINE 40 MG/G
CREAM TOPICAL
Status: DISCONTINUED | OUTPATIENT
Start: 2023-07-04 | End: 2023-07-14 | Stop reason: HOSPADM

## 2023-07-04 RX ORDER — SODIUM CHLORIDE 9 MG/ML
INJECTION, SOLUTION INTRAVENOUS CONTINUOUS
Status: DISCONTINUED | OUTPATIENT
Start: 2023-07-04 | End: 2023-07-06

## 2023-07-04 RX ORDER — PHENOBARBITAL SODIUM 130 MG/ML
130 INJECTION, SOLUTION INTRAMUSCULAR; INTRAVENOUS ONCE
Status: COMPLETED | OUTPATIENT
Start: 2023-07-04 | End: 2023-07-04

## 2023-07-04 RX ORDER — HYDROMORPHONE HYDROCHLORIDE 1 MG/ML
0.5 INJECTION, SOLUTION INTRAMUSCULAR; INTRAVENOUS; SUBCUTANEOUS ONCE
Status: COMPLETED | OUTPATIENT
Start: 2023-07-04 | End: 2023-07-04

## 2023-07-04 RX ADMIN — LORAZEPAM 2 MG: 2 INJECTION INTRAMUSCULAR; INTRAVENOUS at 05:31

## 2023-07-04 RX ADMIN — DEXMEDETOMIDINE HYDROCHLORIDE 0.6 MCG/KG/HR: 400 INJECTION INTRAVENOUS at 16:56

## 2023-07-04 RX ADMIN — LORAZEPAM 2 MG: 2 INJECTION INTRAMUSCULAR; INTRAVENOUS at 10:10

## 2023-07-04 RX ADMIN — SODIUM CHLORIDE: 9 INJECTION, SOLUTION INTRAVENOUS at 12:00

## 2023-07-04 RX ADMIN — LORAZEPAM 2 MG: 2 INJECTION INTRAMUSCULAR; INTRAVENOUS at 08:05

## 2023-07-04 RX ADMIN — LORAZEPAM 2 MG: 2 INJECTION INTRAMUSCULAR; INTRAVENOUS at 03:15

## 2023-07-04 RX ADMIN — IOPAMIDOL 88 ML: 755 INJECTION, SOLUTION INTRAVENOUS at 03:25

## 2023-07-04 RX ADMIN — LORAZEPAM 2 MG: 2 INJECTION INTRAMUSCULAR; INTRAVENOUS at 07:05

## 2023-07-04 RX ADMIN — DEXMEDETOMIDINE HYDROCHLORIDE 0.2 MCG/KG/HR: 400 INJECTION INTRAVENOUS at 09:27

## 2023-07-04 RX ADMIN — LORAZEPAM 2 MG: 2 INJECTION INTRAMUSCULAR; INTRAVENOUS at 19:31

## 2023-07-04 RX ADMIN — SODIUM CHLORIDE 1000 ML: 9 INJECTION, SOLUTION INTRAVENOUS at 00:04

## 2023-07-04 RX ADMIN — HYDROMORPHONE HYDROCHLORIDE 0.5 MG: 1 INJECTION, SOLUTION INTRAMUSCULAR; INTRAVENOUS; SUBCUTANEOUS at 19:23

## 2023-07-04 RX ADMIN — HYDROMORPHONE HYDROCHLORIDE 1 MG: 1 INJECTION, SOLUTION INTRAMUSCULAR; INTRAVENOUS; SUBCUTANEOUS at 02:38

## 2023-07-04 RX ADMIN — SODIUM CHLORIDE: 9 INJECTION, SOLUTION INTRAVENOUS at 20:19

## 2023-07-04 RX ADMIN — ONDANSETRON 4 MG: 2 INJECTION INTRAMUSCULAR; INTRAVENOUS at 00:02

## 2023-07-04 RX ADMIN — LORAZEPAM 2 MG: 2 INJECTION INTRAMUSCULAR; INTRAVENOUS at 23:35

## 2023-07-04 RX ADMIN — LORAZEPAM 2 MG: 2 INJECTION INTRAMUSCULAR; INTRAVENOUS at 07:32

## 2023-07-04 RX ADMIN — LORAZEPAM 2 MG: 2 INJECTION INTRAMUSCULAR; INTRAVENOUS at 14:07

## 2023-07-04 RX ADMIN — DEXMEDETOMIDINE HYDROCHLORIDE 1.2 MCG/KG/HR: 400 INJECTION INTRAVENOUS at 22:56

## 2023-07-04 RX ADMIN — PANTOPRAZOLE SODIUM 40 MG: 40 INJECTION, POWDER, FOR SOLUTION INTRAVENOUS at 07:52

## 2023-07-04 RX ADMIN — FOLIC ACID 1 MG: 5 INJECTION, SOLUTION INTRAMUSCULAR; INTRAVENOUS; SUBCUTANEOUS at 07:39

## 2023-07-04 RX ADMIN — LORAZEPAM 2 MG: 2 INJECTION INTRAMUSCULAR; INTRAVENOUS at 21:52

## 2023-07-04 RX ADMIN — LORAZEPAM 2 MG: 2 INJECTION INTRAMUSCULAR; INTRAVENOUS at 19:56

## 2023-07-04 RX ADMIN — PANTOPRAZOLE SODIUM 40 MG: 40 INJECTION, POWDER, FOR SOLUTION INTRAVENOUS at 01:02

## 2023-07-04 RX ADMIN — LORAZEPAM 1 MG: 2 INJECTION INTRAMUSCULAR; INTRAVENOUS at 04:21

## 2023-07-04 RX ADMIN — LORAZEPAM 2 MG: 2 INJECTION INTRAMUSCULAR; INTRAVENOUS at 08:37

## 2023-07-04 RX ADMIN — DIAZEPAM 5 MG: 5 INJECTION INTRAMUSCULAR; INTRAVENOUS at 00:46

## 2023-07-04 RX ADMIN — HYDROMORPHONE HYDROCHLORIDE 0.5 MG: 1 INJECTION, SOLUTION INTRAMUSCULAR; INTRAVENOUS; SUBCUTANEOUS at 02:20

## 2023-07-04 RX ADMIN — LORAZEPAM 2 MG: 2 INJECTION INTRAMUSCULAR; INTRAVENOUS at 16:05

## 2023-07-04 RX ADMIN — SODIUM CHLORIDE: 9 INJECTION, SOLUTION INTRAVENOUS at 16:56

## 2023-07-04 RX ADMIN — LORAZEPAM 2 MG: 2 INJECTION INTRAMUSCULAR; INTRAVENOUS at 06:28

## 2023-07-04 RX ADMIN — LORAZEPAM 2 MG: 2 INJECTION INTRAMUSCULAR; INTRAVENOUS at 21:26

## 2023-07-04 RX ADMIN — ONDANSETRON 4 MG: 2 INJECTION INTRAMUSCULAR; INTRAVENOUS at 01:04

## 2023-07-04 RX ADMIN — LORAZEPAM 1 MG: 2 INJECTION INTRAMUSCULAR; INTRAVENOUS at 21:11

## 2023-07-04 RX ADMIN — LORAZEPAM 2 MG: 2 INJECTION INTRAMUSCULAR; INTRAVENOUS at 05:50

## 2023-07-04 RX ADMIN — FAMOTIDINE 20 MG: 10 INJECTION INTRAVENOUS at 00:47

## 2023-07-04 RX ADMIN — HYDROMORPHONE HYDROCHLORIDE 0.5 MG: 1 INJECTION, SOLUTION INTRAMUSCULAR; INTRAVENOUS; SUBCUTANEOUS at 04:41

## 2023-07-04 RX ADMIN — HALOPERIDOL LACTATE 5 MG: 5 INJECTION, SOLUTION INTRAMUSCULAR at 05:22

## 2023-07-04 RX ADMIN — LORAZEPAM 2 MG: 2 INJECTION INTRAMUSCULAR; INTRAVENOUS at 22:59

## 2023-07-04 RX ADMIN — LORAZEPAM 2 MG: 2 INJECTION INTRAMUSCULAR; INTRAVENOUS at 05:06

## 2023-07-04 RX ADMIN — PHENOBARBITAL SODIUM 130 MG: 130 INJECTION INTRAMUSCULAR; INTRAVENOUS at 06:04

## 2023-07-04 RX ADMIN — HYDROMORPHONE HYDROCHLORIDE 0.5 MG: 1 INJECTION, SOLUTION INTRAMUSCULAR; INTRAVENOUS; SUBCUTANEOUS at 21:53

## 2023-07-04 RX ADMIN — PHENOBARBITAL SODIUM 260 MG: 130 INJECTION INTRAMUSCULAR; INTRAVENOUS at 01:09

## 2023-07-04 RX ADMIN — PANTOPRAZOLE SODIUM 40 MG: 40 INJECTION, POWDER, FOR SOLUTION INTRAVENOUS at 19:25

## 2023-07-04 RX ADMIN — THIAMINE HYDROCHLORIDE 100 MG: 100 INJECTION, SOLUTION INTRAMUSCULAR; INTRAVENOUS at 07:40

## 2023-07-04 RX ADMIN — ONDANSETRON 4 MG: 2 INJECTION INTRAMUSCULAR; INTRAVENOUS at 17:27

## 2023-07-04 RX ADMIN — SODIUM CHLORIDE 65 ML: 9 INJECTION, SOLUTION INTRAVENOUS at 03:25

## 2023-07-04 RX ADMIN — HYDROMORPHONE HYDROCHLORIDE 0.5 MG: 1 INJECTION, SOLUTION INTRAMUSCULAR; INTRAVENOUS; SUBCUTANEOUS at 17:26

## 2023-07-04 RX ADMIN — LORAZEPAM 2 MG: 2 INJECTION INTRAMUSCULAR; INTRAVENOUS at 20:37

## 2023-07-04 RX ADMIN — FOLIC ACID: 5 INJECTION, SOLUTION INTRAMUSCULAR; INTRAVENOUS; SUBCUTANEOUS at 01:40

## 2023-07-04 RX ADMIN — SODIUM CHLORIDE: 9 INJECTION, SOLUTION INTRAVENOUS at 04:23

## 2023-07-04 ASSESSMENT — ACTIVITIES OF DAILY LIVING (ADL)
ADLS_ACUITY_SCORE: 43
ADLS_ACUITY_SCORE: 35
ADLS_ACUITY_SCORE: 43
ADLS_ACUITY_SCORE: 43
ADLS_ACUITY_SCORE: 35
ADLS_ACUITY_SCORE: 43
ADLS_ACUITY_SCORE: 43
ADLS_ACUITY_SCORE: 35
ADLS_ACUITY_SCORE: 43
ADLS_ACUITY_SCORE: 35

## 2023-07-04 NOTE — H&P
"[facility]    History and Physical - Hospitalist Service       Date of Admission:  7/4/2023    Assessment & Plan      Dariusz Ferreira is a 42 year old male admitted on 7/4/2023. He presents with alcohol withdrawal    Alcohol withdrawal  Alcohol use disorder  Longstanding hx alcohol use disorder. Has been to treatment 5+ times in the past. States binge drinks, has been drinking heavily last 2 weeks, stopped Saturday, withdrawal started Sunday and abdominal pain today. In ED tachy 100s, hypertensive, afebrile. WBC 16.5, hgb 19.1 (hemoconcentration). Etoh negative. Given phenobarbital 260 mg x 1 in ED. EKG ST.   - CIWA  - prn lorazepam  - vitamins  - declines CD assessment    Pancreatitis  Transaminitis/ elevate bilirubin  With epigastric pain. Lipase 1083. AST/ /109. TB 1.5. acute LFT abnormalities 2/2 Etoh.   - NPO  - IV fluids  - pantoprazole 40 mg IV BID  - prn analgesics, antiemetics  - CT a/p    Hypokalemia  K 3.3 in ED.   - replace per protocol    Mood disorder  [needs rec- bupropion 300 mg daily, fluvoxamine 200 mg at HS, lamotrigine 300 mg daily]  - resume meds with rec    Hx methamphetamine use disorder  Has had MSSA abscesses 2/2 IV meth use in the past. Denies meth use for several years.      Diet:   NPO  DVT Prophylaxis: Ambulate every shift  Martin Catheter: Not present  Lines: None     Cardiac Monitoring: None  Code Status:   Full    Clinically Significant Risk Factors Present on Admission        # Hypokalemia: Lowest K = 3.3 mmol/L in last 2 days, will replace as needed   # Hypocalcemia: Lowest Ca = 8.3 mg/dL in last 2 days, will monitor and replace as appropriate              # Overweight: Estimated body mass index is 27.41 kg/m  as calculated from the following:    Height as of this encounter: 1.702 m (5' 7\").    Weight as of this encounter: 79.4 kg (175 lb).            Disposition Plan      Expected Discharge Date: 07/06/2023                  Joel Rodriguez, " MD  Hospitalist Service  [facility]  Securely message with Mariela (more info)  Text page via [employer] Paging/Directory     ______________________________________________________________________    Chief Complaint   Abdominal pain, alcohol withdrawal    History is obtained from the patient, ED physician and electronic health record    History of Present Illness   Dariusz Ferreira is a 42 year old male who presents with alcohol withdrawal.  States he has a longstanding history of alcohol use.  He started drinking use age 16.  He also has multiple family members with alcohol use disorder and his father  from alcohol abuse.  Patient states that he entered his 30s he started binge drinking.  Been to multiple treatments in the past.  He has no history of withdrawal seizure.  He reports his last been started about 2 weeks ago lasted until Saturday, 3 days ago.  He stopped drinking and then by  he started having withdrawal symptoms.  Monday around noon he developed severe abdominal pain.  He had nausea vomiting which has been ongoing and the worst he has ever had.  He denies fevers or chills.  He has had some shortness of breath.  He points to his mid abdomen as the location of the pain.  He denies other drug use including meth.  He does have a history of methamphetamine use, he states he was for 2 years when he was having relationship issues.      Past Medical History    ADHD   Anxiety   Depressive disorder   HTN   Mild intermittent asthma   Alcohol use disorder   Tobacco use disorder   Insomnia   Alcohol withdrawal   Methamphetamine abuse   Esophageal reflux     Past Surgical History   Past Surgical History:   Procedure Laterality Date     mucous cyst removed from tongue       TONSILLECTOMY         Prior to Admission Medications   Prior to Admission Medications   Prescriptions Last Dose Informant Patient Reported? Taking?   LORazepam (ATIVAN) 0.5 MG tablet   No No   Sig: Take 2  tablets (1 mg) by mouth every 6 hours as needed for withdrawal   buPROPion (WELLBUTRIN XL) 300 MG 24 hr tablet   Yes No   Sig: Take 300 mg by mouth every morning   fluvoxaMINE (LUVOX) 100 MG tablet   Yes No   Sig: Take 200 mg by mouth At Bedtime   gabapentin (NEURONTIN) 300 MG capsule   No No   Sig: Take 1 capsule (300 mg) by mouth 3 times daily for 5 days   hydrOXYzine (ATARAX) 50 MG tablet   Yes No   Sig: Take 25-50 mg by mouth nightly as needed for other (sleep)   lamoTRIgine (LAMICTAL) 100 MG tablet   Yes No   Sig: Take 300 mg by mouth daily   tadalafil (CIALIS) 20 MG tablet   Yes No   Sig: Take 20 mg by mouth daily as needed      Facility-Administered Medications: None        Review of Systems    The 10 point Review of Systems is negative other than noted in the HPI or here.      Physical Exam   Vital Signs: Temp: 97.9  F (36.6  C)   BP: (!) 168/111 Pulse: 106   Resp: (!) 32 SpO2: 97 % O2 Device: None (Room air)    Weight: 175 lbs 0 oz    General Appearance: Alert, pleasant, in distress from abdominal pain  Respiratory: clear bilaterally  Cardiovascular: sinus tach, no murmur. No edema  GI: distended, tender to light touch  Skin: no rashes or lesions grossly    Other: CN grossly intact, RAMIREZ      Medical Decision Making       75 MINUTES SPENT BY ME on the date of service doing chart review, history, exam, documentation & further activities per the note.      Data     I have personally reviewed the following data over the past 24 hrs:    16.5 (H)  \   19.1 (H)   / 385     132 (L) 93 (L) 12.8 /  148 (H)   3.3 (L) 22 0.95 \       ALT: 109 (H) AST: 100 (H) AP: 79 TBILI: 1.5 (H)   ALB: 3.8 TOT PROTEIN: 6.5 LIPASE: 1,083 (H)       Imaging results reviewed over the past 24 hrs:   No results found for this or any previous visit (from the past 24 hour(s)).

## 2023-07-04 NOTE — ED PROVIDER NOTES
"  History     Chief Complaint:  Alcohol Problem       The history is provided by the patient.      Dariusz Ferreira is a 42 year old male with a history of alcohol abuse with withdrawal, anxiety, depressive disorder, and hypertension who presents with an alcohol problem. He endorses binge drinking for most of last week, and eventually stopped 3 days ago. Each day, he would usually have 8 beers along with a few shots of fireball. Since then, he has had nausea, vomiting, diaphoresis, and abdominal pain. Additionally, he has been unable to eat or drink.     Independent Historian:   None - Patient Only      Medications:    Wellbutrin   Lamictal   Luvox   Gabapentin   Atarax   Cialis   Ativan     Past Medical History:    ADHD   Anxiety   Depressive disorder   HTN   Mild intermittent asthma   Alcohol use disorder   Tobacco use disorder   Insomnia   Alcohol withdrawal   Methamphetamine abuse   Esophageal reflux     Past Surgical History:    Tonsillectomy   Tongue mucous cyst excision   Unspecified L ankle surgery     Physical Exam     Patient Vitals for the past 24 hrs:   BP Temp Pulse Resp SpO2 Height Weight   07/04/23 0100 -- -- 106 (!) 32 -- -- --   07/03/23 2350 (!) 168/111 97.9  F (36.6  C) (!) 154 22 97 % 1.702 m (5' 7\") 79.4 kg (175 lb)        Physical Exam  General: Appears anxious  Head: No signs of trauma.   CV: Tachycardic  Resp: Effort normal and breath sounds normal. No respiratory distress.   GI: Soft. There is epigastric tenderness.  No rebound or guarding.  Normal bowel sounds.  No CVA tenderness.  MSK: Normal range of motion. no edema. No Calf tenderness.  Neuro: The patient is alert and oriented to person, place, and time.  Strength in upper/lower extremities normal and symmetrical. Sensation normal. Speech normal.  Skin: Skin is warm and dry. No rash noted. Goose bumps noted  Psych: normal mood and affect. behavior is normal.       Emergency Department Course   ECG  ECG taken at 0002, ECG read at " 0008  Sinus tachycardia   Right atrial enlargement   Nonspecific ST abnormality   Abnormal ECG   Rate 137 bpm. WI interval 142 ms. QRS duration 72 ms. QT/QTc 278/419 ms. P-R-T axes 61 63 82.     CT Abdomen Pelvis w Contrast   Final Result   IMPRESSION:       1.  Acute interstitial edematous pancreatitis of the pancreas with associated cystic dystrophy of the proximal duodenal wall. No drainable peripancreatic fluid collections.      2.  Small 2.2 cm hypoattenuating focus in the pancreatic tail, indeterminate for a small, organized peripancreatic fluid collection versus a pancreatic lesion. Recommend follow-up pancreatic protocol MRI in 4-6 weeks for further characterization after    resolution of current episode of acute pancreatitis.      3.  Mild wall thickening of the distal esophagus suggesting esophagitis.      4.  Moderate hepatic steatosis.            Laboratory:  Labs Ordered and Resulted from Time of ED Arrival to Time of ED Departure   COMPREHENSIVE METABOLIC PANEL - Abnormal       Result Value    Sodium 132 (*)     Potassium 3.3 (*)     Chloride 93 (*)     Carbon Dioxide (CO2) 22      Anion Gap 17 (*)     Urea Nitrogen 12.8      Creatinine 0.95      Calcium 8.3 (*)     Glucose 148 (*)     Alkaline Phosphatase 79       (*)      (*)     Protein Total 6.5      Albumin 3.8      Bilirubin Total 1.5 (*)     GFR Estimate >90     LIPASE - Abnormal    Lipase 1,083 (*)    CBC WITH PLATELETS AND DIFFERENTIAL - Abnormal    WBC Count 16.5 (*)     RBC Count 6.54 (*)     Hemoglobin 19.1 (*)     Hematocrit 53.2 (*)     MCV 81      MCH 29.2      MCHC 35.9      RDW 11.7      Platelet Count 385      % Neutrophils 93      % Lymphocytes 3      % Monocytes 4      % Eosinophils 0      % Basophils 0      % Immature Granulocytes 0      NRBCs per 100 WBC 0      Absolute Neutrophils 15.3 (*)     Absolute Lymphocytes 0.5 (*)     Absolute Monocytes 0.6      Absolute Eosinophils 0.0      Absolute Basophils 0.0       Absolute Immature Granulocytes 0.1      Absolute NRBCs 0.0     ETHYL ALCOHOL LEVEL - Normal    Alcohol ethyl <0.01        Emergency Department Course & Assessments:    Interventions:  Medications   sodium chloride 0.9 % 1,000 mL with Infuvite Adult 10 mL, thiamine 100 mg, folic acid 1 mg infusion (has no administration in time range)   ondansetron (ZOFRAN) injection 4 mg (4 mg Intravenous $Given 7/4/23 0104)   HYDROmorphone (PF) (DILAUDID) injection 0.5 mg (has no administration in time range)   0.9% sodium chloride BOLUS (1,000 mLs Intravenous $New Bag 7/4/23 0004)   ondansetron (ZOFRAN) injection 4 mg (4 mg Intravenous $Given 7/4/23 0002)   diazepam (VALIUM) injection 5 mg (5 mg Intravenous $Given 7/4/23 0046)   famotidine (PEPCID) injection 20 mg (20 mg Intravenous $Given 7/4/23 0047)   pantoprazole (PROTONIX) IV push injection 40 mg (40 mg Intravenous $Given 7/4/23 0102)   PHENobarbital (LUMINAL) 260 mg in sodium chloride 0.9 % 102 mL intermittent infusion (260 mg Intravenous $Given 7/4/23 0109)      Assessments:  0021 I obtained history and examined the patient as noted above.  0117 I rechecked the patient and explained findings. I discussed plan for admission to the hospital.    Independent Interpretation (X-rays, CTs, rhythm strip):  Independent interpretation of abdominal CT without significant hydronephrosis      Consultations/Discussion of Management or Tests:  0124 I spoke with Dr. Rodriguez from Hospitalist Services, who accepts the patient for admission.    Social Determinants of Health affecting care:   Hx of polysubstance abuse    Disposition:  The patient was admitted to the hospital under the care of Dr. Rodriguez.     Impression & Plan    Medical Decision Making:  Dariusz Ferreira presents due to alcohol withdrawal.  He has a history of alcohol abuse and reports of the last week he been binge drinking.  He had stopped drinking alcohol and reports that throughout the whole day he has been vomiting  and developed epigastric abdominal pain.  On evaluation he was tachycardic, hypertensive, anxious, and had goosebumps.  This presentation very consistent with alcohol withdrawal.  He was given IV fluids along with Valium and phenobarbital.  On my evaluation he also had epigastric tenderness.  Blood work was obtained that did show an elevated lipase level consistent with pancreatitis likely secondary to his alcohol use.  His symptoms improved with the above medications and he did agree to stay in the hospital given the pancreatitis.    Diagnosis:    ICD-10-CM    1. Alcohol-induced acute pancreatitis, unspecified complication status  K85.20       2. Alcohol withdrawal, uncomplicated (H)  F10.930            Scribe Disclosure:  I, Macario Seng, am serving as a scribe at 12:29 AM on 7/4/2023 to document services personally performed by Yuri Thomas MD based on my observations and the provider's statements to me.   7/4/2023   Yuri Thomas MD Bergenstal, John A, MD  07/04/23 0507

## 2023-07-04 NOTE — CODE/RAPID RESPONSE
"[facility]  House JUANIS RRT Note  7/4/2023   Time called: 0533  RRT called for: Seizure  Code status: Full Code    Assessment & Plan    Patient is a 42-year-old male with PMH significant for alcohol use disorder, methamphetamine use disorder, ADHD, anxiety, depression, HTN, esohpageal reflux, and mild intermittent asthma. Patient was admitted on 7/4/2023 with abdominal pain and alcohol withdrawal. Patient was found to have pancreatitis. Patient denied withdrawal seizure history.    I was paged to the bedside to evaluate Mr. Dariusz Ferreira for an acute episode of agitation followed by a tonic-clonic seizure.    Upon my arrival the patient was supine in bed and was ill-appearing. Patient was obtunded and did not respond to noxious stimuli. Patient's pupils were reactive and he had roving eye movements. Patient was tachycardic and hypertensive. Patient was on oxymask with saturation of 99%. Patient was warm and diaphortic with 2+ pulses in extremities. No peripheral edema noted. Abdomen was distended.  BP (!) 158/114   Pulse 116   Temp 97.9  F (36.6  C)   Resp 27   Ht 1.702 m (5' 7\")   Wt 79.4 kg (175 lb)   SpO2 99%   BMI 27.41 kg/m        Diagnosis:  -- Alcohol withdrawal with seizures  -- Alcohol use disorder  Patient had stated to admitting hospitalist that his last drink was Saturday with symptoms of withdrawal starting on Sunday. Patient received 7mg ativan and 260mg phenobarbital prior to his seizure episode.    Plan:  -- Administer 2mg lorazepam now  -- Administer 130mg Phenobarbital now  -- If patient has another seizure then start phenobarbital infusion  -- Wait for postictal state to clear and assess need for ICU versus floor admission (if further seizures then ICU for close monitoring)  -- CMP, CBC, Magnesium    At the conclusion of this RRT patient was hemodynamically stable and will transfer to the ICU for close monitoring.    His history is significant for:  Past Medical " History:   Diagnosis Date     ADHD (attention deficit hyperactivity disorder)      Anxiety      Depressive disorder      Hypertension      Medical history unknown     ** Patient takes Truvada but states he is not HIV positive.  He takes it because his  is HIV positive.     Mild intermittent asthma      Past Surgical History:   Procedure Laterality Date     mucous cyst removed from tongue  2003     TONSILLECTOMY  1987       Review of Systems   The 10 point Review of Systems is negative other than noted in the HPI or here.     Allergies   Allergies   Allergen Reactions     Penicillins      Erythromycin Rash       Physical Exam   Physical Exam  Constitutional:       Appearance: He is ill-appearing and diaphoretic.      Comments: Unresponsive/postictal   Eyes:      Pupils: Pupils are equal, round, and reactive to light.   Cardiovascular:      Rate and Rhythm: Regular rhythm. Tachycardia present.   Pulmonary:      Effort: No respiratory distress.   Abdominal:      General: There is distension.      Palpations: Abdomen is soft.   Musculoskeletal:      Right lower leg: No edema.      Left lower leg: No edema.   Skin:     General: Skin is warm.      Capillary Refill: Capillary refill takes less than 2 seconds.         Vital Signs with Ranges:  Temp:  [97.9  F (36.6  C)] 97.9  F (36.6  C)  Pulse:  [106-154] 116  Resp:  [17-35] 27  BP: (150-173)/(105-140) 158/114  SpO2:  [96 %-99 %] 99 %  No intake/output data recorded.    Data   Results for orders placed or performed during the hospital encounter of 07/04/23 (from the past 24 hour(s))   CBC with platelets + differential    Narrative    The following orders were created for panel order CBC with platelets + differential.  Procedure                               Abnormality         Status                     ---------                               -----------         ------                     CBC with platelets and d...[[id number]]  Abnormal            Final result                  Please view results for these tests on the individual orders.   Lipase   Result Value Ref Range    Lipase 1,083 (H) 13 - 60 U/L   Alcohol level blood   Result Value Ref Range    Alcohol ethyl <0.01 <=0.01 g/dL   [place] Draw    Narrative    The following orders were created for panel order [place] Draw.  Procedure                               Abnormality         Status                     ---------                               -----------         ------                     Extra Blue Top Tube[[id number]]                              Final result               Extra Red Top Tube[[id number]]                               Final result                 Please view results for these tests on the individual orders.   CBC with platelets and differential   Result Value Ref Range    WBC Count 16.5 (H) 4.0 - 11.0 10e3/uL    RBC Count 6.54 (H) 4.40 - 5.90 10e6/uL    Hemoglobin 19.1 (H) 13.3 - 17.7 g/dL    Hematocrit 53.2 (H) 40.0 - 53.0 %    MCV 81 78 - 100 fL    MCH 29.2 26.5 - 33.0 pg    MCHC 35.9 31.5 - 36.5 g/dL    RDW 11.7 10.0 - 15.0 %    Platelet Count 385 150 - 450 10e3/uL    % Neutrophils 93 %    % Lymphocytes 3 %    % Monocytes 4 %    % Eosinophils 0 %    % Basophils 0 %    % Immature Granulocytes 0 %    NRBCs per 100 WBC 0 <1 /100    Absolute Neutrophils 15.3 (H) 1.6 - 8.3 10e3/uL    Absolute Lymphocytes 0.5 (L) 0.8 - 5.3 10e3/uL    Absolute Monocytes 0.6 0.0 - 1.3 10e3/uL    Absolute Eosinophils 0.0 0.0 - 0.7 10e3/uL    Absolute Basophils 0.0 0.0 - 0.2 10e3/uL    Absolute Immature Granulocytes 0.1 <=0.4 10e3/uL    Absolute NRBCs 0.0 10e3/uL   Extra Blue Top Tube   Result Value Ref Range    Hold Specimen JIC    Extra Red Top Tube   Result Value Ref Range    Hold Specimen JIC    EKG 12 lead   Result Value Ref Range    Systolic Blood Pressure  mmHg    Diastolic Blood Pressure  mmHg    Ventricular Rate 137 BPM    Atrial Rate 137 BPM    CO Interval 142 ms    QRS Duration 72 ms     ms    QTc 419 ms    P  Axis 61 degrees    R AXIS 63 degrees    T Axis 82 degrees    Interpretation ECG       Sinus tachycardia  Right atrial enlargement  Nonspecific ST abnormality  Abnormal ECG  When compared with ECG of 10-APR-2023 17:40,  Non-specific change in ST segment in Inferior leads  Confirmed by GENERATED REPORT, COMPUTER ([id number]),  Aasen, Bradley ([id number]) on 7/4/2023 12:23:35 AM     Comprehensive metabolic panel   Result Value Ref Range    Sodium 132 (L) 136 - 145 mmol/L    Potassium 3.3 (L) 3.4 - 5.3 mmol/L    Chloride 93 (L) 98 - 107 mmol/L    Carbon Dioxide (CO2) 22 22 - 29 mmol/L    Anion Gap 17 (H) 7 - 15 mmol/L    Urea Nitrogen 12.8 6.0 - 20.0 mg/dL    Creatinine 0.95 0.67 - 1.17 mg/dL    Calcium 8.3 (L) 8.6 - 10.0 mg/dL    Glucose 148 (H) 70 - 99 mg/dL    Alkaline Phosphatase 79 40 - 129 U/L     (H) 0 - 45 U/L     (H) 0 - 70 U/L    Protein Total 6.5 6.4 - 8.3 g/dL    Albumin 3.8 3.5 - 5.2 g/dL    Bilirubin Total 1.5 (H) <=1.2 mg/dL    GFR Estimate >90 >60 mL/min/1.73m2   Magnesium   Result Value Ref Range    Magnesium 2.0 1.7 - 2.3 mg/dL   Phosphorus   Result Value Ref Range    Phosphorus 1.5 (L) 2.5 - 4.5 mg/dL   CT Abdomen Pelvis w Contrast    Narrative    EXAM: CT ABDOMEN PELVIS W CONTRAST  LOCATION: [facility]  DATE: 7/4/2023    INDICATION: Epigastric pain  COMPARISON: None.  TECHNIQUE: CT scan of the abdomen and pelvis was performed following injection of IV contrast. Multiplanar reformats were obtained. Dose reduction techniques were used.  CONTRAST: 88 mL Isovue 370    FINDINGS:   LOWER CHEST: Mild atelectasis is seen in the right lower lobe. No confluent consolidation or pleural effusion. Distal esophagus is mildly thick-walled.    HEPATOBILIARY: Moderate steatosis noted without focal lesions. Gallbladder is normal without radiodense stones.     PANCREAS: Diffuse peripancreatic fat stranding is seen throughout with edema in the retroperitoneal fascial planes. No  drainable, organized peripancreatic fluid collections. A focal area of decreased enhancement is seen in the pancreatic tail measuring   2.2 x 1.5 cm on series 4 image 83.    SPLEEN: Normal.    ADRENAL GLANDS: Normal.    KIDNEYS/BLADDER: Normal. 2 small to characterize cystic lesions in the left kidney statistically represents cysts and do not require dedicated imaging follow-up.    BOWEL: The second portion of the duodenum is thickened with scattered areas of cystic attenuation along the medial wall. Appendix is normal. No bowel obstruction.    LYMPH NODES: Mildly prominent aortocaval lymph nodes in the retroperitoneum are present, most compatible with reactive nature.    VASCULATURE: Unremarkable.    PELVIC ORGANS: Normal.    MUSCULOSKELETAL: Normal.        Impression    IMPRESSION:     1.  Acute interstitial edematous pancreatitis of the pancreas with associated cystic dystrophy of the proximal duodenal wall. No drainable peripancreatic fluid collections.    2.  Small 2.2 cm hypoattenuating focus in the pancreatic tail, indeterminate for a small, organized peripancreatic fluid collection versus a pancreatic lesion. Recommend follow-up pancreatic protocol MRI in 4-6 weeks for further characterization after   resolution of current episode of acute pancreatitis.    3.  Mild wall thickening of the distal esophagus suggesting esophagitis.    4.  Moderate hepatic steatosis.   EKG 12-lead, tracing only   Result Value Ref Range    Systolic Blood Pressure  mmHg    Diastolic Blood Pressure  mmHg    Ventricular Rate 134 BPM    Atrial Rate 134 BPM    VT Interval 150 ms    QRS Duration 78 ms     ms    QTc 427 ms    P Axis 47 degrees    R AXIS 6 degrees    T Axis 77 degrees    Interpretation ECG       Sinus tachycardia  Possible Left atrial enlargement  Borderline ECG  When compared with ECG of 04-JUL-2023 00:02,  Questionable change in QRS axis  Non-specific change in ST segment in Inferior leads     Glucose by meter    Result Value Ref Range    GLUCOSE BY METER POCT 124 (H) 70 - 99 mg/dL     COVID-19 PCR Results         No data to display            COVID-19 Antibody Results, Testing for Immunity         No data to display                Time Spent on this Encounter   I spent 35 minutes of critical care time on the unit/floor managing the care of Dariusz Ferreira. Upon evaluation, this patient had a high probability of imminent or life-threatening deterioration due to alcohol withdrawal with seizures, which required my direct attention, intervention, and personal management. 100% of my time was spent at the bedside counseling the patient and/or coordinating care regarding services listed in this note.    DIMITRIOS Donaldson, CNP  Hospitalist - House SIRIA  Text me on the ID [employer] siria for a textback  Text page with web based paging for a callback

## 2023-07-04 NOTE — ED NOTES
[facility]  ED Nurse Handoff Report    ED Chief complaint: Alcohol Problem      ED Diagnosis:   Final diagnoses:   Alcohol-induced acute pancreatitis, unspecified complication status   Alcohol withdrawal, uncomplicated (H)       Code Status: Per admitting provider     Allergies:   Allergies   Allergen Reactions    Penicillins     Erythromycin Rash       Patient Story:  Pt c/o LUQ abd pain, states binge drinks, has been drinking heavily last 2 weeks, stopped Saturday, withdrawal started Sunday and abdominal pain  with x five clear color emesis yesterday.      Focused Assessment:   A/O x 4, increased tremors bilateral extremities,nausea and abd pain 8/10. See CIWA scores.    Addendum: Pt with visual hallucination, restlessness and fidgety 3 seconds  full body seizures RRT called.     Treatments and/or interventions provided: Labs,  CT/Abd ,Zofran, Dilaudid, Pepcid, Protonix,   Ativan, Haldol, NS x 2 , phenobarbital and Banana bag.    Results for orders placed or performed during the hospital encounter of 07/04/23   Comprehensive metabolic panel     Status: Abnormal   Result Value Ref Range    Sodium 132 (L) 136 - 145 mmol/L    Potassium 3.3 (L) 3.4 - 5.3 mmol/L    Chloride 93 (L) 98 - 107 mmol/L    Carbon Dioxide (CO2) 22 22 - 29 mmol/L    Anion Gap 17 (H) 7 - 15 mmol/L    Urea Nitrogen 12.8 6.0 - 20.0 mg/dL    Creatinine 0.95 0.67 - 1.17 mg/dL    Calcium 8.3 (L) 8.6 - 10.0 mg/dL    Glucose 148 (H) 70 - 99 mg/dL    Alkaline Phosphatase 79 40 - 129 U/L     (H) 0 - 45 U/L     (H) 0 - 70 U/L    Protein Total 6.5 6.4 - 8.3 g/dL    Albumin 3.8 3.5 - 5.2 g/dL    Bilirubin Total 1.5 (H) <=1.2 mg/dL    GFR Estimate >90 >60 mL/min/1.73m2   Lipase     Status: Abnormal   Result Value Ref Range    Lipase 1,083 (H) 13 - 60 U/L   Alcohol level blood     Status: Normal   Result Value Ref Range    Alcohol ethyl <0.01 <=0.01 g/dL   [place] Draw     Status: None    Narrative    The following orders were  created for panel order [place] Draw.  Procedure                               Abnormality         Status                     ---------                               -----------         ------                     Extra Blue Top Tube[[id number]]                              Final result               Extra Red Top Tube[[id number]]                               Final result                 Please view results for these tests on the individual orders.   CBC with platelets and differential     Status: Abnormal   Result Value Ref Range    WBC Count 16.5 (H) 4.0 - 11.0 10e3/uL    RBC Count 6.54 (H) 4.40 - 5.90 10e6/uL    Hemoglobin 19.1 (H) 13.3 - 17.7 g/dL    Hematocrit 53.2 (H) 40.0 - 53.0 %    MCV 81 78 - 100 fL    MCH 29.2 26.5 - 33.0 pg    MCHC 35.9 31.5 - 36.5 g/dL    RDW 11.7 10.0 - 15.0 %    Platelet Count 385 150 - 450 10e3/uL    % Neutrophils 93 %    % Lymphocytes 3 %    % Monocytes 4 %    % Eosinophils 0 %    % Basophils 0 %    % Immature Granulocytes 0 %    NRBCs per 100 WBC 0 <1 /100    Absolute Neutrophils 15.3 (H) 1.6 - 8.3 10e3/uL    Absolute Lymphocytes 0.5 (L) 0.8 - 5.3 10e3/uL    Absolute Monocytes 0.6 0.0 - 1.3 10e3/uL    Absolute Eosinophils 0.0 0.0 - 0.7 10e3/uL    Absolute Basophils 0.0 0.0 - 0.2 10e3/uL    Absolute Immature Granulocytes 0.1 <=0.4 10e3/uL    Absolute NRBCs 0.0 10e3/uL   Extra Blue Top Tube     Status: None   Result Value Ref Range    Hold Specimen JI    Extra Red Top Tube     Status: None   Result Value Ref Range    Hold Specimen [facility]    EKG 12 lead     Status: None   Result Value Ref Range    Systolic Blood Pressure  mmHg    Diastolic Blood Pressure  mmHg    Ventricular Rate 137 BPM    Atrial Rate 137 BPM    DE Interval 142 ms    QRS Duration 72 ms     ms    QTc 419 ms    P Axis 61 degrees    R AXIS 63 degrees    T Axis 82 degrees    Interpretation ECG       Sinus tachycardia  Right atrial enlargement  Nonspecific ST abnormality  Abnormal ECG  When compared with ECG of  "10-APR-2023 17:40,  Non-specific change in ST segment in Inferior leads  Confirmed by GENERATED REPORT, COMPUTER ([id number]),  Aasen, Bradley ([id number]) on 7/4/2023 12:23:35 AM     CBC with platelets + differential     Status: Abnormal    Narrative    The following orders were created for panel order CBC with platelets + differential.  Procedure                               Abnormality         Status                     ---------                               -----------         ------                     CBC with platelets and d...[[id number]]  Abnormal            Final result                 Please view results for these tests on the individual orders.      Patient's response to treatments and/or interventions: Stable     To be done/followed up on inpatient unit:  Monitor     Does this patient have any cognitive concerns?:  no     Activity level - Baseline/Home:  Independent  Activity Level - Current:   Independent    Patient's Preferred language: English   Needed?: No    Isolation: None  Infection: Not Applicable  Patient tested for COVID 19 prior to admission: YES  Bariatric?: No    Vital Signs:   Vitals:    07/03/23 2350 07/04/23 0045 07/04/23 0100 07/04/23 0145   BP: (!) 168/111 (!) 151/105  (!) 158/138   BP Location: Right arm      Pulse: (!) 154 112 106 110   Resp: 22 (!) 35 (!) 32 18   Temp: 97.9  F (36.6  C)      SpO2: 97% 97%  97%   Weight: 79.4 kg (175 lb)      Height: 1.702 m (5' 7\")          Cardiac Rhythm: ST rate 118    Was the PSS-3 completed:   Yes  What interventions are required if any?               Family Comments:  went home   OBS brochure/video discussed/provided to patient/family: Yes              Name of person given brochure if not patient: N/A               Relationship to patient: N/A     For the majority of the shift this patient's behavior was Green.   Behavioral interventions performed were None .    ED NURSE PHONE NUMBER: *[id number]        "

## 2023-07-04 NOTE — ED TRIAGE NOTES
Pt presents in apparent ETOH withdrawal. Pt states last drink was on Saturday. Previous to this pt was drinking minimum 10 drinks a day. Pt is diaphoretic, nauseous, tremulous in triage.

## 2023-07-04 NOTE — PROGRESS NOTES
Brief hospitalist note  Seen and examined.  42 year old male with pmh of alcoholism and possibly history of methamphetamine use admitted in severe withdrawal and pancreatitis admitted for CIWA and treatment of pancreatitis this morning. Course already complicated by a seizure after admission. Currently in the ICU, and is somnolent after 19 mg of total ativan given for withdrawal, but does have intermittent periods of agitation. Does awaken, but does not speak. Whole body somewhat rigid, but moving all extremities wtihout facial droop. Exam consistent with postictal period    Management will focus on treament of his withdrawal with bzd and precedex. IVF and pain medications for the pancreatitis. Currently the patient denies abdominal pain.    Plan  - as per todays history and physical  - add precedex  - monitor respirations  - CT head when able  - monitor urinary output    I did update Zeyad, the patients  and primary contact. He notes historically no withdrawal seizure but severe withdrawal after with up to 5 days of hospitalization noted.

## 2023-07-04 NOTE — LETTER
[facility] INTENSIVE CARE  [address]  [place] [address]  Phone: [phone]    July 11, 2023        Dariusz Ferreira  [address] [facility] [address]          To whom it may concern:    RE: Dariusz Ferreira has been hospitalized at [facility] since 7/4/23. At the time of writing, he remains under my care where he is receiving medically necessary care. He is currently unable to return to work. I am unable to give an accurate representation of when he will return to work based on his current condition. I am unable to estimate if he will need accomodation based on his current condition. He will likely remain hospitalized until at least 7/14/23. This date is an estimate based on my best medical judgement of his current medical state. Based on numerous factors, this date may change. Again, I am unable to estimate, at this time, when he would be able to return to work or if he will have restrictions.     Please contact me for questions or concerns.      Sincerely,        Wilbur Gatica MD  Hospitalist, [facility]

## 2023-07-04 NOTE — LETTER
[facility] MEDICAL SPECIALTY UNIT  [address] [place] [address]  Phone: [phone]    July 14, 2023        Dariusz Ferreira  [address] [facility] [address]          To whom it may concern:    RE: Dariusz Ferreira was hospitalized at [facility] 7/4-7/14/23; please excuse him for these dates.  He will require further time off for ongoing treatment at a separate inpatient facility starting 7/15/23, and would request he be excused for the near future (more definitive timeline can be provided by his next facility).     Please contact me for questions or concerns.      Sincerely,        Lorenzo Beavers MD

## 2023-07-04 NOTE — PROGRESS NOTES
The patient is alert to self. He is mostly pleasant. Follows commands, and helps staff to turn and reposition him in the bed.  Precedex infusion to keep the patient calm. Ativan 2mg IV push given multiple times per CIWA scale.   Lung sounds diminished. 6-10L oxymask to keep SPO2>92%  NPO. Oral care d\one with water swabs.   Patient voiding with external catheter. Adequate output. Liliam-cares done. Device changed x1 this shift.   Dilaudid 0.5mg x1 dose for abdominal pain. Patient able to rest well after dose.   Yuri Astudillo RN 7:20 PM 07/04/23

## 2023-07-05 ENCOUNTER — APPOINTMENT (OUTPATIENT)
Dept: GENERAL RADIOLOGY | Facility: CLINIC | Age: 43
DRG: 896 | End: 2023-07-05
Attending: HOSPITALIST
Payer: COMMERCIAL

## 2023-07-05 LAB
ALBUMIN SERPL BCG-MCNC: 2.9 G/DL (ref 3.5–5.2)
ALP SERPL-CCNC: 59 U/L (ref 40–129)
ALT SERPL W P-5'-P-CCNC: 47 U/L (ref 0–70)
ANION GAP SERPL CALCULATED.3IONS-SCNC: 9 MMOL/L (ref 7–15)
AST SERPL W P-5'-P-CCNC: 38 U/L (ref 0–45)
BILIRUB SERPL-MCNC: 0.6 MG/DL
BUN SERPL-MCNC: 7.7 MG/DL (ref 6–20)
CALCIUM SERPL-MCNC: 7.7 MG/DL (ref 8.6–10)
CHLORIDE SERPL-SCNC: 110 MMOL/L (ref 98–107)
CREAT SERPL-MCNC: 0.8 MG/DL (ref 0.67–1.17)
DEPRECATED HCO3 PLAS-SCNC: 21 MMOL/L (ref 22–29)
ERYTHROCYTE [DISTWIDTH] IN BLOOD BY AUTOMATED COUNT: 12.9 % (ref 10–15)
GFR SERPL CREATININE-BSD FRML MDRD: >90 ML/MIN/1.73M2
GLUCOSE BLDC GLUCOMTR-MCNC: 72 MG/DL (ref 70–99)
GLUCOSE BLDC GLUCOMTR-MCNC: 76 MG/DL (ref 70–99)
GLUCOSE BLDC GLUCOMTR-MCNC: 91 MG/DL (ref 70–99)
GLUCOSE SERPL-MCNC: 90 MG/DL (ref 70–99)
HCT VFR BLD AUTO: 39.6 % (ref 40–53)
HGB BLD-MCNC: 13.5 G/DL (ref 13.3–17.7)
MCH RBC QN AUTO: 30 PG (ref 26.5–33)
MCHC RBC AUTO-ENTMCNC: 34.1 G/DL (ref 31.5–36.5)
MCV RBC AUTO: 88 FL (ref 78–100)
PLATELET # BLD AUTO: 175 10E3/UL (ref 150–450)
POTASSIUM SERPL-SCNC: 3.8 MMOL/L (ref 3.4–5.3)
PROT SERPL-MCNC: 5.5 G/DL (ref 6.4–8.3)
RBC # BLD AUTO: 4.5 10E6/UL (ref 4.4–5.9)
SODIUM SERPL-SCNC: 140 MMOL/L (ref 136–145)
TRIGL SERPL-MCNC: 115 MG/DL
WBC # BLD AUTO: 13.3 10E3/UL (ref 4–11)

## 2023-07-05 PROCEDURE — 99291 CRITICAL CARE FIRST HOUR: CPT | Performed by: HOSPITALIST

## 2023-07-05 PROCEDURE — 71045 X-RAY EXAM CHEST 1 VIEW: CPT

## 2023-07-05 PROCEDURE — 250N000011 HC RX IP 250 OP 636: Performed by: INTERNAL MEDICINE

## 2023-07-05 PROCEDURE — 200N000001 HC R&B ICU

## 2023-07-05 PROCEDURE — 250N000011 HC RX IP 250 OP 636: Mod: JZ | Performed by: HOSPITALIST

## 2023-07-05 PROCEDURE — 85027 COMPLETE CBC AUTOMATED: CPT | Performed by: HOSPITALIST

## 2023-07-05 PROCEDURE — 80307 DRUG TEST PRSMV CHEM ANLYZR: CPT | Performed by: INTERNAL MEDICINE

## 2023-07-05 PROCEDURE — 99292 CRITICAL CARE ADDL 30 MIN: CPT | Performed by: HOSPITALIST

## 2023-07-05 PROCEDURE — 84478 ASSAY OF TRIGLYCERIDES: CPT | Performed by: HOSPITALIST

## 2023-07-05 PROCEDURE — C9113 INJ PANTOPRAZOLE SODIUM, VIA: HCPCS | Mod: JZ | Performed by: INTERNAL MEDICINE

## 2023-07-05 PROCEDURE — 999N000127 HC STATISTIC PERIPHERAL IV START W US GUIDANCE

## 2023-07-05 PROCEDURE — 258N000003 HC RX IP 258 OP 636: Performed by: INTERNAL MEDICINE

## 2023-07-05 PROCEDURE — 250N000011 HC RX IP 250 OP 636: Performed by: PHYSICIAN ASSISTANT

## 2023-07-05 PROCEDURE — 999N000040 HC STATISTIC CONSULT NO CHARGE VASC ACCESS

## 2023-07-05 PROCEDURE — 80053 COMPREHEN METABOLIC PANEL: CPT | Performed by: HOSPITALIST

## 2023-07-05 PROCEDURE — 99207 PR NO BILLABLE SERVICE THIS VISIT: CPT | Performed by: HOSPITALIST

## 2023-07-05 PROCEDURE — 250N000009 HC RX 250: Performed by: HOSPITALIST

## 2023-07-05 PROCEDURE — 36415 COLL VENOUS BLD VENIPUNCTURE: CPT | Performed by: HOSPITALIST

## 2023-07-05 RX ORDER — HYDROMORPHONE HYDROCHLORIDE 1 MG/ML
0.5 INJECTION, SOLUTION INTRAMUSCULAR; INTRAVENOUS; SUBCUTANEOUS
Status: DISCONTINUED | OUTPATIENT
Start: 2023-07-05 | End: 2023-07-13

## 2023-07-05 RX ORDER — ENOXAPARIN SODIUM 100 MG/ML
40 INJECTION SUBCUTANEOUS EVERY 24 HOURS
Status: DISCONTINUED | OUTPATIENT
Start: 2023-07-05 | End: 2023-07-14 | Stop reason: HOSPADM

## 2023-07-05 RX ADMIN — LORAZEPAM 1 MG: 2 INJECTION INTRAMUSCULAR; INTRAVENOUS at 20:54

## 2023-07-05 RX ADMIN — LORAZEPAM 2 MG: 2 INJECTION INTRAMUSCULAR; INTRAVENOUS at 04:58

## 2023-07-05 RX ADMIN — HYDROMORPHONE HYDROCHLORIDE 0.5 MG: 1 INJECTION, SOLUTION INTRAMUSCULAR; INTRAVENOUS; SUBCUTANEOUS at 20:40

## 2023-07-05 RX ADMIN — SODIUM CHLORIDE: 9 INJECTION, SOLUTION INTRAVENOUS at 16:45

## 2023-07-05 RX ADMIN — PANTOPRAZOLE SODIUM 40 MG: 40 INJECTION, POWDER, FOR SOLUTION INTRAVENOUS at 20:28

## 2023-07-05 RX ADMIN — LORAZEPAM 2 MG: 2 INJECTION INTRAMUSCULAR; INTRAVENOUS at 22:51

## 2023-07-05 RX ADMIN — PANTOPRAZOLE SODIUM 40 MG: 40 INJECTION, POWDER, FOR SOLUTION INTRAVENOUS at 08:03

## 2023-07-05 RX ADMIN — LORAZEPAM 2 MG: 2 INJECTION INTRAMUSCULAR; INTRAVENOUS at 06:48

## 2023-07-05 RX ADMIN — LORAZEPAM 2 MG: 2 INJECTION INTRAMUSCULAR; INTRAVENOUS at 09:30

## 2023-07-05 RX ADMIN — FOLIC ACID 1 MG: 5 INJECTION, SOLUTION INTRAMUSCULAR; INTRAVENOUS; SUBCUTANEOUS at 08:03

## 2023-07-05 RX ADMIN — LORAZEPAM 2 MG: 2 INJECTION INTRAMUSCULAR; INTRAVENOUS at 21:40

## 2023-07-05 RX ADMIN — LORAZEPAM 2 MG: 2 INJECTION INTRAMUSCULAR; INTRAVENOUS at 01:35

## 2023-07-05 RX ADMIN — LORAZEPAM 2 MG: 2 INJECTION INTRAMUSCULAR; INTRAVENOUS at 16:24

## 2023-07-05 RX ADMIN — THIAMINE HYDROCHLORIDE 100 MG: 100 INJECTION, SOLUTION INTRAMUSCULAR; INTRAVENOUS at 08:03

## 2023-07-05 RX ADMIN — LORAZEPAM 2 MG: 2 INJECTION INTRAMUSCULAR; INTRAVENOUS at 23:13

## 2023-07-05 RX ADMIN — DEXMEDETOMIDINE HYDROCHLORIDE 1.2 MCG/KG/HR: 400 INJECTION INTRAVENOUS at 07:12

## 2023-07-05 RX ADMIN — DEXMEDETOMIDINE HYDROCHLORIDE 1.2 MCG/KG/HR: 400 INJECTION INTRAVENOUS at 15:45

## 2023-07-05 RX ADMIN — LORAZEPAM 2 MG: 2 INJECTION INTRAMUSCULAR; INTRAVENOUS at 02:39

## 2023-07-05 RX ADMIN — LORAZEPAM 2 MG: 2 INJECTION INTRAMUSCULAR; INTRAVENOUS at 04:21

## 2023-07-05 RX ADMIN — LORAZEPAM 1 MG: 2 INJECTION INTRAMUSCULAR; INTRAVENOUS at 20:28

## 2023-07-05 RX ADMIN — LORAZEPAM 2 MG: 2 INJECTION INTRAMUSCULAR; INTRAVENOUS at 00:15

## 2023-07-05 RX ADMIN — HYDROMORPHONE HYDROCHLORIDE 0.5 MG: 1 INJECTION, SOLUTION INTRAMUSCULAR; INTRAVENOUS; SUBCUTANEOUS at 01:37

## 2023-07-05 RX ADMIN — LORAZEPAM 2 MG: 2 INJECTION INTRAMUSCULAR; INTRAVENOUS at 13:18

## 2023-07-05 RX ADMIN — SODIUM CHLORIDE: 9 INJECTION, SOLUTION INTRAVENOUS at 22:57

## 2023-07-05 RX ADMIN — LORAZEPAM 2 MG: 2 INJECTION INTRAMUSCULAR; INTRAVENOUS at 11:20

## 2023-07-05 RX ADMIN — ENOXAPARIN SODIUM 40 MG: 40 INJECTION SUBCUTANEOUS at 13:18

## 2023-07-05 RX ADMIN — DEXMEDETOMIDINE HYDROCHLORIDE 1.2 MCG/KG/HR: 400 INJECTION INTRAVENOUS at 22:51

## 2023-07-05 RX ADMIN — LORAZEPAM 2 MG: 2 INJECTION INTRAMUSCULAR; INTRAVENOUS at 22:12

## 2023-07-05 RX ADMIN — HYDROMORPHONE HYDROCHLORIDE 0.5 MG: 1 INJECTION, SOLUTION INTRAMUSCULAR; INTRAVENOUS; SUBCUTANEOUS at 05:41

## 2023-07-05 RX ADMIN — LORAZEPAM 2 MG: 2 INJECTION INTRAMUSCULAR; INTRAVENOUS at 12:46

## 2023-07-05 RX ADMIN — LORAZEPAM 2 MG: 2 INJECTION INTRAMUSCULAR; INTRAVENOUS at 05:41

## 2023-07-05 RX ADMIN — LORAZEPAM 2 MG: 2 INJECTION INTRAMUSCULAR; INTRAVENOUS at 11:49

## 2023-07-05 RX ADMIN — DEXMEDETOMIDINE HYDROCHLORIDE 1.2 MCG/KG/HR: 400 INJECTION INTRAVENOUS at 19:13

## 2023-07-05 RX ADMIN — DEXMEDETOMIDINE HYDROCHLORIDE 1.2 MCG/KG/HR: 400 INJECTION INTRAVENOUS at 11:29

## 2023-07-05 RX ADMIN — HALOPERIDOL LACTATE 5 MG: 5 INJECTION, SOLUTION INTRAMUSCULAR at 11:20

## 2023-07-05 RX ADMIN — DEXMEDETOMIDINE HYDROCHLORIDE 1.2 MCG/KG/HR: 400 INJECTION INTRAVENOUS at 03:13

## 2023-07-05 RX ADMIN — LORAZEPAM 2 MG: 2 INJECTION INTRAMUSCULAR; INTRAVENOUS at 08:59

## 2023-07-05 RX ADMIN — SODIUM CHLORIDE: 9 INJECTION, SOLUTION INTRAVENOUS at 09:55

## 2023-07-05 ASSESSMENT — ACTIVITIES OF DAILY LIVING (ADL)
ADLS_ACUITY_SCORE: 47
ADLS_ACUITY_SCORE: 43

## 2023-07-05 NOTE — PLAN OF CARE
"Goal Outcome Evaluation:    Alert to self. Increased tremors and agitation throughout the night with patient sitting up and making attempts to self exit bed \"I need to get out of here\". CIWA scores averaged 17. Received lorazepam every 30-60 min, in addition to maxing out on precedex gtt at 1.2  mcg/kg/hr. Received dilaudid iv push for abdominal pain (see mar). Sinus to Sinus tach. Tachypneic with rr of 25-38. 1:1 sitter for patient safety d/t impulsiveness. 650 mL of dark raiza (concentrated) urine from 0986-0987. 0645 bladder scan of 159 mL.   "

## 2023-07-05 NOTE — PROGRESS NOTES
[facility]    Medicine Progress Note - Hospitalist Service    Date of Admission:  7/4/2023    Assessment & Plan   Alcohol withdrawal with alcohol withdrawal seizure  Alcohol use disorder  Longstanding hx alcohol use disorder. Has been to treatment 5+ times in the past. States binge drinks, has been drinking heavily last 2 weeks, stopped Saturday, withdrawal started Sunday and abdominal pain today. In ED tachy 100s, hypertensive, afebrile. WBC 16.5, hgb 19.1 (hemoconcentration). Etoh negative. Given phenobarbital 260 mg x 1 in ED. EKG ST.   Did suffer a generalized tonic clonic seizure the day of admission, and transferred to the ICU with initiation of precedex infusion  plan  - CIWA with prn lorazepam  - continue precedex  - vitamins including IV thamine  - will need chemical dependence eval, but reportedly declined immediately at admission    Acute hypoxemic respiratory failure  Likely secondary to sedation, but we will evaluate for signs of developing pneumonia. Overall SIRS criteria are improving  Plan  - CXR and low threshold for abx given his aspiration risk  - aspiration precautions  - monitor for fever    Toxic metabolic encephalopathy  Due to medication for withdrawal and likely underlying DTs  Plan  - wean sedatives as tolerates     Pancreatitis  Transaminitis/ elevate bilirubin  Pancreatic tail lesion  Alcoholic gastritis  With epigastric pain. Lipase 1083. AST/ /109. TB 1.5. acute LFT abnormalities 2/2 Etoh.   CT with acute interstitial pancreatitis  - SIRS criteria improving and no overt organ  Failure yet, probably would characterize as moderate pancreatitis. ICU needed primarily for alcohol withdrawal  - NPO. Try to wean sedation today and encourage some oral intake. May need to consider enteral nutrition in the next few days if unable to take orals  - IV fluids to remain at   - pantoprazole 40 mg IV BID  - prn analgesics, antiemetics  - will need follow-up imaging of  "the tail lesion, will need to discuss with patient when able     Hypokalemia  K 3.3 in ED.   - replace per protocol     Mood disorder  [needs rec- bupropion 300 mg daily, fluvoxamine 200 mg at HS, lamotrigine 300 mg daily]  - resume meds with rec     Hx methamphetamine use disorder  Has had MSSA abscesses 2/2 IV meth use in the past. Denies meth use for several years.   Plan  - still awaiting drug screen          Diet: NPO for Medical/Clinical Reasons Except for: Meds, Ice Chips    DVT Prophylaxis: Enoxaparin (Lovenox) SQ and Pneumatic Compression Devices  Martin Catheter: Not present  Lines: None     Cardiac Monitoring: ACTIVE order. Indication: ICU  Code Status: Full Code      Clinically Significant Risk Factors        # Hypokalemia: Lowest K = 3.3 mmol/L in last 2 days, will replace as needed       # Hypoalbuminemia: Lowest albumin = 2.9 g/dL at 7/5/2023  5:01 AM, will monitor as appropriate            # Overweight: Estimated body mass index is 27.41 kg/m  as calculated from the following:    Height as of this encounter: 1.702 m (5' 7\").    Weight as of this encounter: 79.4 kg (175 lb)., PRESENT ON ADMISSION          Disposition Plan     Expected Discharge Date: 07/06/2023                   \90 total minutes on this patient, including 30 minutes of critical care    Joel Crouch, DO  Hospitalist Service  [facility]  Securely message with [employer] (more info)  Text page via [employer] Paging/Directory   ______________________________________________________________________    Interval History   Agitated overnight, 1.2 mcg/hr precedex. Overall confused and moving all extremities. Still on oxymask. Urine output adequate    Physical Exam   Vital Signs: Temp: 99  F (37.2  C) Temp src: Axillary BP: (!) 130/93 Pulse: 86   Resp: 23 SpO2: 96 % O2 Device: Oxymask Oxygen Delivery: 8 LPM  Weight: 175 lbs 0 oz    General Appearance:  somnolent, awakens to touch. Grunting speech, moves all " extremities  Respiratory: clear bilaterally  Cardiovascular: regular  no murmur. No edema  GI: distended, tender to light touch. No rebound or guarding  Skin: no rashes or lesions grossly    Other:  CN grossly intact, RAMIREZ            Medical Decision Making       90 MINUTES SPENT BY ME on the date of service doing chart review, history, exam, documentation & further activities per the note.      Data     I have personally reviewed the following data over the past 24 hrs:    13.3 (H)  \   13.5   / 175     140 110 (H) 7.7 /  91   3.8 21 (L) 0.80 \       ALT: 47 AST: 38 AP: 59 TBILI: 0.6   ALB: 2.9 (L) TOT PROTEIN: 5.5 (L) LIPASE: N/A       Imaging results reviewed over the past 24 hrs:   Recent Results (from the past 24 hour(s))   CT Head w/o Contrast    Narrative    EXAM: CT HEAD W/O CONTRAST  LOCATION: [facility]  DATE: 7/4/2023    INDICATION: Seizure, ETOH/  COMPARISON: Head CT 09/05/2016.  TECHNIQUE: Routine CT Head without IV contrast. Multiplanar reformats. Dose reduction techniques were used.    FINDINGS:  INTRACRANIAL CONTENTS: No intracranial hemorrhage, extraaxial collection, or mass effect. No CT evidence of acute infarct. Normal parenchymal attenuation. Normal ventricles and sulci.     VISUALIZED ORBITS/SINUSES/MASTOIDS: No intraorbital abnormality. No paranasal sinus mucosal disease. No middle ear or mastoid effusion.    BONES/SOFT TISSUES: No acute abnormality.      Impression    IMPRESSION:  1.  Normal head CT.

## 2023-07-06 ENCOUNTER — APPOINTMENT (OUTPATIENT)
Dept: GENERAL RADIOLOGY | Facility: CLINIC | Age: 43
DRG: 896 | End: 2023-07-06
Attending: STUDENT IN AN ORGANIZED HEALTH CARE EDUCATION/TRAINING PROGRAM
Payer: COMMERCIAL

## 2023-07-06 LAB
ALBUMIN SERPL BCG-MCNC: 2.7 G/DL (ref 3.5–5.2)
ALP SERPL-CCNC: 58 U/L (ref 40–129)
ALT SERPL W P-5'-P-CCNC: 32 U/L (ref 0–70)
AMPHETAMINES UR QL SCN: ABNORMAL
ANION GAP SERPL CALCULATED.3IONS-SCNC: 11 MMOL/L (ref 7–15)
AST SERPL W P-5'-P-CCNC: 29 U/L (ref 0–45)
BARBITURATES UR QL SCN: ABNORMAL
BASOPHILS # BLD AUTO: 0 10E3/UL (ref 0–0.2)
BASOPHILS NFR BLD AUTO: 0 %
BENZODIAZ UR QL SCN: ABNORMAL
BILIRUB SERPL-MCNC: 0.7 MG/DL
BUN SERPL-MCNC: 6.5 MG/DL (ref 6–20)
BZE UR QL SCN: ABNORMAL
CALCIUM SERPL-MCNC: 8 MG/DL (ref 8.6–10)
CANNABINOIDS UR QL SCN: ABNORMAL
CHLORIDE SERPL-SCNC: 108 MMOL/L (ref 98–107)
CREAT SERPL-MCNC: 0.69 MG/DL (ref 0.67–1.17)
DEPRECATED HCO3 PLAS-SCNC: 20 MMOL/L (ref 22–29)
EOSINOPHIL # BLD AUTO: 0 10E3/UL (ref 0–0.7)
EOSINOPHIL NFR BLD AUTO: 0 %
ERYTHROCYTE [DISTWIDTH] IN BLOOD BY AUTOMATED COUNT: 12.9 % (ref 10–15)
GFR SERPL CREATININE-BSD FRML MDRD: >90 ML/MIN/1.73M2
GLUCOSE BLDC GLUCOMTR-MCNC: 103 MG/DL (ref 70–99)
GLUCOSE BLDC GLUCOMTR-MCNC: 121 MG/DL (ref 70–99)
GLUCOSE BLDC GLUCOMTR-MCNC: 123 MG/DL (ref 70–99)
GLUCOSE BLDC GLUCOMTR-MCNC: 63 MG/DL (ref 70–99)
GLUCOSE BLDC GLUCOMTR-MCNC: 97 MG/DL (ref 70–99)
GLUCOSE SERPL-MCNC: 73 MG/DL (ref 70–99)
HCT VFR BLD AUTO: 36.3 % (ref 40–53)
HGB BLD-MCNC: 12.1 G/DL (ref 13.3–17.7)
IMM GRANULOCYTES # BLD: 0.1 10E3/UL
IMM GRANULOCYTES NFR BLD: 1 %
LYMPHOCYTES # BLD AUTO: 0.8 10E3/UL (ref 0.8–5.3)
LYMPHOCYTES NFR BLD AUTO: 9 %
MCH RBC QN AUTO: 29.2 PG (ref 26.5–33)
MCHC RBC AUTO-ENTMCNC: 33.3 G/DL (ref 31.5–36.5)
MCV RBC AUTO: 88 FL (ref 78–100)
MONOCYTES # BLD AUTO: 0.9 10E3/UL (ref 0–1.3)
MONOCYTES NFR BLD AUTO: 10 %
MRSA DNA SPEC QL NAA+PROBE: NEGATIVE
NEUTROPHILS # BLD AUTO: 6.7 10E3/UL (ref 1.6–8.3)
NEUTROPHILS NFR BLD AUTO: 80 %
NRBC # BLD AUTO: 0 10E3/UL
NRBC BLD AUTO-RTO: 0 /100
OPIATES UR QL SCN: ABNORMAL
PCP QUAL URINE (ROCHE): ABNORMAL
PLATELET # BLD AUTO: 144 10E3/UL (ref 150–450)
POTASSIUM SERPL-SCNC: 3.7 MMOL/L (ref 3.4–5.3)
PROT SERPL-MCNC: 5.5 G/DL (ref 6.4–8.3)
RBC # BLD AUTO: 4.14 10E6/UL (ref 4.4–5.9)
SA TARGET DNA: POSITIVE
SODIUM SERPL-SCNC: 139 MMOL/L (ref 136–145)
WBC # BLD AUTO: 8.4 10E3/UL (ref 4–11)

## 2023-07-06 PROCEDURE — 87641 MR-STAPH DNA AMP PROBE: CPT | Performed by: STUDENT IN AN ORGANIZED HEALTH CARE EDUCATION/TRAINING PROGRAM

## 2023-07-06 PROCEDURE — 258N000001 HC RX 258: Performed by: INTERNAL MEDICINE

## 2023-07-06 PROCEDURE — 71045 X-RAY EXAM CHEST 1 VIEW: CPT

## 2023-07-06 PROCEDURE — 250N000011 HC RX IP 250 OP 636: Performed by: HOSPITALIST

## 2023-07-06 PROCEDURE — 258N000003 HC RX IP 258 OP 636: Performed by: STUDENT IN AN ORGANIZED HEALTH CARE EDUCATION/TRAINING PROGRAM

## 2023-07-06 PROCEDURE — 200N000001 HC R&B ICU

## 2023-07-06 PROCEDURE — 94640 AIRWAY INHALATION TREATMENT: CPT

## 2023-07-06 PROCEDURE — 85025 COMPLETE CBC W/AUTO DIFF WBC: CPT | Performed by: HOSPITALIST

## 2023-07-06 PROCEDURE — 250N000011 HC RX IP 250 OP 636: Performed by: INTERNAL MEDICINE

## 2023-07-06 PROCEDURE — 258N000003 HC RX IP 258 OP 636: Performed by: INTERNAL MEDICINE

## 2023-07-06 PROCEDURE — 258N000003 HC RX IP 258 OP 636: Performed by: HOSPITALIST

## 2023-07-06 PROCEDURE — 999N000127 HC STATISTIC PERIPHERAL IV START W US GUIDANCE

## 2023-07-06 PROCEDURE — 80053 COMPREHEN METABOLIC PANEL: CPT | Performed by: HOSPITALIST

## 2023-07-06 PROCEDURE — 36415 COLL VENOUS BLD VENIPUNCTURE: CPT | Performed by: HOSPITALIST

## 2023-07-06 PROCEDURE — C9113 INJ PANTOPRAZOLE SODIUM, VIA: HCPCS | Performed by: INTERNAL MEDICINE

## 2023-07-06 PROCEDURE — 250N000009 HC RX 250: Performed by: STUDENT IN AN ORGANIZED HEALTH CARE EDUCATION/TRAINING PROGRAM

## 2023-07-06 PROCEDURE — 99291 CRITICAL CARE FIRST HOUR: CPT | Performed by: STUDENT IN AN ORGANIZED HEALTH CARE EDUCATION/TRAINING PROGRAM

## 2023-07-06 PROCEDURE — 250N000009 HC RX 250: Performed by: HOSPITALIST

## 2023-07-06 PROCEDURE — 250N000011 HC RX IP 250 OP 636: Performed by: STUDENT IN AN ORGANIZED HEALTH CARE EDUCATION/TRAINING PROGRAM

## 2023-07-06 RX ORDER — PHENOBARBITAL SODIUM 130 MG/ML
130 INJECTION, SOLUTION INTRAMUSCULAR; INTRAVENOUS ONCE
Status: COMPLETED | OUTPATIENT
Start: 2023-07-06 | End: 2023-07-06

## 2023-07-06 RX ORDER — DEXTROSE, SODIUM CHLORIDE, SODIUM LACTATE, POTASSIUM CHLORIDE, AND CALCIUM CHLORIDE 5; .6; .31; .03; .02 G/100ML; G/100ML; G/100ML; G/100ML; G/100ML
INJECTION, SOLUTION INTRAVENOUS CONTINUOUS
Status: DISCONTINUED | OUTPATIENT
Start: 2023-07-06 | End: 2023-07-09

## 2023-07-06 RX ORDER — IPRATROPIUM BROMIDE AND ALBUTEROL SULFATE 2.5; .5 MG/3ML; MG/3ML
3 SOLUTION RESPIRATORY (INHALATION) EVERY 4 HOURS PRN
Status: DISCONTINUED | OUTPATIENT
Start: 2023-07-06 | End: 2023-07-14 | Stop reason: HOSPADM

## 2023-07-06 RX ORDER — FLUMAZENIL 0.1 MG/ML
0.2 INJECTION, SOLUTION INTRAVENOUS
Status: DISCONTINUED | OUTPATIENT
Start: 2023-07-06 | End: 2023-07-14 | Stop reason: HOSPADM

## 2023-07-06 RX ORDER — DIAZEPAM 10 MG/2ML
5-10 INJECTION, SOLUTION INTRAMUSCULAR; INTRAVENOUS EVERY 30 MIN PRN
Status: DISCONTINUED | OUTPATIENT
Start: 2023-07-06 | End: 2023-07-10

## 2023-07-06 RX ORDER — CEFTRIAXONE 2 G/1
2 INJECTION, POWDER, FOR SOLUTION INTRAMUSCULAR; INTRAVENOUS EVERY 24 HOURS
Status: DISCONTINUED | OUTPATIENT
Start: 2023-07-06 | End: 2023-07-06

## 2023-07-06 RX ORDER — DIAZEPAM 10 MG/2ML
10 INJECTION, SOLUTION INTRAMUSCULAR; INTRAVENOUS ONCE
Status: COMPLETED | OUTPATIENT
Start: 2023-07-06 | End: 2023-07-06

## 2023-07-06 RX ORDER — DIAZEPAM 5 MG
10 TABLET ORAL EVERY 30 MIN PRN
Status: DISCONTINUED | OUTPATIENT
Start: 2023-07-06 | End: 2023-07-10

## 2023-07-06 RX ORDER — CEFEPIME HYDROCHLORIDE 2 G/1
2 INJECTION, POWDER, FOR SOLUTION INTRAVENOUS EVERY 8 HOURS
Status: DISCONTINUED | OUTPATIENT
Start: 2023-07-06 | End: 2023-07-10

## 2023-07-06 RX ADMIN — PANTOPRAZOLE SODIUM 40 MG: 40 INJECTION, POWDER, FOR SOLUTION INTRAVENOUS at 19:41

## 2023-07-06 RX ADMIN — DEXMEDETOMIDINE HYDROCHLORIDE 1.2 MCG/KG/HR: 400 INJECTION INTRAVENOUS at 08:03

## 2023-07-06 RX ADMIN — HALOPERIDOL LACTATE 5 MG: 5 INJECTION, SOLUTION INTRAMUSCULAR at 03:51

## 2023-07-06 RX ADMIN — PANTOPRAZOLE SODIUM 40 MG: 40 INJECTION, POWDER, FOR SOLUTION INTRAVENOUS at 07:53

## 2023-07-06 RX ADMIN — HALOPERIDOL LACTATE 5 MG: 5 INJECTION, SOLUTION INTRAMUSCULAR at 18:55

## 2023-07-06 RX ADMIN — DEXMEDETOMIDINE HYDROCHLORIDE 1 MCG/KG/HR: 400 INJECTION INTRAVENOUS at 12:09

## 2023-07-06 RX ADMIN — HYDROMORPHONE HYDROCHLORIDE 0.5 MG: 1 INJECTION, SOLUTION INTRAMUSCULAR; INTRAVENOUS; SUBCUTANEOUS at 22:37

## 2023-07-06 RX ADMIN — DIAZEPAM 10 MG: 5 INJECTION INTRAMUSCULAR; INTRAVENOUS at 19:35

## 2023-07-06 RX ADMIN — PHENOBARBITAL SODIUM 130 MG: 130 INJECTION INTRAMUSCULAR; INTRAVENOUS at 10:02

## 2023-07-06 RX ADMIN — CEFEPIME HYDROCHLORIDE 2 G: 2 INJECTION, POWDER, FOR SOLUTION INTRAVENOUS at 18:07

## 2023-07-06 RX ADMIN — DIAZEPAM 10 MG: 5 INJECTION INTRAMUSCULAR; INTRAVENOUS at 07:02

## 2023-07-06 RX ADMIN — DIAZEPAM 10 MG: 5 INJECTION INTRAMUSCULAR; INTRAVENOUS at 20:18

## 2023-07-06 RX ADMIN — DIAZEPAM 10 MG: 5 INJECTION INTRAMUSCULAR; INTRAVENOUS at 22:02

## 2023-07-06 RX ADMIN — LORAZEPAM 2 MG: 2 INJECTION INTRAMUSCULAR; INTRAVENOUS at 02:55

## 2023-07-06 RX ADMIN — LORAZEPAM 2 MG: 2 INJECTION INTRAMUSCULAR; INTRAVENOUS at 04:24

## 2023-07-06 RX ADMIN — ENOXAPARIN SODIUM 40 MG: 40 INJECTION SUBCUTANEOUS at 16:19

## 2023-07-06 RX ADMIN — DEXMEDETOMIDINE HYDROCHLORIDE 1.2 MCG/KG/HR: 400 INJECTION INTRAVENOUS at 21:58

## 2023-07-06 RX ADMIN — DEXMEDETOMIDINE HYDROCHLORIDE 1 MCG/KG/HR: 400 INJECTION INTRAVENOUS at 16:38

## 2023-07-06 RX ADMIN — IPRATROPIUM BROMIDE AND ALBUTEROL SULFATE 3 ML: .5; 3 SOLUTION RESPIRATORY (INHALATION) at 16:10

## 2023-07-06 RX ADMIN — LORAZEPAM 2 MG: 2 INJECTION INTRAMUSCULAR; INTRAVENOUS at 01:26

## 2023-07-06 RX ADMIN — FOLIC ACID 1 MG: 5 INJECTION, SOLUTION INTRAMUSCULAR; INTRAVENOUS; SUBCUTANEOUS at 07:53

## 2023-07-06 RX ADMIN — LORAZEPAM 1 MG: 2 INJECTION INTRAMUSCULAR; INTRAVENOUS at 02:28

## 2023-07-06 RX ADMIN — DIAZEPAM 10 MG: 5 INJECTION INTRAMUSCULAR; INTRAVENOUS at 18:25

## 2023-07-06 RX ADMIN — DIAZEPAM 10 MG: 5 INJECTION INTRAMUSCULAR; INTRAVENOUS at 05:20

## 2023-07-06 RX ADMIN — SODIUM CHLORIDE, SODIUM LACTATE, POTASSIUM CHLORIDE, CALCIUM CHLORIDE AND DEXTROSE MONOHYDRATE: 5; 600; 310; 30; 20 INJECTION, SOLUTION INTRAVENOUS at 10:16

## 2023-07-06 RX ADMIN — DIAZEPAM 10 MG: 5 INJECTION INTRAMUSCULAR; INTRAVENOUS at 10:15

## 2023-07-06 RX ADMIN — DEXTROSE MONOHYDRATE 50 ML: 25 INJECTION, SOLUTION INTRAVENOUS at 11:14

## 2023-07-06 RX ADMIN — IPRATROPIUM BROMIDE AND ALBUTEROL SULFATE 3 ML: .5; 3 SOLUTION RESPIRATORY (INHALATION) at 22:22

## 2023-07-06 RX ADMIN — DEXMEDETOMIDINE HYDROCHLORIDE 1.2 MCG/KG/HR: 400 INJECTION INTRAVENOUS at 03:40

## 2023-07-06 RX ADMIN — VANCOMYCIN HYDROCHLORIDE 2000 MG: 10 INJECTION, POWDER, LYOPHILIZED, FOR SOLUTION INTRAVENOUS at 19:04

## 2023-07-06 RX ADMIN — LORAZEPAM 2 MG: 2 INJECTION INTRAMUSCULAR; INTRAVENOUS at 01:55

## 2023-07-06 RX ADMIN — LORAZEPAM 2 MG: 2 INJECTION INTRAMUSCULAR; INTRAVENOUS at 03:31

## 2023-07-06 RX ADMIN — THIAMINE HYDROCHLORIDE 250 MG: 100 INJECTION, SOLUTION INTRAMUSCULAR; INTRAVENOUS at 10:15

## 2023-07-06 RX ADMIN — SODIUM CHLORIDE, SODIUM LACTATE, POTASSIUM CHLORIDE, CALCIUM CHLORIDE AND DEXTROSE MONOHYDRATE: 5; 600; 310; 30; 20 INJECTION, SOLUTION INTRAVENOUS at 20:24

## 2023-07-06 RX ADMIN — LORAZEPAM 2 MG: 2 INJECTION INTRAMUSCULAR; INTRAVENOUS at 05:01

## 2023-07-06 RX ADMIN — SODIUM CHLORIDE: 9 INJECTION, SOLUTION INTRAVENOUS at 05:59

## 2023-07-06 RX ADMIN — DIAZEPAM 5 MG: 5 INJECTION INTRAMUSCULAR; INTRAVENOUS at 16:38

## 2023-07-06 ASSESSMENT — ACTIVITIES OF DAILY LIVING (ADL)
ADLS_ACUITY_SCORE: 47
ADLS_ACUITY_SCORE: 47
ADLS_ACUITY_SCORE: 44
ADLS_ACUITY_SCORE: 47
ADLS_ACUITY_SCORE: 44
ADLS_ACUITY_SCORE: 47
ADLS_ACUITY_SCORE: 47
ADLS_ACUITY_SCORE: 44

## 2023-07-06 NOTE — PROGRESS NOTES
[facility]    Medicine Progress Note - Hospitalist Service    Date of Admission:  7/4/2023    Assessment & Plan    Dariusz Ferreira is a 42 year old male admitted on 7/4/2023. He presents with alcohol withdrawal complicated by withdrawal seizure.    Addendum: febrile. Will get cxr, mrsa nasal swab. Start cefepime and vanco. Has penicillin allergy. Please stop vanco if nasal swab negative.     Alcohol withdrawal with alcohol withdrawal seizure  Alcohol use disorder  Longstanding hx alcohol use disorder. Has been to treatment 5+ times in the past. States binge drinks, has been drinking heavily last 2 weeks, stopped Saturday, withdrawal started Sunday and abdominal pain today. In ED tachy 100s, hypertensive, afebrile. WBC 16.5, hgb 19.1 (hemoconcentration). Etoh negative. Given phenobarbital 260 mg x 1 in ED. EKG ST.   Did suffer a generalized tonic clonic seizure the day of admission, and transferred to the ICU with initiation of precedex infusion.  * second dose of phenobarb 130mg ordered 7/6  plan  - CIWA with prn diazepam  - continue precedex  - vitamins including IV thamine  - will need chemical dependence eval, but reportedly declined immediately at admission    Acute hypoxemic respiratory failure, improving  Likely secondary to sedation, but we will evaluate for signs of developing pneumonia. Overall SIRS criteria are improving  Plan  - CXR and low threshold for abx given his aspiration risk  - aspiration precautions  - monitor for fever    Toxic metabolic encephalopathy  Due to medication for withdrawal and likely underlying DTs  Plan  - wean sedatives as tolerates     Pancreatitis  Transaminitis/ elevate bilirubin  Pancreatic tail lesion  Alcoholic gastritis  With epigastric pain. Lipase 1083. AST/ /109. TB 1.5. acute LFT abnormalities 2/2 Etoh.   CT with acute interstitial pancreatitis  - SIRS criteria improving and no overt organ  Failure yet, probably would characterize as  "moderate pancreatitis. ICU needed primarily for alcohol withdrawal  - NPO. Try to wean sedation today and encourage some oral intake. May need to consider enteral nutrition in the next few days if unable to take orals  - IV fluids to remain at 100  - pantoprazole 40 mg IV BID  - prn analgesics, antiemetics  - will need follow-up imaging of the tail lesion, will need to discuss with patient when able     Hypokalemia  K 3.3 in ED.   - replace per protocol     Mood disorder  [needs rec- bupropion 300 mg daily, fluvoxamine 200 mg at HS, lamotrigine 300 mg daily]  - resume meds when able to take PO     Hx methamphetamine use disorder  Has had MSSA abscesses 2/2 IV meth use in the past. Denies meth use for several years.           Diet: NPO for Medical/Clinical Reasons Except for: Meds, Ice Chips    DVT Prophylaxis: Enoxaparin (Lovenox) SQ and Pneumatic Compression Devices  Martin Catheter: Not present  Lines: None     Cardiac Monitoring: ACTIVE order. Indication: ICU  Code Status: Full Code      Clinically Significant Risk Factors              # Hypoalbuminemia: Lowest albumin = 2.7 g/dL at 7/6/2023  4:56 AM, will monitor as appropriate            # Overweight: Estimated body mass index is 29.32 kg/m  as calculated from the following:    Height as of this encounter: 1.702 m (5' 7\").    Weight as of this encounter: 84.9 kg (187 lb 2.7 oz)., PRESENT ON ADMISSION          Disposition Plan      Expected Discharge Date: 07/08/2023                   35 minutes of critical care, management of precedex, dosing phenobarb, management of severe etoh withdrawal.    Wilbur Gatica MD  Hospitalist Service  [facility]  Securely message with [employer] (more info)  Text page via [facility] Paging/Directory   ______________________________________________________________________    Interval History   Agitated overnight, 1.1 mcg/hr precedex. Overall confused and moving all extremities. Still on oxymask. Urine output " adequate. Remains nonverbal, altered    Physical Exam   Vital Signs: Temp: 99.1  F (37.3  C) Temp src: Axillary BP: 127/86 Pulse: 91   Resp: (!) 32 SpO2: 95 % O2 Device: Oxi Plus Oxygen Delivery: 3 LPM  Weight: 187 lbs 2.73 oz    General Appearance:  somnolent, awakens to touch. Grunting speech, moves all extremities  Respiratory: clear bilaterally  Cardiovascular: regular  no murmur. No edema  GI: distended, tender to light touch. No rebound or guarding  Skin: no rashes or lesions grossly    Other:  CN grossly intact, RAMIREZ            Medical Decision Making       35 MINUTES SPENT BY ME on the date of service doing chart review, history, exam, documentation & further activities per the note.    35 minutes of critical care, management of precedex, dosing phenobarb, management of severe etoh withdrawal.    Data     I have personally reviewed the following data over the past 24 hrs:    8.4  \   12.1 (L)   / 144 (L)     139 108 (H) 6.5 /  73   3.7 20 (L) 0.69 \       ALT: 32 AST: 29 AP: 58 TBILI: 0.7   ALB: 2.7 (L) TOT PROTEIN: 5.5 (L) LIPASE: N/A       Imaging results reviewed over the past 24 hrs:   Recent Results (from the past 24 hour(s))   XR Chest Port 1 View    Narrative    CHEST ONE VIEW  7/5/2023 8:27 AM     HISTORY: Hypoxemia.    COMPARISON: March 10, 2014      Impression    IMPRESSION: Low lung volumes. Question some minimal bibasilar  atelectasis vs. less likely infiltrate.     MILES SY MD         SYSTEM ID:  [id number]

## 2023-07-06 NOTE — PLAN OF CARE
Neuro: Oriented to self at times. Dex gtt for agitation. Haldol given. Ativan changed to valium.  Resp: 3L oxy plus.  Cardiac: SR, BP within parameters  : Primofit for urine  GI: No BM overnight, abd rounded  Lines: PIV x2- NS @ 150, Dex @ 1.2  Plan: Continue to manage withdrawal symptoms.

## 2023-07-06 NOTE — PROGRESS NOTES
The patient is alert to self. He is more restless/having more withdrawal symptoms today. Precedex drip for agitation/withdrawal symptoms. Ativan given IV per CIWA scoring and PRN orders. Haldol 5mg IV given x1 dose for agitation as well. The patient does not follow commands. He has soft wrist restraints to keep him from pulling IVs.   Lung sounds clear/diminished. 6L oxymask to keep O2 saturations>92%.   Sinus rhythm on monitor.   NPO.   External male urinary catheter in use. Small BM today.   Yuri Astudillo RN 8:02 PM 07/05/23

## 2023-07-06 NOTE — PHARMACY-VANCOMYCIN DOSING SERVICE
Pharmacy Vancomycin Initial Note  Date of Service 2023  Patient's  1980  42 year old, male    Indication: Healthcare-Associated Pneumonia    Current estimated CrCl = Estimated Creatinine Clearance: 146.7 mL/min (based on SCr of 0.69 mg/dL).    Creatinine for last 3 days  2023: 12:54 AM Creatinine 0.95 mg/dL;  8:19 AM Creatinine 0.83 mg/dL  2023:  5:01 AM Creatinine 0.80 mg/dL  2023:  4:56 AM Creatinine 0.69 mg/dL    Recent Vancomycin Level(s) for last 3 days  No results found for requested labs within last 3 days.      Vancomycin IV Administrations (past 72 hours)      No vancomycin orders with administrations in past 72 hours.                Nephrotoxins and other renal medications (From now, onward)    None          Contrast Orders - past 72 hours (72h ago, onward)    Start     Dose/Rate Route Frequency Stop    23 0305  iopamidol (ISOVUE-370) solution 88 mL         88 mL Intravenous ONCE 23 0325          Biba Prediction of Planned Initial Vancomycin Regimen  Loading dose: 2000 mg at 18:00 2023.  Regimen: 1500 mg IV every 12 hours.  Start time: 17:59 on 2023  Exposure target: AUC24 (range)400-600 mg/L.hr   AUC24,ss: 554 mg/L.hr  Probability of AUC24 > 400: 80 %  Ctrough,ss: 16.2 mg/L  Probability of Ctrough,ss > 20: 35 %  Probability of nephrotoxicity (Lodise JF ): 12 %        Plan:  1. Start vancomycin 2000 mg IV once followed by 1500 mg IV q12h.   2. Vancomycin monitoring method: AUC  3. Vancomycin therapeutic monitoring goal: 400-600 mg*h/L  4. Pharmacy will check vancomycin levels as appropriate in 1-3 Days.    5. Serum creatinine levels will be ordered daily for the first week of therapy and at least twice weekly for subsequent weeks.      Leigha Justice RP

## 2023-07-07 ENCOUNTER — APPOINTMENT (OUTPATIENT)
Dept: GENERAL RADIOLOGY | Facility: CLINIC | Age: 43
DRG: 896 | End: 2023-07-07
Attending: INTERNAL MEDICINE
Payer: COMMERCIAL

## 2023-07-07 LAB
ANION GAP SERPL CALCULATED.3IONS-SCNC: 9 MMOL/L (ref 7–15)
BUN SERPL-MCNC: 4.3 MG/DL (ref 6–20)
CALCIUM SERPL-MCNC: 8.1 MG/DL (ref 8.6–10)
CHLORIDE SERPL-SCNC: 108 MMOL/L (ref 98–107)
CREAT SERPL-MCNC: 0.7 MG/DL (ref 0.67–1.17)
DEPRECATED HCO3 PLAS-SCNC: 22 MMOL/L (ref 22–29)
ERYTHROCYTE [DISTWIDTH] IN BLOOD BY AUTOMATED COUNT: 12.9 % (ref 10–15)
GFR SERPL CREATININE-BSD FRML MDRD: >90 ML/MIN/1.73M2
GLUCOSE BLDC GLUCOMTR-MCNC: 104 MG/DL (ref 70–99)
GLUCOSE BLDC GLUCOMTR-MCNC: 105 MG/DL (ref 70–99)
GLUCOSE BLDC GLUCOMTR-MCNC: 107 MG/DL (ref 70–99)
GLUCOSE BLDC GLUCOMTR-MCNC: 124 MG/DL (ref 70–99)
GLUCOSE BLDC GLUCOMTR-MCNC: 126 MG/DL (ref 70–99)
GLUCOSE BLDC GLUCOMTR-MCNC: 79 MG/DL (ref 70–99)
GLUCOSE BLDC GLUCOMTR-MCNC: 89 MG/DL (ref 70–99)
GLUCOSE SERPL-MCNC: 128 MG/DL (ref 70–99)
HCT VFR BLD AUTO: 32 % (ref 40–53)
HGB BLD-MCNC: 10.8 G/DL (ref 13.3–17.7)
MAGNESIUM SERPL-MCNC: 2 MG/DL (ref 1.7–2.3)
MCH RBC QN AUTO: 29.3 PG (ref 26.5–33)
MCHC RBC AUTO-ENTMCNC: 33.8 G/DL (ref 31.5–36.5)
MCV RBC AUTO: 87 FL (ref 78–100)
PHOSPHATE SERPL-MCNC: 1.5 MG/DL (ref 2.5–4.5)
PHOSPHATE SERPL-MCNC: 1.8 MG/DL (ref 2.5–4.5)
PLATELET # BLD AUTO: 178 10E3/UL (ref 150–450)
POTASSIUM SERPL-SCNC: 3 MMOL/L (ref 3.4–5.3)
POTASSIUM SERPL-SCNC: 3.6 MMOL/L (ref 3.4–5.3)
RBC # BLD AUTO: 3.69 10E6/UL (ref 4.4–5.9)
SODIUM SERPL-SCNC: 139 MMOL/L (ref 136–145)
WBC # BLD AUTO: 6.2 10E3/UL (ref 4–11)

## 2023-07-07 PROCEDURE — 94640 AIRWAY INHALATION TREATMENT: CPT | Mod: 76

## 2023-07-07 PROCEDURE — 250N000011 HC RX IP 250 OP 636: Performed by: HOSPITALIST

## 2023-07-07 PROCEDURE — 200N000001 HC R&B ICU

## 2023-07-07 PROCEDURE — 250N000011 HC RX IP 250 OP 636: Mod: JZ | Performed by: INTERNAL MEDICINE

## 2023-07-07 PROCEDURE — 250N000009 HC RX 250: Performed by: HOSPITALIST

## 2023-07-07 PROCEDURE — 80048 BASIC METABOLIC PNL TOTAL CA: CPT | Performed by: STUDENT IN AN ORGANIZED HEALTH CARE EDUCATION/TRAINING PROGRAM

## 2023-07-07 PROCEDURE — 84100 ASSAY OF PHOSPHORUS: CPT | Performed by: STUDENT IN AN ORGANIZED HEALTH CARE EDUCATION/TRAINING PROGRAM

## 2023-07-07 PROCEDURE — C9113 INJ PANTOPRAZOLE SODIUM, VIA: HCPCS | Mod: JZ | Performed by: INTERNAL MEDICINE

## 2023-07-07 PROCEDURE — 250N000009 HC RX 250: Performed by: STUDENT IN AN ORGANIZED HEALTH CARE EDUCATION/TRAINING PROGRAM

## 2023-07-07 PROCEDURE — 99291 CRITICAL CARE FIRST HOUR: CPT | Performed by: STUDENT IN AN ORGANIZED HEALTH CARE EDUCATION/TRAINING PROGRAM

## 2023-07-07 PROCEDURE — 84100 ASSAY OF PHOSPHORUS: CPT | Performed by: INTERNAL MEDICINE

## 2023-07-07 PROCEDURE — 250N000011 HC RX IP 250 OP 636: Mod: JZ | Performed by: STUDENT IN AN ORGANIZED HEALTH CARE EDUCATION/TRAINING PROGRAM

## 2023-07-07 PROCEDURE — 36415 COLL VENOUS BLD VENIPUNCTURE: CPT | Performed by: STUDENT IN AN ORGANIZED HEALTH CARE EDUCATION/TRAINING PROGRAM

## 2023-07-07 PROCEDURE — 999N000040 HC STATISTIC CONSULT NO CHARGE VASC ACCESS

## 2023-07-07 PROCEDURE — 71045 X-RAY EXAM CHEST 1 VIEW: CPT

## 2023-07-07 PROCEDURE — 84132 ASSAY OF SERUM POTASSIUM: CPT | Performed by: STUDENT IN AN ORGANIZED HEALTH CARE EDUCATION/TRAINING PROGRAM

## 2023-07-07 PROCEDURE — 83735 ASSAY OF MAGNESIUM: CPT | Performed by: STUDENT IN AN ORGANIZED HEALTH CARE EDUCATION/TRAINING PROGRAM

## 2023-07-07 PROCEDURE — 85049 AUTOMATED PLATELET COUNT: CPT | Performed by: STUDENT IN AN ORGANIZED HEALTH CARE EDUCATION/TRAINING PROGRAM

## 2023-07-07 PROCEDURE — 258N000003 HC RX IP 258 OP 636: Performed by: HOSPITALIST

## 2023-07-07 PROCEDURE — 36569 INSJ PICC 5 YR+ W/O IMAGING: CPT

## 2023-07-07 PROCEDURE — 999N000157 HC STATISTIC RCP TIME EA 10 MIN

## 2023-07-07 PROCEDURE — 272N000433 HC KIT CATH IV 18 OR 20G CM, POWERGLIDE W MAX BARRIER

## 2023-07-07 PROCEDURE — 258N000003 HC RX IP 258 OP 636: Performed by: STUDENT IN AN ORGANIZED HEALTH CARE EDUCATION/TRAINING PROGRAM

## 2023-07-07 PROCEDURE — 94640 AIRWAY INHALATION TREATMENT: CPT

## 2023-07-07 RX ORDER — FUROSEMIDE 10 MG/ML
40 INJECTION INTRAMUSCULAR; INTRAVENOUS ONCE
Status: COMPLETED | OUTPATIENT
Start: 2023-07-07 | End: 2023-07-07

## 2023-07-07 RX ORDER — POTASSIUM CHLORIDE 7.45 MG/ML
10 INJECTION INTRAVENOUS
Status: COMPLETED | OUTPATIENT
Start: 2023-07-07 | End: 2023-07-07

## 2023-07-07 RX ADMIN — SODIUM CHLORIDE, SODIUM LACTATE, POTASSIUM CHLORIDE, CALCIUM CHLORIDE AND DEXTROSE MONOHYDRATE: 5; 600; 310; 30; 20 INJECTION, SOLUTION INTRAVENOUS at 05:52

## 2023-07-07 RX ADMIN — DIAZEPAM 5 MG: 5 INJECTION INTRAMUSCULAR; INTRAVENOUS at 17:37

## 2023-07-07 RX ADMIN — DEXMEDETOMIDINE HYDROCHLORIDE 1.2 MCG/KG/HR: 400 INJECTION INTRAVENOUS at 10:00

## 2023-07-07 RX ADMIN — DIAZEPAM 10 MG: 5 INJECTION INTRAMUSCULAR; INTRAVENOUS at 21:31

## 2023-07-07 RX ADMIN — ONDANSETRON 4 MG: 2 INJECTION INTRAMUSCULAR; INTRAVENOUS at 20:27

## 2023-07-07 RX ADMIN — POTASSIUM CHLORIDE 10 MEQ: 7.46 INJECTION, SOLUTION INTRAVENOUS at 11:57

## 2023-07-07 RX ADMIN — HYDROMORPHONE HYDROCHLORIDE 0.5 MG: 1 INJECTION, SOLUTION INTRAMUSCULAR; INTRAVENOUS; SUBCUTANEOUS at 17:52

## 2023-07-07 RX ADMIN — SODIUM PHOSPHATE, MONOBASIC, MONOHYDRATE AND SODIUM PHOSPHATE, DIBASIC, ANHYDROUS 9 MMOL: 142; 276 INJECTION, SOLUTION INTRAVENOUS at 13:30

## 2023-07-07 RX ADMIN — HYDROMORPHONE HYDROCHLORIDE 0.5 MG: 1 INJECTION, SOLUTION INTRAMUSCULAR; INTRAVENOUS; SUBCUTANEOUS at 10:29

## 2023-07-07 RX ADMIN — DEXMEDETOMIDINE HYDROCHLORIDE 1.2 MCG/KG/HR: 400 INJECTION INTRAVENOUS at 01:15

## 2023-07-07 RX ADMIN — FUROSEMIDE 40 MG: 10 INJECTION, SOLUTION INTRAVENOUS at 15:19

## 2023-07-07 RX ADMIN — DEXMEDETOMIDINE HYDROCHLORIDE 1.2 MCG/KG/HR: 400 INJECTION INTRAVENOUS at 05:52

## 2023-07-07 RX ADMIN — PANTOPRAZOLE SODIUM 40 MG: 40 INJECTION, POWDER, FOR SOLUTION INTRAVENOUS at 08:42

## 2023-07-07 RX ADMIN — CEFEPIME HYDROCHLORIDE 2 G: 2 INJECTION, POWDER, FOR SOLUTION INTRAVENOUS at 12:41

## 2023-07-07 RX ADMIN — FOLIC ACID 1 MG: 5 INJECTION, SOLUTION INTRAMUSCULAR; INTRAVENOUS; SUBCUTANEOUS at 08:45

## 2023-07-07 RX ADMIN — CEFEPIME HYDROCHLORIDE 2 G: 2 INJECTION, POWDER, FOR SOLUTION INTRAVENOUS at 21:21

## 2023-07-07 RX ADMIN — THIAMINE HYDROCHLORIDE 250 MG: 100 INJECTION, SOLUTION INTRAMUSCULAR; INTRAVENOUS at 10:00

## 2023-07-07 RX ADMIN — HYDROMORPHONE HYDROCHLORIDE 0.5 MG: 1 INJECTION, SOLUTION INTRAMUSCULAR; INTRAVENOUS; SUBCUTANEOUS at 21:31

## 2023-07-07 RX ADMIN — DEXMEDETOMIDINE HYDROCHLORIDE 1.2 MCG/KG/HR: 400 INJECTION INTRAVENOUS at 21:48

## 2023-07-07 RX ADMIN — POTASSIUM CHLORIDE 10 MEQ: 7.46 INJECTION, SOLUTION INTRAVENOUS at 12:59

## 2023-07-07 RX ADMIN — POTASSIUM CHLORIDE 10 MEQ: 7.46 INJECTION, SOLUTION INTRAVENOUS at 08:42

## 2023-07-07 RX ADMIN — SODIUM PHOSPHATE, MONOBASIC, MONOHYDRATE AND SODIUM PHOSPHATE, DIBASIC, ANHYDROUS 9 MMOL: 142; 276 INJECTION, SOLUTION INTRAVENOUS at 14:01

## 2023-07-07 RX ADMIN — PANTOPRAZOLE SODIUM 40 MG: 40 INJECTION, POWDER, FOR SOLUTION INTRAVENOUS at 20:27

## 2023-07-07 RX ADMIN — IPRATROPIUM BROMIDE AND ALBUTEROL SULFATE 3 ML: .5; 3 SOLUTION RESPIRATORY (INHALATION) at 15:28

## 2023-07-07 RX ADMIN — DIAZEPAM 10 MG: 5 INJECTION INTRAMUSCULAR; INTRAVENOUS at 02:40

## 2023-07-07 RX ADMIN — VALPROATE SODIUM 500 MG: 100 INJECTION, SOLUTION INTRAVENOUS at 12:22

## 2023-07-07 RX ADMIN — DEXMEDETOMIDINE HYDROCHLORIDE 1.2 MCG/KG/HR: 400 INJECTION INTRAVENOUS at 09:19

## 2023-07-07 RX ADMIN — POTASSIUM CHLORIDE 10 MEQ: 7.46 INJECTION, SOLUTION INTRAVENOUS at 10:45

## 2023-07-07 RX ADMIN — DIAZEPAM 10 MG: 5 INJECTION INTRAMUSCULAR; INTRAVENOUS at 20:26

## 2023-07-07 RX ADMIN — DEXMEDETOMIDINE HYDROCHLORIDE 0.8 MCG/KG/HR: 400 INJECTION INTRAVENOUS at 14:48

## 2023-07-07 RX ADMIN — POTASSIUM CHLORIDE 10 MEQ: 7.46 INJECTION, SOLUTION INTRAVENOUS at 14:01

## 2023-07-07 RX ADMIN — SODIUM PHOSPHATE, MONOBASIC, MONOHYDRATE AND SODIUM PHOSPHATE, DIBASIC, ANHYDROUS 9 MMOL: 142; 276 INJECTION, SOLUTION INTRAVENOUS at 23:40

## 2023-07-07 RX ADMIN — HYDROMORPHONE HYDROCHLORIDE 0.5 MG: 1 INJECTION, SOLUTION INTRAMUSCULAR; INTRAVENOUS; SUBCUTANEOUS at 03:22

## 2023-07-07 RX ADMIN — HYDROMORPHONE HYDROCHLORIDE 0.5 MG: 1 INJECTION, SOLUTION INTRAMUSCULAR; INTRAVENOUS; SUBCUTANEOUS at 07:07

## 2023-07-07 RX ADMIN — LIDOCAINE HYDROCHLORIDE 2 ML: 10 INJECTION, SOLUTION EPIDURAL; INFILTRATION; INTRACAUDAL; PERINEURAL at 11:40

## 2023-07-07 RX ADMIN — POTASSIUM CHLORIDE 10 MEQ: 7.46 INJECTION, SOLUTION INTRAVENOUS at 09:20

## 2023-07-07 RX ADMIN — HYDROMORPHONE HYDROCHLORIDE 0.5 MG: 1 INJECTION, SOLUTION INTRAMUSCULAR; INTRAVENOUS; SUBCUTANEOUS at 14:47

## 2023-07-07 RX ADMIN — SODIUM CHLORIDE, SODIUM LACTATE, POTASSIUM CHLORIDE, CALCIUM CHLORIDE AND DEXTROSE MONOHYDRATE: 5; 600; 310; 30; 20 INJECTION, SOLUTION INTRAVENOUS at 15:18

## 2023-07-07 RX ADMIN — FUROSEMIDE 40 MG: 10 INJECTION, SOLUTION INTRAVENOUS at 08:51

## 2023-07-07 RX ADMIN — IPRATROPIUM BROMIDE AND ALBUTEROL SULFATE 3 ML: .5; 3 SOLUTION RESPIRATORY (INHALATION) at 05:59

## 2023-07-07 RX ADMIN — ENOXAPARIN SODIUM 40 MG: 40 INJECTION SUBCUTANEOUS at 14:47

## 2023-07-07 RX ADMIN — CEFEPIME HYDROCHLORIDE 2 G: 2 INJECTION, POWDER, FOR SOLUTION INTRAVENOUS at 02:12

## 2023-07-07 ASSESSMENT — ACTIVITIES OF DAILY LIVING (ADL)
ADLS_ACUITY_SCORE: 43
ADLS_ACUITY_SCORE: 44
ADLS_ACUITY_SCORE: 43
ADLS_ACUITY_SCORE: 43
ADLS_ACUITY_SCORE: 40
ADLS_ACUITY_SCORE: 43
ADLS_ACUITY_SCORE: 44
ADLS_ACUITY_SCORE: 43
ADLS_ACUITY_SCORE: 44
ADLS_ACUITY_SCORE: 43

## 2023-07-07 NOTE — PROCEDURES
[facility]    Single Lumen Midline Placement    Date/Time: 7/7/2023 11:45 AM    Performed by: Justina Oquendo RN  Authorized by: Wilbur Gatica MD  Indications: vascular access      UNIVERSAL PROTOCOL   Site Marked: Yes  Prior Images Obtained and Reviewed:  Yes  Required items: Required blood products, implants, devices and special equipment available    Patient identity confirmed:  Arm band, hospital-assigned identification number and verbally with patient  NA - No sedation, light sedation, or local anesthesia  Confirmation Checklist:  Patient's identity using two indicators, relevant allergies, procedure was appropriate and matched the consent or emergent situation and correct equipment/implants were available  Time out: Immediately prior to the procedure a time out was called    Universal Protocol: the Joint Commission Universal Protocol was followed    Preparation: Patient was prepped and draped in usual sterile fashion    ESBL (mL):  3     ANESTHESIA    Anesthesia: See MAR for details  Local Anesthetic:  Lidocaine 1% without epinephrine  Anesthetic Total (mL):  2      SEDATION    Patient Sedated: No        Preparation: skin prepped with ChloraPrep  Skin prep agent: skin prep agent completely dried prior to procedure  Sterile barriers: maximum sterile barriers were used: cap, mask, sterile gown, sterile gloves, and large sterile sheet  Hand hygiene: hand hygiene performed prior to central venous catheter insertion  Type of line used: Midline  Catheter type: single lumen  Catheter size: 4 Fr  Brand: Bard  Lot number: [id number]  Placement method: venipuncture, ultrasound and MST  Number of attempts: 3  Difficulty threading catheter: yes (2 attempts unable to thread guide wire easily)  Successful placement: yes  Orientation: right    Location: basilic vein  Tip Location: distal to axillary vein  Arm circumference: adults 10 cm  Extremity circumference: 30  Visible catheter length:  0  Total catheter length: 20  Dressing and securement: gloves changed prior to final dressing, blood cleaned with CHG, blood removed, chlorhexidine disc applied, statlock, occlusive dressing applied and site cleansed  Post procedure assessment: blood return through all ports  PROCEDURE Describe Procedure: Patient instruct on Midline placement. Patient verbalized an understanding. Patient gave verbal consent for placement. VAT RN placed 4 FR midline catheter in basilic vein . Patient tolerated well. Blood return noted. RN caring for patient updated that Midline is ok to use.

## 2023-07-07 NOTE — PROGRESS NOTES
Patient still scoring high on the CIWA scale. Have given 2 doses of valium, one dose of haldol, and also needed to place patient legs in soft restraints for attempting to hit staff. Patient is not oriented and unable to redirect. Intermittently following directions. Dex is at max infusion as well. Dr. Tapan Bianchi was told about the restraints and he placed the order. Patient had a slight fever today of 102.2 axillary temp. And 101.6 temporal. A chest x-ray, MRSA nasal swab, and anti-biotics were ordered.

## 2023-07-07 NOTE — PLAN OF CARE
Pt tolerating 3L oxymask, VSS. Disoriented to time and situation, follows commands, purposeful in all extremities. Lasix given x2 for wheezing with good response. External catheter in place. No BM. NPO, pt not alert enough to take oral intake. Precedex gtt cut in half over shift. K and Phos replaced. Sitter present at bedside.  updated at bedside.

## 2023-07-07 NOTE — PROGRESS NOTES
[facility]    Medicine Progress Note - Hospitalist Service    Date of Admission:  7/4/2023    Assessment & Plan    Dariusz Ferreira is a 42 year old male admitted on 7/4/2023. He presents with alcohol withdrawal complicated by withdrawal seizure.    Alcohol withdrawal with alcohol withdrawal seizure  Alcohol use disorder  Longstanding hx alcohol use disorder. Has been to treatment 5+ times in the past. States binge drinks, has been drinking heavily last 2 weeks, stopped Saturday, withdrawal started Sunday and abdominal pain today. In ED tachy 100s, hypertensive, afebrile. WBC 16.5, hgb 19.1 (hemoconcentration). Etoh negative. Given phenobarbital 260 mg x 1 in ED. EKG ST.   Did suffer a generalized tonic clonic seizure the day of admission, and transferred to the ICU with initiation of precedex infusion.  * second dose of phenobarb 130mg ordered 7/6  plan  - CIWA with prn diazepam  - continue precedex  - vitamins including IV thamine  - will need chemical dependence eval, but reportedly declined immediately at admission  - start BID valproate to help titrate off of precedex    Acute hypoxemic respiratory failure, improving  Likely secondary to sedation, but we will evaluate for signs of developing pneumonia. Overall SIRS criteria are improving  Plan  - aspiration precautions  - PRN lasix for wheeze or signs of fluid overload    Fever  Presumed aspiration PNA  * CXR 7/7 shows increased infiltrates after febrile on 7/6 and 7/7.  - started cefepime for presume aspiration or hospital acquired PNA  - MRSA nasal swab negative so vanco stopped.     Toxic metabolic encephalopathy  Due to medication for withdrawal and likely underlying DTs  Plan  - wean sedatives as tolerates     Pancreatitis  Transaminitis/ elevate bilirubin  Pancreatic tail lesion  Alcoholic gastritis  With epigastric pain. Lipase 1083. AST/ /109. TB 1.5. acute LFT abnormalities 2/2 Etoh.   CT with acute interstitial  "pancreatitis  - SIRS criteria improving and no overt organ  Failure yet, probably would characterize as moderate pancreatitis. ICU needed primarily for alcohol withdrawal   - May start diet as soon as mentation improved.   - IV fluids to remain at 100  - pantoprazole 40 mg IV BID  - prn analgesics, antiemetics  - will need follow-up imaging of the tail lesion, will need to discuss with patient when able     Hypokalemia  Hypophosphatemia  - replace per protocol     Mood disorder  [needs rec- bupropion 300 mg daily, fluvoxamine 200 mg at HS, lamotrigine 300 mg daily]  - resume meds when able to take PO     Hx methamphetamine use disorder  Has had MSSA abscesses 2/2 IV meth use in the past. Denies meth use for several years.           Diet: NPO for Medical/Clinical Reasons Except for: Meds, Ice Chips    DVT Prophylaxis: Enoxaparin (Lovenox) SQ and Pneumatic Compression Devices  Martin Catheter: Not present  Lines: None     Cardiac Monitoring: ACTIVE order. Indication: ICU  Code Status: Full Code      Clinically Significant Risk Factors        # Hypokalemia: Lowest K = 3 mmol/L in last 2 days, will replace as needed       # Hypoalbuminemia: Lowest albumin = 2.7 g/dL at 7/6/2023  4:56 AM, will monitor as appropriate            # Overweight: Estimated body mass index is 29.8 kg/m  as calculated from the following:    Height as of this encounter: 1.702 m (5' 7\").    Weight as of this encounter: 86.3 kg (190 lb 4.1 oz)., PRESENT ON ADMISSION          Disposition Plan     Expected Discharge Date: 07/09/2023                   35 minutes of critical care, management of precedex, dosing phenobarb, management of severe etoh withdrawal.    Wilbur Gatica MD  Hospitalist Service  [facility]  Securely message with [employer] (more info)  Text page via [employer] Paging/Directory   ______________________________________________________________________    Interval History   Agitated overnight. Febrile yesterday " "afternoon into today. Started on abx. cxr shows infiltrate. More verbal with nursing today. Wheezing improving with diuresis. States \"good\" in response to all questions on my exam today.     Discussed with  marina by phone for 15min.     Physical Exam   Vital Signs: Temp: 98.9  F (37.2  C) Temp src: Axillary BP: (!) 144/91 Pulse: 77   Resp: 21 SpO2: 98 % O2 Device: Oxymask Oxygen Delivery: 3 LPM  Weight: 190 lbs 4.11 oz    General Appearance:  somnolent, awakens to touch. Grunting speech, moves all extremities  Respiratory: clear bilaterally  Cardiovascular: regular  no murmur. No edema  GI: distended, tender to light touch. No rebound or guarding  Skin: no rashes or lesions grossly    Other:  CN grossly intact, RAMIREZ            Medical Decision Making       35 MINUTES SPENT BY ME on the date of service doing chart review, history, exam, documentation & further activities per the note.    35 minutes of critical care, management of precedex, dosing IV benzos, dosing IV valproate, management of acutely ill ICU patient, management of severe etoh withdrawal.    Data     I have personally reviewed the following data over the past 24 hrs:    6.2  \   10.8 (L)   / 178     139 108 (H) 4.3 (L) /  128 (H)   3.0 (L) 22 0.70 \       Imaging results reviewed over the past 24 hrs:   Recent Results (from the past 24 hour(s))   XR Chest Port 1 View    Narrative    EXAM: XR CHEST PORT 1 VIEW  LOCATION: [facility]  DATE: 7/6/2023    INDICATION: febrile, eval for aspiration  COMPARISON: 07/05/2023      Impression    IMPRESSION: Stable mild bibasilar atelectasis with probable new tiny right pleural effusion.     "

## 2023-07-07 NOTE — PROVIDER NOTIFICATION
MD Notification    Notified Person: MD    Notified Person Name: Dr. Rodriguez    Notification Date/Time:7/7/23 0652    Notification Interaction: [employer] messaging    Purpose of Notification:Patient having increased expiratory wheezing; PRN nebs not effective. He is on Maxipime q8hrs also, on LRD5 at 100ml/hr. Do we want to Hold IV fluids? Repeat chest chest x-ray?    Orders Received:    Comments: Hold IV, repeat CXR

## 2023-07-07 NOTE — PLAN OF CARE
Patient  sedated on max dose Precedex. Valium given per CIWA protocol. He is disoriented, follows simple commands occasionally. Frequently gets restless, irritable and agitated. He attempts to get out of restraints/bed. Difficult to redirect. 4 point soft restraints in use. Sitter at bedside. Noted with expiratory wheezes; nebs not effective; MD notified awaiting response. External catheter in place with good urine output. No BM overnight

## 2023-07-08 LAB
ANION GAP SERPL CALCULATED.3IONS-SCNC: 11 MMOL/L (ref 7–15)
BUN SERPL-MCNC: 3.9 MG/DL (ref 6–20)
CALCIUM SERPL-MCNC: 8.3 MG/DL (ref 8.6–10)
CHLORIDE SERPL-SCNC: 105 MMOL/L (ref 98–107)
CREAT SERPL-MCNC: 0.69 MG/DL (ref 0.67–1.17)
DEPRECATED HCO3 PLAS-SCNC: 24 MMOL/L (ref 22–29)
ERYTHROCYTE [DISTWIDTH] IN BLOOD BY AUTOMATED COUNT: 13.2 % (ref 10–15)
GFR SERPL CREATININE-BSD FRML MDRD: >90 ML/MIN/1.73M2
GLUCOSE SERPL-MCNC: 113 MG/DL (ref 70–99)
HCT VFR BLD AUTO: 33.4 % (ref 40–53)
HGB BLD-MCNC: 11.2 G/DL (ref 13.3–17.7)
MAGNESIUM SERPL-MCNC: 1.9 MG/DL (ref 1.7–2.3)
MCH RBC QN AUTO: 29.3 PG (ref 26.5–33)
MCHC RBC AUTO-ENTMCNC: 33.5 G/DL (ref 31.5–36.5)
MCV RBC AUTO: 87 FL (ref 78–100)
PHOSPHATE SERPL-MCNC: 2.3 MG/DL (ref 2.5–4.5)
PLATELET # BLD AUTO: 214 10E3/UL (ref 150–450)
POTASSIUM SERPL-SCNC: 3.2 MMOL/L (ref 3.4–5.3)
POTASSIUM SERPL-SCNC: 3.3 MMOL/L (ref 3.4–5.3)
RBC # BLD AUTO: 3.82 10E6/UL (ref 4.4–5.9)
SODIUM SERPL-SCNC: 140 MMOL/L (ref 136–145)
WBC # BLD AUTO: 9.1 10E3/UL (ref 4–11)

## 2023-07-08 PROCEDURE — 250N000011 HC RX IP 250 OP 636: Performed by: STUDENT IN AN ORGANIZED HEALTH CARE EDUCATION/TRAINING PROGRAM

## 2023-07-08 PROCEDURE — 85027 COMPLETE CBC AUTOMATED: CPT | Performed by: STUDENT IN AN ORGANIZED HEALTH CARE EDUCATION/TRAINING PROGRAM

## 2023-07-08 PROCEDURE — 250N000009 HC RX 250: Performed by: STUDENT IN AN ORGANIZED HEALTH CARE EDUCATION/TRAINING PROGRAM

## 2023-07-08 PROCEDURE — 36415 COLL VENOUS BLD VENIPUNCTURE: CPT | Performed by: STUDENT IN AN ORGANIZED HEALTH CARE EDUCATION/TRAINING PROGRAM

## 2023-07-08 PROCEDURE — 99291 CRITICAL CARE FIRST HOUR: CPT | Performed by: STUDENT IN AN ORGANIZED HEALTH CARE EDUCATION/TRAINING PROGRAM

## 2023-07-08 PROCEDURE — 250N000011 HC RX IP 250 OP 636: Performed by: INTERNAL MEDICINE

## 2023-07-08 PROCEDURE — 250N000011 HC RX IP 250 OP 636: Performed by: HOSPITALIST

## 2023-07-08 PROCEDURE — 258N000003 HC RX IP 258 OP 636: Performed by: STUDENT IN AN ORGANIZED HEALTH CARE EDUCATION/TRAINING PROGRAM

## 2023-07-08 PROCEDURE — 200N000001 HC R&B ICU

## 2023-07-08 PROCEDURE — 84100 ASSAY OF PHOSPHORUS: CPT | Performed by: STUDENT IN AN ORGANIZED HEALTH CARE EDUCATION/TRAINING PROGRAM

## 2023-07-08 PROCEDURE — 80048 BASIC METABOLIC PNL TOTAL CA: CPT | Performed by: STUDENT IN AN ORGANIZED HEALTH CARE EDUCATION/TRAINING PROGRAM

## 2023-07-08 PROCEDURE — 83735 ASSAY OF MAGNESIUM: CPT | Performed by: STUDENT IN AN ORGANIZED HEALTH CARE EDUCATION/TRAINING PROGRAM

## 2023-07-08 PROCEDURE — 999N000128 HC STATISTIC PERIPHERAL IV START W/O US GUIDANCE

## 2023-07-08 PROCEDURE — 84132 ASSAY OF SERUM POTASSIUM: CPT | Performed by: STUDENT IN AN ORGANIZED HEALTH CARE EDUCATION/TRAINING PROGRAM

## 2023-07-08 PROCEDURE — C9113 INJ PANTOPRAZOLE SODIUM, VIA: HCPCS | Performed by: INTERNAL MEDICINE

## 2023-07-08 PROCEDURE — 250N000009 HC RX 250: Performed by: HOSPITALIST

## 2023-07-08 RX ORDER — ALBUTEROL SULFATE 0.83 MG/ML
2.5 SOLUTION RESPIRATORY (INHALATION)
Status: DISCONTINUED | OUTPATIENT
Start: 2023-07-08 | End: 2023-07-14 | Stop reason: HOSPADM

## 2023-07-08 RX ORDER — FOLIC ACID 5 MG/ML
1 INJECTION, SOLUTION INTRAMUSCULAR; INTRAVENOUS; SUBCUTANEOUS DAILY
Status: DISCONTINUED | OUTPATIENT
Start: 2023-07-08 | End: 2023-07-13

## 2023-07-08 RX ORDER — POTASSIUM CHLORIDE 7.45 MG/ML
10 INJECTION INTRAVENOUS
Status: COMPLETED | OUTPATIENT
Start: 2023-07-08 | End: 2023-07-08

## 2023-07-08 RX ORDER — FUROSEMIDE 10 MG/ML
40 INJECTION INTRAMUSCULAR; INTRAVENOUS ONCE
Status: COMPLETED | OUTPATIENT
Start: 2023-07-08 | End: 2023-07-08

## 2023-07-08 RX ADMIN — DIAZEPAM 10 MG: 5 INJECTION INTRAMUSCULAR; INTRAVENOUS at 09:23

## 2023-07-08 RX ADMIN — POTASSIUM CHLORIDE 10 MEQ: 10 INJECTION, SOLUTION INTRAVENOUS at 18:27

## 2023-07-08 RX ADMIN — POTASSIUM CHLORIDE 10 MEQ: 10 INJECTION, SOLUTION INTRAVENOUS at 17:21

## 2023-07-08 RX ADMIN — FUROSEMIDE 40 MG: 10 INJECTION, SOLUTION INTRAVENOUS at 09:55

## 2023-07-08 RX ADMIN — DEXMEDETOMIDINE HYDROCHLORIDE 1.2 MCG/KG/HR: 400 INJECTION INTRAVENOUS at 09:54

## 2023-07-08 RX ADMIN — HYDROMORPHONE HYDROCHLORIDE 0.5 MG: 1 INJECTION, SOLUTION INTRAMUSCULAR; INTRAVENOUS; SUBCUTANEOUS at 21:41

## 2023-07-08 RX ADMIN — DIAZEPAM 5 MG: 5 INJECTION INTRAMUSCULAR; INTRAVENOUS at 00:18

## 2023-07-08 RX ADMIN — DIAZEPAM 10 MG: 5 INJECTION INTRAMUSCULAR; INTRAVENOUS at 21:41

## 2023-07-08 RX ADMIN — POTASSIUM CHLORIDE 10 MEQ: 10 INJECTION, SOLUTION INTRAVENOUS at 20:40

## 2023-07-08 RX ADMIN — DIAZEPAM 10 MG: 5 INJECTION INTRAMUSCULAR; INTRAVENOUS at 12:16

## 2023-07-08 RX ADMIN — PHENOBARBITAL SODIUM 200 MG: 65 INJECTION INTRAMUSCULAR; INTRAVENOUS at 09:46

## 2023-07-08 RX ADMIN — PHENOBARBITAL SODIUM 200 MG: 65 INJECTION INTRAMUSCULAR; INTRAVENOUS at 15:14

## 2023-07-08 RX ADMIN — HYDROMORPHONE HYDROCHLORIDE 0.5 MG: 1 INJECTION, SOLUTION INTRAMUSCULAR; INTRAVENOUS; SUBCUTANEOUS at 00:18

## 2023-07-08 RX ADMIN — FOLIC ACID 1 MG: 5 INJECTION, SOLUTION INTRAMUSCULAR; INTRAVENOUS; SUBCUTANEOUS at 07:58

## 2023-07-08 RX ADMIN — ENOXAPARIN SODIUM 40 MG: 40 INJECTION SUBCUTANEOUS at 14:33

## 2023-07-08 RX ADMIN — DIAZEPAM 10 MG: 5 INJECTION INTRAMUSCULAR; INTRAVENOUS at 10:59

## 2023-07-08 RX ADMIN — THIAMINE HYDROCHLORIDE 250 MG: 100 INJECTION, SOLUTION INTRAMUSCULAR; INTRAVENOUS at 08:30

## 2023-07-08 RX ADMIN — SODIUM CHLORIDE, SODIUM LACTATE, POTASSIUM CHLORIDE, CALCIUM CHLORIDE AND DEXTROSE MONOHYDRATE: 5; 600; 310; 30; 20 INJECTION, SOLUTION INTRAVENOUS at 03:19

## 2023-07-08 RX ADMIN — POTASSIUM CHLORIDE 10 MEQ: 10 INJECTION, SOLUTION INTRAVENOUS at 19:29

## 2023-07-08 RX ADMIN — DEXMEDETOMIDINE HYDROCHLORIDE 1.2 MCG/KG/HR: 400 INJECTION INTRAVENOUS at 01:51

## 2023-07-08 RX ADMIN — HYDROMORPHONE HYDROCHLORIDE 0.5 MG: 1 INJECTION, SOLUTION INTRAMUSCULAR; INTRAVENOUS; SUBCUTANEOUS at 07:55

## 2023-07-08 RX ADMIN — THIAMINE HYDROCHLORIDE 500 MG: 100 INJECTION, SOLUTION INTRAMUSCULAR; INTRAVENOUS at 16:38

## 2023-07-08 RX ADMIN — DEXMEDETOMIDINE HYDROCHLORIDE 1.2 MCG/KG/HR: 400 INJECTION INTRAVENOUS at 05:56

## 2023-07-08 RX ADMIN — CEFEPIME HYDROCHLORIDE 2 G: 2 INJECTION, POWDER, FOR SOLUTION INTRAVENOUS at 04:57

## 2023-07-08 RX ADMIN — POTASSIUM CHLORIDE 10 MEQ: 10 INJECTION, SOLUTION INTRAVENOUS at 10:46

## 2023-07-08 RX ADMIN — FOLIC ACID 1 MG: 5 INJECTION, SOLUTION INTRAMUSCULAR; INTRAVENOUS; SUBCUTANEOUS at 09:26

## 2023-07-08 RX ADMIN — PHENOBARBITAL SODIUM 200 MG: 65 INJECTION INTRAMUSCULAR; INTRAVENOUS at 12:17

## 2023-07-08 RX ADMIN — DIAZEPAM 10 MG: 5 INJECTION INTRAMUSCULAR; INTRAVENOUS at 19:50

## 2023-07-08 RX ADMIN — SODIUM PHOSPHATE, MONOBASIC, MONOHYDRATE AND SODIUM PHOSPHATE, DIBASIC, ANHYDROUS 15 MMOL: 142; 276 INJECTION, SOLUTION INTRAVENOUS at 09:20

## 2023-07-08 RX ADMIN — HYDROMORPHONE HYDROCHLORIDE 0.5 MG: 1 INJECTION, SOLUTION INTRAMUSCULAR; INTRAVENOUS; SUBCUTANEOUS at 03:31

## 2023-07-08 RX ADMIN — SODIUM PHOSPHATE, MONOBASIC, MONOHYDRATE AND SODIUM PHOSPHATE, DIBASIC, ANHYDROUS 9 MMOL: 142; 276 INJECTION, SOLUTION INTRAVENOUS at 01:47

## 2023-07-08 RX ADMIN — CEFEPIME HYDROCHLORIDE 2 G: 2 INJECTION, POWDER, FOR SOLUTION INTRAVENOUS at 12:17

## 2023-07-08 RX ADMIN — POTASSIUM CHLORIDE 10 MEQ: 10 INJECTION, SOLUTION INTRAVENOUS at 09:20

## 2023-07-08 RX ADMIN — PANTOPRAZOLE SODIUM 40 MG: 40 INJECTION, POWDER, FOR SOLUTION INTRAVENOUS at 07:52

## 2023-07-08 RX ADMIN — DIAZEPAM 10 MG: 5 INJECTION INTRAMUSCULAR; INTRAVENOUS at 23:58

## 2023-07-08 RX ADMIN — CEFEPIME HYDROCHLORIDE 2 G: 2 INJECTION, POWDER, FOR SOLUTION INTRAVENOUS at 21:23

## 2023-07-08 RX ADMIN — PANTOPRAZOLE SODIUM 40 MG: 40 INJECTION, POWDER, FOR SOLUTION INTRAVENOUS at 19:57

## 2023-07-08 RX ADMIN — DIAZEPAM 10 MG: 5 INJECTION INTRAMUSCULAR; INTRAVENOUS at 10:32

## 2023-07-08 RX ADMIN — POTASSIUM CHLORIDE 10 MEQ: 10 INJECTION, SOLUTION INTRAVENOUS at 08:18

## 2023-07-08 RX ADMIN — THIAMINE HYDROCHLORIDE 500 MG: 100 INJECTION, SOLUTION INTRAMUSCULAR; INTRAVENOUS at 09:51

## 2023-07-08 RX ADMIN — POTASSIUM CHLORIDE 10 MEQ: 10 INJECTION, SOLUTION INTRAVENOUS at 12:18

## 2023-07-08 RX ADMIN — DEXMEDETOMIDINE HYDROCHLORIDE 1.2 MCG/KG/HR: 400 INJECTION INTRAVENOUS at 13:53

## 2023-07-08 RX ADMIN — IPRATROPIUM BROMIDE AND ALBUTEROL SULFATE 3 ML: .5; 3 SOLUTION RESPIRATORY (INHALATION) at 23:12

## 2023-07-08 RX ADMIN — VALPROATE SODIUM 500 MG: 100 INJECTION, SOLUTION INTRAVENOUS at 00:45

## 2023-07-08 RX ADMIN — DEXMEDETOMIDINE HYDROCHLORIDE 0.8 MCG/KG/HR: 400 INJECTION INTRAVENOUS at 19:29

## 2023-07-08 RX ADMIN — HALOPERIDOL LACTATE 2.5 MG: 5 INJECTION, SOLUTION INTRAMUSCULAR at 23:58

## 2023-07-08 RX ADMIN — THIAMINE HYDROCHLORIDE 500 MG: 100 INJECTION, SOLUTION INTRAMUSCULAR; INTRAVENOUS at 21:55

## 2023-07-08 RX ADMIN — PHENOBARBITAL SODIUM 64.8 MG: 65 INJECTION INTRAMUSCULAR; INTRAVENOUS at 22:45

## 2023-07-08 RX ADMIN — HYDROMORPHONE HYDROCHLORIDE 0.5 MG: 1 INJECTION, SOLUTION INTRAMUSCULAR; INTRAVENOUS; SUBCUTANEOUS at 13:54

## 2023-07-08 ASSESSMENT — ACTIVITIES OF DAILY LIVING (ADL)
ADLS_ACUITY_SCORE: 43
ADLS_ACUITY_SCORE: 47
ADLS_ACUITY_SCORE: 43
ADLS_ACUITY_SCORE: 43

## 2023-07-08 NOTE — PROGRESS NOTES
[facility]    Medicine Progress Note - Hospitalist Service    Date of Admission:  7/4/2023    Assessment & Plan    Dariusz Ferreira is a 42 year old male admitted on 7/4/2023. He presents with alcohol withdrawal complicated by withdrawal seizure.    Alcohol withdrawal with alcohol withdrawal seizure  Alcohol use disorder  Longstanding hx alcohol use disorder. Has been to treatment 5+ times in the past. States binge drinks, has been drinking heavily last 2 weeks, stopped Saturday, withdrawal started Sunday and abdominal pain today. In ED tachy 100s, hypertensive, afebrile. WBC 16.5, hgb 19.1 (hemoconcentration). Etoh negative. Given phenobarbital 260 mg x 1 in ED. EKG ST.   Did suffer a generalized tonic clonic seizure the day of admission, and transferred to the ICU with initiation of precedex infusion.  * second dose of phenobarb 130mg ordered 7/6  plan  - CIWA with prn diazepam  - continue precedex  - vitamins including IV thiamine (wernickes panel)  - will need chemical dependence eval, but reportedly declined immediately at admission  - stop valproate  - phenobarb taper started 7/8 due to ongoing severe withdrawal    Acute hypoxemic respiratory failure, improving  Likely secondary to sedation, but we will evaluate for signs of developing pneumonia. Overall SIRS criteria are improving  Plan  - aspiration precautions  - PRN lasix for wheeze or signs of fluid overload. Dose again on 7/8.    Fever  Presumed aspiration PNA  * CXR 7/7 shows increased infiltrates after febrile on 7/6 and 7/7.  - started cefepime for presume aspiration or hospital acquired PNA  - MRSA nasal swab negative so vanco stopped.     Toxic metabolic encephalopathy  Due to medication for withdrawal and likely underlying DTs  Plan  - wean sedatives as tolerates     Pancreatitis  Transaminitis/ elevate bilirubin  Pancreatic tail lesion  Alcoholic gastritis  With epigastric pain. Lipase 1083. AST/ /109. TB 1.5.  "acute LFT abnormalities 2/2 Etoh.   CT with acute interstitial pancreatitis  - SIRS criteria improving and no overt organ  Failure yet, probably would characterize as moderate pancreatitis. ICU needed primarily for alcohol withdrawal   - May start diet as soon as mentation improved.   - IV fluids to remain at 100  - pantoprazole 40 mg IV BID  - prn analgesics, antiemetics  - will need follow-up imaging of the tail lesion, will need to discuss with patient when able     Hypokalemia  Hypophosphatemia  - replace per protocol     Mood disorder  [needs rec- bupropion 300 mg daily, fluvoxamine 200 mg at HS, lamotrigine 300 mg daily]  - resume meds when able to take PO     Hx methamphetamine use disorder  Has had MSSA abscesses 2/2 IV meth use in the past. Denies meth use for several years.           Diet: Clear Liquid Diet    DVT Prophylaxis: Enoxaparin (Lovenox) SQ and Pneumatic Compression Devices  Martin Catheter: Not present  Lines: PRESENT             Cardiac Monitoring: ACTIVE order. Indication: ICU  Code Status: Full Code      Clinically Significant Risk Factors        # Hypokalemia: Lowest K = 3 mmol/L in last 2 days, will replace as needed       # Hypoalbuminemia: Lowest albumin = 2.7 g/dL at 7/6/2023  4:56 AM, will monitor as appropriate            # Overweight: Estimated body mass index is 28.66 kg/m  as calculated from the following:    Height as of this encounter: 1.702 m (5' 7\").    Weight as of this encounter: 83 kg (182 lb 15.7 oz).           Disposition Plan     Expected Discharge Date: 07/12/2023                   35 minutes of critical care, management of precedex, dosing phenobarb, management of severe etoh withdrawal.    Wilbur Gatica MD  Hospitalist Service  [facility]  Securely message with [employer] (more info)  Text page via [facility] Paging/Directory   ______________________________________________________________________    Interval History   Agitated overnight, again. " Afebrile last 24hours. Still maxed on precedex, was able to come down a bit yesterday, but back up overnight. Wbc going up. Can't answer questions for me. Discussed with ICU team. Will attempt phenobarb taper. May need intubation.     Discussed with RN, sit, ICU MD, ICU pharmD.     Physical Exam   Vital Signs: Temp: 98.7  F (37.1  C) Temp src: Axillary BP: (!) 141/95 Pulse: 84   Resp: 20 SpO2: 97 % O2 Device: Oxymask Oxygen Delivery: 3 LPM  Weight: 182 lbs 15.71 oz    General Appearance:  somnolent, awakens to touch. Grunting speech, moves all extremities  Respiratory: crackles in base. Wheeze.  Cardiovascular: regular  no murmur. No edema  GI: distended, tender to light touch. No rebound or guarding  Skin: no rashes or lesions grossly    Other:  CN grossly intact, RAMIREZ       Medical Decision Making       35 MINUTES SPENT BY ME on the date of service doing chart review, history, exam, documentation & further activities per the note.    35 minutes of critical care, management of precedex, dosing IV benzos, dosing IV valproate, phenobarb taper, management of acutely ill ICU patient, management of severe etoh withdrawal.    Data     I have personally reviewed the following data over the past 24 hrs:    9.1  \   11.2 (L)   / 214     140 105 3.9 (L) /  113 (H)   3.3 (L) 24 0.69 \       Imaging results reviewed over the past 24 hrs:   No results found for this or any previous visit (from the past 24 hour(s)).

## 2023-07-08 NOTE — PLAN OF CARE
Neuro: Patient is oriented to self at times. Precedex gtt continuous for agitation,  Valium given x 3. Sitter at bedside.  Resp: LS wheeze, RR 16-24, on 4 L O 2 via Oxy mask.   CV: SR, BP within parameters, pain relief with IV dilaudid.  /GI:  Poor oral intake. Primofit for urine, no BM overnight,   Lines/Gtt: PIV x 2, R Mid Line in place, NS @ 100 ml /h  Plan: Continue to manage withdrawal symptoms.

## 2023-07-08 NOTE — PLAN OF CARE
Pt restless and agitated at times, drowsy in am.  Pt kicking legs, attempting to get out of bed, responding to visual hallucinations most of afternoon.  Pt pulling at lines.  Pt answers questions, confused  Pt received 4 10 mg doses of valium to help decrease agitation and precedex maxed to 1.2 mcg.  Pt especially agitated when unable to void and large amts voided after lasix given.  Pt currently on 0.6 mcg of precedex.  Pt resting comfortably.  Audible wheezes still present, but improved.  Crackles in bases of lungs.  Pt in sinus rhythm, 97-99% on 4 liters oxymask.  Pt sister and cousin visited today and updated on plan of care.  Pt potassium and phosphorus replaced.  Pt received more iv potassium currently for k of 3.2

## 2023-07-09 LAB
ANION GAP SERPL CALCULATED.3IONS-SCNC: 13 MMOL/L (ref 7–15)
BUN SERPL-MCNC: 3.7 MG/DL (ref 6–20)
CALCIUM SERPL-MCNC: 8.5 MG/DL (ref 8.6–10)
CHLORIDE SERPL-SCNC: 103 MMOL/L (ref 98–107)
CREAT SERPL-MCNC: 0.68 MG/DL (ref 0.67–1.17)
DEPRECATED HCO3 PLAS-SCNC: 23 MMOL/L (ref 22–29)
ERYTHROCYTE [DISTWIDTH] IN BLOOD BY AUTOMATED COUNT: 13.1 % (ref 10–15)
GFR SERPL CREATININE-BSD FRML MDRD: >90 ML/MIN/1.73M2
GLUCOSE SERPL-MCNC: 114 MG/DL (ref 70–99)
HCT VFR BLD AUTO: 31.5 % (ref 40–53)
HGB BLD-MCNC: 10.7 G/DL (ref 13.3–17.7)
MAGNESIUM SERPL-MCNC: 1.8 MG/DL (ref 1.7–2.3)
MCH RBC QN AUTO: 29.4 PG (ref 26.5–33)
MCHC RBC AUTO-ENTMCNC: 34 G/DL (ref 31.5–36.5)
MCV RBC AUTO: 87 FL (ref 78–100)
PHOSPHATE SERPL-MCNC: 1.6 MG/DL (ref 2.5–4.5)
PHOSPHATE SERPL-MCNC: 1.9 MG/DL (ref 2.5–4.5)
PLATELET # BLD AUTO: 263 10E3/UL (ref 150–450)
POTASSIUM SERPL-SCNC: 3.2 MMOL/L (ref 3.4–5.3)
POTASSIUM SERPL-SCNC: 3.3 MMOL/L (ref 3.4–5.3)
POTASSIUM SERPL-SCNC: 3.8 MMOL/L (ref 3.4–5.3)
RBC # BLD AUTO: 3.64 10E6/UL (ref 4.4–5.9)
SODIUM SERPL-SCNC: 139 MMOL/L (ref 136–145)
WBC # BLD AUTO: 11.6 10E3/UL (ref 4–11)

## 2023-07-09 PROCEDURE — 250N000011 HC RX IP 250 OP 636: Mod: JZ

## 2023-07-09 PROCEDURE — 80048 BASIC METABOLIC PNL TOTAL CA: CPT | Performed by: STUDENT IN AN ORGANIZED HEALTH CARE EDUCATION/TRAINING PROGRAM

## 2023-07-09 PROCEDURE — 84132 ASSAY OF SERUM POTASSIUM: CPT | Performed by: STUDENT IN AN ORGANIZED HEALTH CARE EDUCATION/TRAINING PROGRAM

## 2023-07-09 PROCEDURE — 83735 ASSAY OF MAGNESIUM: CPT | Performed by: STUDENT IN AN ORGANIZED HEALTH CARE EDUCATION/TRAINING PROGRAM

## 2023-07-09 PROCEDURE — 200N000001 HC R&B ICU

## 2023-07-09 PROCEDURE — 250N000013 HC RX MED GY IP 250 OP 250 PS 637: Performed by: STUDENT IN AN ORGANIZED HEALTH CARE EDUCATION/TRAINING PROGRAM

## 2023-07-09 PROCEDURE — 250N000011 HC RX IP 250 OP 636: Performed by: HOSPITALIST

## 2023-07-09 PROCEDURE — 250N000009 HC RX 250: Performed by: HOSPITALIST

## 2023-07-09 PROCEDURE — 250N000009 HC RX 250: Performed by: STUDENT IN AN ORGANIZED HEALTH CARE EDUCATION/TRAINING PROGRAM

## 2023-07-09 PROCEDURE — 36415 COLL VENOUS BLD VENIPUNCTURE: CPT | Performed by: STUDENT IN AN ORGANIZED HEALTH CARE EDUCATION/TRAINING PROGRAM

## 2023-07-09 PROCEDURE — 258N000003 HC RX IP 258 OP 636: Performed by: INTERNAL MEDICINE

## 2023-07-09 PROCEDURE — 250N000011 HC RX IP 250 OP 636: Performed by: STUDENT IN AN ORGANIZED HEALTH CARE EDUCATION/TRAINING PROGRAM

## 2023-07-09 PROCEDURE — 36569 INSJ PICC 5 YR+ W/O IMAGING: CPT

## 2023-07-09 PROCEDURE — 85027 COMPLETE CBC AUTOMATED: CPT | Performed by: STUDENT IN AN ORGANIZED HEALTH CARE EDUCATION/TRAINING PROGRAM

## 2023-07-09 PROCEDURE — 272N000452 HC KIT SHRLOCK 5FR POWER PICC TRIPLE LUMEN

## 2023-07-09 PROCEDURE — 258N000003 HC RX IP 258 OP 636: Performed by: STUDENT IN AN ORGANIZED HEALTH CARE EDUCATION/TRAINING PROGRAM

## 2023-07-09 PROCEDURE — 84100 ASSAY OF PHOSPHORUS: CPT | Performed by: STUDENT IN AN ORGANIZED HEALTH CARE EDUCATION/TRAINING PROGRAM

## 2023-07-09 PROCEDURE — C9113 INJ PANTOPRAZOLE SODIUM, VIA: HCPCS | Performed by: INTERNAL MEDICINE

## 2023-07-09 PROCEDURE — 99291 CRITICAL CARE FIRST HOUR: CPT | Performed by: STUDENT IN AN ORGANIZED HEALTH CARE EDUCATION/TRAINING PROGRAM

## 2023-07-09 PROCEDURE — 250N000009 HC RX 250: Performed by: INTERNAL MEDICINE

## 2023-07-09 PROCEDURE — 250N000011 HC RX IP 250 OP 636: Performed by: INTERNAL MEDICINE

## 2023-07-09 RX ORDER — FUROSEMIDE 10 MG/ML
40 INJECTION INTRAMUSCULAR; INTRAVENOUS ONCE
Status: COMPLETED | OUTPATIENT
Start: 2023-07-09 | End: 2023-07-09

## 2023-07-09 RX ORDER — POTASSIUM CHLORIDE 29.8 MG/ML
20 INJECTION INTRAVENOUS ONCE
Status: COMPLETED | OUTPATIENT
Start: 2023-07-09 | End: 2023-07-09

## 2023-07-09 RX ORDER — POTASSIUM CHLORIDE 7.45 MG/ML
10 INJECTION INTRAVENOUS
Status: COMPLETED | OUTPATIENT
Start: 2023-07-09 | End: 2023-07-09

## 2023-07-09 RX ORDER — QUETIAPINE FUMARATE 25 MG/1
25 TABLET, FILM COATED ORAL 2 TIMES DAILY PRN
Status: DISCONTINUED | OUTPATIENT
Start: 2023-07-09 | End: 2023-07-14 | Stop reason: HOSPADM

## 2023-07-09 RX ORDER — DEXTROSE MONOHYDRATE, SODIUM CHLORIDE, AND POTASSIUM CHLORIDE 50; 2.98; 4.5 G/1000ML; G/1000ML; G/1000ML
INJECTION, SOLUTION INTRAVENOUS CONTINUOUS
Status: DISCONTINUED | OUTPATIENT
Start: 2023-07-09 | End: 2023-07-09

## 2023-07-09 RX ORDER — POTASSIUM CHLORIDE 7.45 MG/ML
10 INJECTION INTRAVENOUS
Status: DISCONTINUED | OUTPATIENT
Start: 2023-07-09 | End: 2023-07-09

## 2023-07-09 RX ADMIN — FUROSEMIDE 40 MG: 10 INJECTION, SOLUTION INTRAVENOUS at 10:44

## 2023-07-09 RX ADMIN — DEXMEDETOMIDINE HYDROCHLORIDE 1.2 MCG/KG/HR: 400 INJECTION INTRAVENOUS at 00:18

## 2023-07-09 RX ADMIN — SODIUM PHOSPHATE, MONOBASIC, MONOHYDRATE AND SODIUM PHOSPHATE, DIBASIC, ANHYDROUS 9 MMOL: 142; 276 INJECTION, SOLUTION INTRAVENOUS at 21:28

## 2023-07-09 RX ADMIN — PANTOPRAZOLE SODIUM 40 MG: 40 INJECTION, POWDER, FOR SOLUTION INTRAVENOUS at 08:02

## 2023-07-09 RX ADMIN — HYDROMORPHONE HYDROCHLORIDE 0.5 MG: 1 INJECTION, SOLUTION INTRAMUSCULAR; INTRAVENOUS; SUBCUTANEOUS at 18:32

## 2023-07-09 RX ADMIN — DIAZEPAM 10 MG: 5 INJECTION INTRAMUSCULAR; INTRAVENOUS at 04:01

## 2023-07-09 RX ADMIN — DEXMEDETOMIDINE HYDROCHLORIDE 1.2 MCG/KG/HR: 400 INJECTION INTRAVENOUS at 17:48

## 2023-07-09 RX ADMIN — THIAMINE HYDROCHLORIDE 500 MG: 100 INJECTION, SOLUTION INTRAMUSCULAR; INTRAVENOUS at 15:22

## 2023-07-09 RX ADMIN — DIAZEPAM 10 MG: 5 INJECTION INTRAMUSCULAR; INTRAVENOUS at 07:47

## 2023-07-09 RX ADMIN — THIAMINE HYDROCHLORIDE 500 MG: 100 INJECTION, SOLUTION INTRAMUSCULAR; INTRAVENOUS at 21:45

## 2023-07-09 RX ADMIN — SODIUM CHLORIDE, SODIUM LACTATE, POTASSIUM CHLORIDE, CALCIUM CHLORIDE AND DEXTROSE MONOHYDRATE: 5; 600; 310; 30; 20 INJECTION, SOLUTION INTRAVENOUS at 00:18

## 2023-07-09 RX ADMIN — POTASSIUM CHLORIDE 10 MEQ: 10 INJECTION, SOLUTION INTRAVENOUS at 17:11

## 2023-07-09 RX ADMIN — ENOXAPARIN SODIUM 40 MG: 40 INJECTION SUBCUTANEOUS at 14:17

## 2023-07-09 RX ADMIN — SODIUM PHOSPHATE, MONOBASIC, MONOHYDRATE AND SODIUM PHOSPHATE, DIBASIC, ANHYDROUS 9 MMOL: 142; 276 INJECTION, SOLUTION INTRAVENOUS at 10:44

## 2023-07-09 RX ADMIN — DIAZEPAM 5 MG: 5 INJECTION INTRAMUSCULAR; INTRAVENOUS at 22:00

## 2023-07-09 RX ADMIN — CEFEPIME HYDROCHLORIDE 2 G: 2 INJECTION, POWDER, FOR SOLUTION INTRAVENOUS at 13:40

## 2023-07-09 RX ADMIN — HYDROMORPHONE HYDROCHLORIDE 0.5 MG: 1 INJECTION, SOLUTION INTRAMUSCULAR; INTRAVENOUS; SUBCUTANEOUS at 08:37

## 2023-07-09 RX ADMIN — DEXMEDETOMIDINE HYDROCHLORIDE 1.2 MCG/KG/HR: 400 INJECTION INTRAVENOUS at 12:49

## 2023-07-09 RX ADMIN — SODIUM PHOSPHATE, MONOBASIC, MONOHYDRATE AND SODIUM PHOSPHATE, DIBASIC, ANHYDROUS 9 MMOL: 142; 276 INJECTION, SOLUTION INTRAVENOUS at 19:17

## 2023-07-09 RX ADMIN — DEXMEDETOMIDINE HYDROCHLORIDE 1.2 MCG/KG/HR: 400 INJECTION INTRAVENOUS at 08:07

## 2023-07-09 RX ADMIN — PHENOBARBITAL SODIUM 64.8 MG: 65 INJECTION INTRAMUSCULAR; INTRAVENOUS at 10:46

## 2023-07-09 RX ADMIN — DIAZEPAM 5 MG: 5 INJECTION INTRAMUSCULAR; INTRAVENOUS at 21:27

## 2023-07-09 RX ADMIN — HYDROMORPHONE HYDROCHLORIDE 0.5 MG: 1 INJECTION, SOLUTION INTRAMUSCULAR; INTRAVENOUS; SUBCUTANEOUS at 21:50

## 2023-07-09 RX ADMIN — PANTOPRAZOLE SODIUM 40 MG: 40 INJECTION, POWDER, FOR SOLUTION INTRAVENOUS at 20:23

## 2023-07-09 RX ADMIN — DIAZEPAM 10 MG: 5 INJECTION INTRAMUSCULAR; INTRAVENOUS at 12:36

## 2023-07-09 RX ADMIN — SODIUM PHOSPHATE, MONOBASIC, MONOHYDRATE AND SODIUM PHOSPHATE, DIBASIC, ANHYDROUS 9 MMOL: 142; 276 INJECTION, SOLUTION INTRAVENOUS at 08:37

## 2023-07-09 RX ADMIN — DIAZEPAM 10 MG: 5 INJECTION INTRAMUSCULAR; INTRAVENOUS at 05:50

## 2023-07-09 RX ADMIN — POTASSIUM CHLORIDE 10 MEQ: 10 INJECTION, SOLUTION INTRAVENOUS at 08:38

## 2023-07-09 RX ADMIN — POTASSIUM CHLORIDE 10 MEQ: 10 INJECTION, SOLUTION INTRAVENOUS at 10:46

## 2023-07-09 RX ADMIN — THIAMINE HYDROCHLORIDE 500 MG: 100 INJECTION, SOLUTION INTRAMUSCULAR; INTRAVENOUS at 10:59

## 2023-07-09 RX ADMIN — DIAZEPAM 10 MG: 5 INJECTION INTRAMUSCULAR; INTRAVENOUS at 18:34

## 2023-07-09 RX ADMIN — PHENOBARBITAL SODIUM 64.8 MG: 65 INJECTION INTRAMUSCULAR; INTRAVENOUS at 22:57

## 2023-07-09 RX ADMIN — HYDROMORPHONE HYDROCHLORIDE 0.5 MG: 1 INJECTION, SOLUTION INTRAMUSCULAR; INTRAVENOUS; SUBCUTANEOUS at 04:02

## 2023-07-09 RX ADMIN — POTASSIUM CHLORIDE 10 MEQ: 10 INJECTION, SOLUTION INTRAVENOUS at 18:35

## 2023-07-09 RX ADMIN — POTASSIUM CHLORIDE 10 MEQ: 10 INJECTION, SOLUTION INTRAVENOUS at 07:43

## 2023-07-09 RX ADMIN — POTASSIUM CHLORIDE 10 MEQ: 10 INJECTION, SOLUTION INTRAVENOUS at 09:40

## 2023-07-09 RX ADMIN — HYDROMORPHONE HYDROCHLORIDE 0.5 MG: 1 INJECTION, SOLUTION INTRAMUSCULAR; INTRAVENOUS; SUBCUTANEOUS at 14:03

## 2023-07-09 RX ADMIN — CEFEPIME HYDROCHLORIDE 2 G: 2 INJECTION, POWDER, FOR SOLUTION INTRAVENOUS at 20:23

## 2023-07-09 RX ADMIN — DIAZEPAM 10 MG: 5 INJECTION INTRAMUSCULAR; INTRAVENOUS at 17:10

## 2023-07-09 RX ADMIN — QUETIAPINE FUMARATE 25 MG: 25 TABLET ORAL at 12:49

## 2023-07-09 RX ADMIN — POTASSIUM CHLORIDE: 2 INJECTION, SOLUTION, CONCENTRATE INTRAVENOUS at 20:20

## 2023-07-09 RX ADMIN — DEXMEDETOMIDINE HYDROCHLORIDE 1.2 MCG/KG/HR: 400 INJECTION INTRAVENOUS at 04:33

## 2023-07-09 RX ADMIN — CEFEPIME HYDROCHLORIDE 2 G: 2 INJECTION, POWDER, FOR SOLUTION INTRAVENOUS at 04:33

## 2023-07-09 RX ADMIN — POTASSIUM CHLORIDE 20 MEQ: 29.8 INJECTION, SOLUTION INTRAVENOUS at 20:23

## 2023-07-09 RX ADMIN — DEXMEDETOMIDINE HYDROCHLORIDE 1.1 MCG/KG/HR: 400 INJECTION INTRAVENOUS at 22:22

## 2023-07-09 RX ADMIN — FOLIC ACID 1 MG: 5 INJECTION, SOLUTION INTRAMUSCULAR; INTRAVENOUS; SUBCUTANEOUS at 09:43

## 2023-07-09 ASSESSMENT — ACTIVITIES OF DAILY LIVING (ADL)
ADLS_ACUITY_SCORE: 45
ADLS_ACUITY_SCORE: 41
ADLS_ACUITY_SCORE: 47
ADLS_ACUITY_SCORE: 41
ADLS_ACUITY_SCORE: 45
ADLS_ACUITY_SCORE: 45
ADLS_ACUITY_SCORE: 41
ADLS_ACUITY_SCORE: 41
ADLS_ACUITY_SCORE: 47
ADLS_ACUITY_SCORE: 41
ADLS_ACUITY_SCORE: 45
ADLS_ACUITY_SCORE: 41

## 2023-07-09 NOTE — PROVIDER NOTIFICATION
MD Notification    Notified Person: MD    Notified Person Name: Ximena Edwards     Notification Date/Time: 7/8/23 2153     Notification Interaction: AMCOM     Purpose of Notification: ICU : FYI, pt has been restless and agitated, dex maxed at 1.2, valium 10mg and dilaudid given. audible  expiratory wheezing. can we try to give a neb PRN?     Orders Received: albuterol q2 PRN for SOB    Comments:

## 2023-07-09 NOTE — PLAN OF CARE
Neuro:  Restless/agitated, Precedex at max rate,  CIWA-ar, pt c occasional visual hallucinations, PRN Valium/Haldol per MAR, PERRL.  Moves all extremities.  Int Follows commands; disoriented to date/time and situation  Cardiovascular:  RRR. SR at rest, ST to 110s when agitated/restless   Pulmonary:  audbile wheezes-Duoneb x1, Tachypneic w/ restlessness, O2 per nc at 3lpm, SPO2 WNL  GI: clear liquids, pt w/ increased demands for fluid intake, limited dt mentation and altered breathing pattern  :  External cath, needs encouragement/reminder to pee, brisk urine output   Skin:  Intact except scabs at elbows and inner forearms  Restraints:  In use and necessary.      AM labs noted; electrolytes replaced as indicated.  Continue to monitor closely.

## 2023-07-09 NOTE — PROGRESS NOTES
[facility]    Medicine Progress Note - Hospitalist Service    Date of Admission:  7/4/2023    Assessment & Plan    Dariusz Ferreira is a 42 year old male admitted on 7/4/2023. He presents with alcohol withdrawal complicated by withdrawal seizure.    Alcohol withdrawal with alcohol withdrawal seizure  Alcohol use disorder  Longstanding hx alcohol use disorder. Has been to treatment 5+ times in the past. States binge drinks, has been drinking heavily last 2 weeks, stopped Saturday, withdrawal started Sunday and abdominal pain today. In ED tachy 100s, hypertensive, afebrile. WBC 16.5, hgb 19.1 (hemoconcentration). Etoh negative. Given phenobarbital 260 mg x 1 in ED. EKG ST.   Did suffer a generalized tonic clonic seizure the day of admission, and transferred to the ICU with initiation of precedex infusion.  * second dose of phenobarb 130mg ordered 7/6  plan  - CIWA with prn diazepam  - continue precedex  - vitamins including IV thiamine (wernickes panel)  - will need chemical dependence eval, but reportedly declined immediately at admission  - phenobarb taper started 7/8 due to ongoing severe withdrawal    Acute hypoxemic respiratory failure, improving  Likely secondary to sedation, but we will evaluate for signs of developing pneumonia. Overall SIRS criteria are improving  Plan  - aspiration precautions  - PRN lasix for wheeze or signs of fluid overload. Dose again on 7/8.    Fever  Presumed aspiration PNA  * CXR 7/7 shows increased infiltrates after febrile on 7/6 and 7/7.  - started cefepime for presume aspiration or hospital acquired PNA  - MRSA nasal swab negative so vanco stopped.     Toxic metabolic encephalopathy  Due to medication for withdrawal and likely underlying DTs  Plan  - wean sedatives as tolerates     Pancreatitis  Transaminitis/ elevate bilirubin  Pancreatic tail lesion  Alcoholic gastritis  With epigastric pain. Lipase 1083. AST/ /109. TB 1.5. acute LFT  "abnormalities 2/2 Etoh.   CT with acute interstitial pancreatitis  - SIRS criteria improving and no overt organ  Failure yet, probably would characterize as moderate pancreatitis. ICU needed primarily for alcohol withdrawal   - May start diet as soon as mentation improved.   - IV fluids to remain at 100  - pantoprazole 40 mg IV BID  - prn analgesics, antiemetics  - will need follow-up imaging of the tail lesion, will need to discuss with patient when able     Hypokalemia  Hypophosphatemia  - replace per protocol     Mood disorder  [needs rec- bupropion 300 mg daily, fluvoxamine 200 mg at HS, lamotrigine 300 mg daily]  - resume meds when able to take PO     Hx methamphetamine use disorder  Has had MSSA abscesses 2/2 IV meth use in the past. Denies meth use for several years.           Diet: Clear Liquid Diet    DVT Prophylaxis: Enoxaparin (Lovenox) SQ and Pneumatic Compression Devices  Martin Catheter: Not present  Lines: PRESENT             Cardiac Monitoring: ACTIVE order. Indication: ICU  Code Status: Full Code      Clinically Significant Risk Factors        # Hypokalemia: Lowest K = 3.2 mmol/L in last 2 days, will replace as needed       # Hypoalbuminemia: Lowest albumin = 2.7 g/dL at 7/6/2023  4:56 AM, will monitor as appropriate            # Overweight: Estimated body mass index is 28.69 kg/m  as calculated from the following:    Height as of this encounter: 1.702 m (5' 7\").    Weight as of this encounter: 83.1 kg (183 lb 3.2 oz).           Disposition Plan     Expected Discharge Date: 07/12/2023                   30 minutes of critical care, management of precedex, dosing phenobarb, management of severe etoh withdrawal.    Wilbur Gatica MD  Hospitalist Service  [facility]  Securely message with [employer] (more info)  Text page via [facility] Paging/Directory   ______________________________________________________________________    Interval History   Agitated overnight, again. Afebrile. " Still maxed on precedex. Able to answer a few questions for me. This AM he knew he was in a hospital.  Zeyad at bedside and discussed with him.      Discussed with RN, sit, ICU MD, ICU pharmD.     Physical Exam   Vital Signs: Temp: 98.1  F (36.7  C) Temp src: Oral BP: (!) 158/102 Pulse: 80   Resp: 24 SpO2: 98 % O2 Device: Nasal cannula Oxygen Delivery: 3 LPM  Weight: 183 lbs 3.24 oz    General Appearance:  somnolent, awakens to touch. moves all extremities  Respiratory: crackles in base. Wheeze.  Cardiovascular: regular  no murmur. No edema  GI: distended, tender to light touch. No rebound or guarding  Skin: no rashes or lesions grossly    Other:  CN grossly intact, RAMIREZ       Medical Decision Making       30 MINUTES SPENT BY ME on the date of service doing chart review, history, exam, documentation & further activities per the note.    30 minutes of critical care, management of precedex, dosing IV benzos, dosing IV valproate, phenobarb taper, management of acutely ill ICU patient, management of severe etoh withdrawal.    Data     I have personally reviewed the following data over the past 24 hrs:    11.6 (H)  \   10.7 (L)   / 263     139 103 3.7 (L) /  114 (H)   3.3 (L) 23 0.68 \       Imaging results reviewed over the past 24 hrs:   No results found for this or any previous visit (from the past 24 hour(s)).

## 2023-07-09 NOTE — PLAN OF CARE
Goal Outcome Evaluation:  Pt remains on max dose of precedex. Pt continues to be agitated/restless throwing legs over the siderails. Pt not redirectable. Sitter at bedside for pt safety. Valium given for CIWA prn. Seroquel started today. Lasix given with large output with external catheter. Electrolytes replaced per protocol. Midline being rewired to PICC as pt on multiple IV medications. Zeyad/spouse updated at bedside.

## 2023-07-10 ENCOUNTER — APPOINTMENT (OUTPATIENT)
Dept: CT IMAGING | Facility: CLINIC | Age: 43
DRG: 896 | End: 2023-07-10
Attending: STUDENT IN AN ORGANIZED HEALTH CARE EDUCATION/TRAINING PROGRAM
Payer: COMMERCIAL

## 2023-07-10 ENCOUNTER — APPOINTMENT (OUTPATIENT)
Dept: ULTRASOUND IMAGING | Facility: CLINIC | Age: 43
DRG: 896 | End: 2023-07-10
Attending: STUDENT IN AN ORGANIZED HEALTH CARE EDUCATION/TRAINING PROGRAM
Payer: COMMERCIAL

## 2023-07-10 LAB
ALBUMIN UR-MCNC: 10 MG/DL
ANION GAP SERPL CALCULATED.3IONS-SCNC: 12 MMOL/L (ref 7–15)
APPEARANCE UR: CLEAR
BILIRUB UR QL STRIP: NEGATIVE
BUN SERPL-MCNC: 3.8 MG/DL (ref 6–20)
CALCIUM SERPL-MCNC: 8.7 MG/DL (ref 8.6–10)
CHLORIDE SERPL-SCNC: 101 MMOL/L (ref 98–107)
COLOR UR AUTO: ABNORMAL
CREAT SERPL-MCNC: 0.67 MG/DL (ref 0.67–1.17)
DEPRECATED HCO3 PLAS-SCNC: 24 MMOL/L (ref 22–29)
ERYTHROCYTE [DISTWIDTH] IN BLOOD BY AUTOMATED COUNT: 13.2 % (ref 10–15)
GFR SERPL CREATININE-BSD FRML MDRD: >90 ML/MIN/1.73M2
GLUCOSE BLDC GLUCOMTR-MCNC: 138 MG/DL (ref 70–99)
GLUCOSE BLDC GLUCOMTR-MCNC: 67 MG/DL (ref 70–99)
GLUCOSE BLDC GLUCOMTR-MCNC: 91 MG/DL (ref 70–99)
GLUCOSE BLDC GLUCOMTR-MCNC: 91 MG/DL (ref 70–99)
GLUCOSE BLDC GLUCOMTR-MCNC: 97 MG/DL (ref 70–99)
GLUCOSE SERPL-MCNC: 103 MG/DL (ref 70–99)
GLUCOSE UR STRIP-MCNC: NEGATIVE MG/DL
HCT VFR BLD AUTO: 32.2 % (ref 40–53)
HGB BLD-MCNC: 10.9 G/DL (ref 13.3–17.7)
HGB UR QL STRIP: NEGATIVE
KETONES UR STRIP-MCNC: 10 MG/DL
LEUKOCYTE ESTERASE UR QL STRIP: NEGATIVE
MAGNESIUM SERPL-MCNC: 1.7 MG/DL (ref 1.7–2.3)
MCH RBC QN AUTO: 29.5 PG (ref 26.5–33)
MCHC RBC AUTO-ENTMCNC: 33.9 G/DL (ref 31.5–36.5)
MCV RBC AUTO: 87 FL (ref 78–100)
NITRATE UR QL: NEGATIVE
PH UR STRIP: 6.5 [PH] (ref 5–7)
PHOSPHATE SERPL-MCNC: 1.3 MG/DL (ref 2.5–4.5)
PHOSPHATE SERPL-MCNC: 3.2 MG/DL (ref 2.5–4.5)
PHOSPHATE SERPL-MCNC: 3.6 MG/DL (ref 2.5–4.5)
PLATELET # BLD AUTO: 350 10E3/UL (ref 150–450)
POTASSIUM SERPL-SCNC: 3.3 MMOL/L (ref 3.4–5.3)
POTASSIUM SERPL-SCNC: 4.1 MMOL/L (ref 3.4–5.3)
RBC # BLD AUTO: 3.7 10E6/UL (ref 4.4–5.9)
RBC URINE: <1 /HPF
SODIUM SERPL-SCNC: 137 MMOL/L (ref 136–145)
SP GR UR STRIP: 1.01 (ref 1–1.03)
UROBILINOGEN UR STRIP-MCNC: NORMAL MG/DL
WBC # BLD AUTO: 14.9 10E3/UL (ref 4–11)
WBC URINE: 1 /HPF

## 2023-07-10 PROCEDURE — 93970 EXTREMITY STUDY: CPT

## 2023-07-10 PROCEDURE — 80048 BASIC METABOLIC PNL TOTAL CA: CPT | Performed by: STUDENT IN AN ORGANIZED HEALTH CARE EDUCATION/TRAINING PROGRAM

## 2023-07-10 PROCEDURE — 250N000009 HC RX 250: Performed by: HOSPITALIST

## 2023-07-10 PROCEDURE — 84132 ASSAY OF SERUM POTASSIUM: CPT | Performed by: STUDENT IN AN ORGANIZED HEALTH CARE EDUCATION/TRAINING PROGRAM

## 2023-07-10 PROCEDURE — 36415 COLL VENOUS BLD VENIPUNCTURE: CPT | Performed by: STUDENT IN AN ORGANIZED HEALTH CARE EDUCATION/TRAINING PROGRAM

## 2023-07-10 PROCEDURE — 74177 CT ABD & PELVIS W/CONTRAST: CPT

## 2023-07-10 PROCEDURE — 250N000011 HC RX IP 250 OP 636: Performed by: STUDENT IN AN ORGANIZED HEALTH CARE EDUCATION/TRAINING PROGRAM

## 2023-07-10 PROCEDURE — 81001 URINALYSIS AUTO W/SCOPE: CPT | Performed by: STUDENT IN AN ORGANIZED HEALTH CARE EDUCATION/TRAINING PROGRAM

## 2023-07-10 PROCEDURE — 258N000003 HC RX IP 258 OP 636: Performed by: STUDENT IN AN ORGANIZED HEALTH CARE EDUCATION/TRAINING PROGRAM

## 2023-07-10 PROCEDURE — 87040 BLOOD CULTURE FOR BACTERIA: CPT | Performed by: STUDENT IN AN ORGANIZED HEALTH CARE EDUCATION/TRAINING PROGRAM

## 2023-07-10 PROCEDURE — 99291 CRITICAL CARE FIRST HOUR: CPT | Performed by: STUDENT IN AN ORGANIZED HEALTH CARE EDUCATION/TRAINING PROGRAM

## 2023-07-10 PROCEDURE — 999N000157 HC STATISTIC RCP TIME EA 10 MIN

## 2023-07-10 PROCEDURE — 70450 CT HEAD/BRAIN W/O DYE: CPT

## 2023-07-10 PROCEDURE — 84100 ASSAY OF PHOSPHORUS: CPT | Performed by: STUDENT IN AN ORGANIZED HEALTH CARE EDUCATION/TRAINING PROGRAM

## 2023-07-10 PROCEDURE — 250N000009 HC RX 250: Performed by: STUDENT IN AN ORGANIZED HEALTH CARE EDUCATION/TRAINING PROGRAM

## 2023-07-10 PROCEDURE — 83735 ASSAY OF MAGNESIUM: CPT | Performed by: STUDENT IN AN ORGANIZED HEALTH CARE EDUCATION/TRAINING PROGRAM

## 2023-07-10 PROCEDURE — C9113 INJ PANTOPRAZOLE SODIUM, VIA: HCPCS | Performed by: INTERNAL MEDICINE

## 2023-07-10 PROCEDURE — 250N000011 HC RX IP 250 OP 636: Performed by: HOSPITALIST

## 2023-07-10 PROCEDURE — 250N000011 HC RX IP 250 OP 636: Mod: JZ

## 2023-07-10 PROCEDURE — 85027 COMPLETE CBC AUTOMATED: CPT | Performed by: STUDENT IN AN ORGANIZED HEALTH CARE EDUCATION/TRAINING PROGRAM

## 2023-07-10 PROCEDURE — 200N000001 HC R&B ICU

## 2023-07-10 PROCEDURE — 99254 IP/OBS CNSLTJ NEW/EST MOD 60: CPT

## 2023-07-10 PROCEDURE — 250N000013 HC RX MED GY IP 250 OP 250 PS 637: Performed by: STUDENT IN AN ORGANIZED HEALTH CARE EDUCATION/TRAINING PROGRAM

## 2023-07-10 PROCEDURE — 250N000011 HC RX IP 250 OP 636: Performed by: INTERNAL MEDICINE

## 2023-07-10 PROCEDURE — 258N000001 HC RX 258: Performed by: INTERNAL MEDICINE

## 2023-07-10 RX ORDER — LEVOFLOXACIN 5 MG/ML
500 INJECTION, SOLUTION INTRAVENOUS EVERY 24 HOURS
Status: COMPLETED | OUTPATIENT
Start: 2023-07-10 | End: 2023-07-14

## 2023-07-10 RX ORDER — HYDROMORPHONE HYDROCHLORIDE 2 MG/1
2 TABLET ORAL
Status: DISCONTINUED | OUTPATIENT
Start: 2023-07-10 | End: 2023-07-13

## 2023-07-10 RX ORDER — IOPAMIDOL 755 MG/ML
86 INJECTION, SOLUTION INTRAVASCULAR ONCE
Status: COMPLETED | OUTPATIENT
Start: 2023-07-10 | End: 2023-07-10

## 2023-07-10 RX ORDER — ACETAMINOPHEN 325 MG/1
650 TABLET ORAL EVERY 4 HOURS PRN
Status: DISCONTINUED | OUTPATIENT
Start: 2023-07-10 | End: 2023-07-14 | Stop reason: HOSPADM

## 2023-07-10 RX ORDER — POTASSIUM CHLORIDE 29.8 MG/ML
20 INJECTION INTRAVENOUS
Status: COMPLETED | OUTPATIENT
Start: 2023-07-10 | End: 2023-07-10

## 2023-07-10 RX ORDER — ACETAMINOPHEN 650 MG/1
650 SUPPOSITORY RECTAL EVERY 4 HOURS PRN
Status: DISCONTINUED | OUTPATIENT
Start: 2023-07-10 | End: 2023-07-14 | Stop reason: HOSPADM

## 2023-07-10 RX ORDER — HYDROMORPHONE HYDROCHLORIDE 2 MG/1
4 TABLET ORAL
Status: DISCONTINUED | OUTPATIENT
Start: 2023-07-10 | End: 2023-07-13

## 2023-07-10 RX ADMIN — DEXMEDETOMIDINE HYDROCHLORIDE 1 MCG/KG/HR: 400 INJECTION INTRAVENOUS at 03:12

## 2023-07-10 RX ADMIN — PANTOPRAZOLE SODIUM 40 MG: 40 INJECTION, POWDER, FOR SOLUTION INTRAVENOUS at 09:00

## 2023-07-10 RX ADMIN — POTASSIUM CHLORIDE 20 MEQ: 29.8 INJECTION, SOLUTION INTRAVENOUS at 01:42

## 2023-07-10 RX ADMIN — ACETAMINOPHEN 650 MG: 650 SUPPOSITORY RECTAL at 20:48

## 2023-07-10 RX ADMIN — DEXMEDETOMIDINE HYDROCHLORIDE 1.2 MCG/KG/HR: 400 INJECTION INTRAVENOUS at 12:42

## 2023-07-10 RX ADMIN — DEXMEDETOMIDINE HYDROCHLORIDE 1 MCG/KG/HR: 400 INJECTION INTRAVENOUS at 20:56

## 2023-07-10 RX ADMIN — HYDROMORPHONE HYDROCHLORIDE 0.5 MG: 1 INJECTION, SOLUTION INTRAMUSCULAR; INTRAVENOUS; SUBCUTANEOUS at 10:12

## 2023-07-10 RX ADMIN — ENOXAPARIN SODIUM 40 MG: 40 INJECTION SUBCUTANEOUS at 13:51

## 2023-07-10 RX ADMIN — HALOPERIDOL LACTATE 5 MG: 5 INJECTION, SOLUTION INTRAMUSCULAR at 02:48

## 2023-07-10 RX ADMIN — POTASSIUM CHLORIDE 20 MEQ: 29.8 INJECTION, SOLUTION INTRAVENOUS at 03:28

## 2023-07-10 RX ADMIN — HYDROMORPHONE HYDROCHLORIDE 0.5 MG: 1 INJECTION, SOLUTION INTRAMUSCULAR; INTRAVENOUS; SUBCUTANEOUS at 20:57

## 2023-07-10 RX ADMIN — PHENOBARBITAL SODIUM 64.8 MG: 65 INJECTION INTRAMUSCULAR; INTRAVENOUS at 10:28

## 2023-07-10 RX ADMIN — DIAZEPAM 10 MG: 5 INJECTION INTRAMUSCULAR; INTRAVENOUS at 00:06

## 2023-07-10 RX ADMIN — DIAZEPAM 10 MG: 5 INJECTION INTRAMUSCULAR; INTRAVENOUS at 09:39

## 2023-07-10 RX ADMIN — HYDROMORPHONE HYDROCHLORIDE 0.5 MG: 1 INJECTION, SOLUTION INTRAMUSCULAR; INTRAVENOUS; SUBCUTANEOUS at 04:47

## 2023-07-10 RX ADMIN — SODIUM PHOSPHATE, MONOBASIC, MONOHYDRATE AND SODIUM PHOSPHATE, DIBASIC, ANHYDROUS 15 MMOL: 142; 276 INJECTION, SOLUTION INTRAVENOUS at 06:13

## 2023-07-10 RX ADMIN — IOPAMIDOL 86 ML: 755 INJECTION, SOLUTION INTRAVENOUS at 11:16

## 2023-07-10 RX ADMIN — ACETAMINOPHEN 650 MG: 650 SUPPOSITORY RECTAL at 09:09

## 2023-07-10 RX ADMIN — PANTOPRAZOLE SODIUM 40 MG: 40 INJECTION, POWDER, FOR SOLUTION INTRAVENOUS at 20:48

## 2023-07-10 RX ADMIN — HYDROMORPHONE HYDROCHLORIDE 0.5 MG: 1 INJECTION, SOLUTION INTRAMUSCULAR; INTRAVENOUS; SUBCUTANEOUS at 01:06

## 2023-07-10 RX ADMIN — IPRATROPIUM BROMIDE AND ALBUTEROL SULFATE 3 ML: .5; 3 SOLUTION RESPIRATORY (INHALATION) at 04:43

## 2023-07-10 RX ADMIN — DIAZEPAM 10 MG: 5 INJECTION INTRAMUSCULAR; INTRAVENOUS at 03:00

## 2023-07-10 RX ADMIN — DEXMEDETOMIDINE HYDROCHLORIDE 1.2 MCG/KG/HR: 400 INJECTION INTRAVENOUS at 09:01

## 2023-07-10 RX ADMIN — POTASSIUM CHLORIDE: 2 INJECTION, SOLUTION, CONCENTRATE INTRAVENOUS at 13:51

## 2023-07-10 RX ADMIN — QUETIAPINE FUMARATE 25 MG: 25 TABLET ORAL at 04:50

## 2023-07-10 RX ADMIN — MIDAZOLAM 2 MG: 1 INJECTION INTRAMUSCULAR; INTRAVENOUS at 11:10

## 2023-07-10 RX ADMIN — FOLIC ACID 1 MG: 5 INJECTION, SOLUTION INTRAMUSCULAR; INTRAVENOUS; SUBCUTANEOUS at 09:00

## 2023-07-10 RX ADMIN — LEVOFLOXACIN 500 MG: 500 INJECTION, SOLUTION INTRAVENOUS at 10:15

## 2023-07-10 RX ADMIN — DIAZEPAM 10 MG: 5 INJECTION INTRAMUSCULAR; INTRAVENOUS at 09:09

## 2023-07-10 RX ADMIN — SODIUM CHLORIDE 66 ML: 9 INJECTION, SOLUTION INTRAVENOUS at 11:16

## 2023-07-10 RX ADMIN — CEFEPIME HYDROCHLORIDE 2 G: 2 INJECTION, POWDER, FOR SOLUTION INTRAVENOUS at 04:41

## 2023-07-10 RX ADMIN — DEXTROSE MONOHYDRATE 50 ML: 25 INJECTION, SOLUTION INTRAVENOUS at 20:48

## 2023-07-10 RX ADMIN — THIAMINE HYDROCHLORIDE 250 MG: 100 INJECTION, SOLUTION INTRAMUSCULAR; INTRAVENOUS at 09:10

## 2023-07-10 RX ADMIN — DEXMEDETOMIDINE HYDROCHLORIDE 1.2 MCG/KG/HR: 400 INJECTION INTRAVENOUS at 16:44

## 2023-07-10 ASSESSMENT — ACTIVITIES OF DAILY LIVING (ADL)
ADLS_ACUITY_SCORE: 45
ADLS_ACUITY_SCORE: 49
ADLS_ACUITY_SCORE: 41
ADLS_ACUITY_SCORE: 45
ADLS_ACUITY_SCORE: 41
ADLS_ACUITY_SCORE: 45

## 2023-07-10 NOTE — PROCEDURES
[facility]    Triple Lumen PICC Placement    Date/Time: 7/9/2023 7:28 PM    Performed by: Selma Thrasher RN  Authorized by: Wilbur Gatica MD  Indications: vascular access      UNIVERSAL PROTOCOL   Site Marked: Yes (PICC exchange for current midline)  Prior Images Obtained and Reviewed:  Yes  Required items: Required blood products, implants, devices and special equipment available    Patient identity confirmed:  Verbally with patient, arm band, provided demographic data and hospital-assigned identification number  NA - No sedation, light sedation, or local anesthesia  Confirmation Checklist:  Patient's identity using two indicators, relevant allergies, procedure was appropriate and matched the consent or emergent situation and correct equipment/implants were available  Time out: Immediately prior to the procedure a time out was called    Universal Protocol: the Joint Commission Universal Protocol was followed    Preparation: Patient was prepped and draped in usual sterile fashion      SEDATION    Patient Sedated: No        Preparation: skin prepped with 2% chlorhexidine  Skin prep agent: skin prep agent completely dried prior to procedure  Sterile barriers: maximum sterile barriers were used: cap, mask, sterile gown, sterile gloves, and large sterile sheet  Hand hygiene: hand hygiene performed prior to central venous catheter insertion  Type of line used: PICC  Catheter type: triple lumen  Lumen type: valved and power PICC  Lumen Identification: White, Gray and Red  Catheter size: 5 Fr  Brand: Bard  Lot number: [id number]  Placement method: MST, ultrasound and tip navigation system  Number of attempts: 1  Difficulty threading catheter: no  Successful placement: yes  Orientation: right    Location: basilic vein  Tip Location: SVC/RA Junction  Arm circumference: adults 10 cm  Extremity circumference: 31  Visible catheter length: 6  Total catheter length: 41  Dressing and  securement: alcohol impregnated caps, chlorhexidine disc applied, gloves changed prior to final dressing, sterile dressing applied and subcutaneous anchor securement system  Post procedure assessment: blood return through all ports and placement verified by 3CG technology  PROCEDURE   Patient Tolerance:  Patient tolerated the procedure well with no immediate complicationsDescribe Procedure: Over-wire exchange of current midline catheter for triple lumen PICC.  Procedure without complications, immediate 3CG confirmation at SVC /RA junction.  Disposal: sharps and needle count correct at the end of procedure, needles and guidewire disposed in sharps container

## 2023-07-10 NOTE — CONSULTS
[facility] GASTROENTEROLOGY CONSULTATION     Dariusz Ferreira  [address] [facility] [address]  42 year old male    Admission Date/Time: 7/4/2023  Primary Care Provider: Casimiro, [facility]    We were asked to see the patient in consultation by Dr. Gatica for pancreatitis, abnormal CT of the pancreas.        HPI:  Dariusz Ferreira is a 42 year old male who was admitted to [facility] 7/4/23 with alcohol withdrawal and abdominal pain.  He suffered from a withdrawal seizure on the day of admission.    He was transferred to the ICU due to continued delirium thought likely related to his EtOH withdrawal. He has had ongoing fevers and is on antibiotics for possible aspiration pneumonia.    He has not been alert enough to eat or drink. CT on admission showed pancreatitis and possible fluid collection v. Pancreatic lesion in the tail of the pancreas.  Repeat CT today with worsening pancreatitis and new SMV and PV thrombus.    ROS: A comprehensive ten point review of systems was negative aside from those in mentioned in the HPI.      MEDICATIONS: No current facility-administered medications on file prior to encounter.  buPROPion (WELLBUTRIN XL) 300 MG 24 hr tablet, Take 300 mg by mouth every morning  finasteride (PROPECIA) 1 MG tablet, Take 1 mg by mouth daily  fluvoxaMINE (LUVOX) 100 MG tablet, Take 200 mg by mouth At Bedtime  hydrOXYzine (ATARAX) 50 MG tablet, Take 25-50 mg by mouth nightly as needed for other (sleep)  lamoTRIgine (LAMICTAL) 100 MG tablet, Take 300 mg by mouth daily  tadalafil (CIALIS) 20 MG tablet, Take 20 mg by mouth daily as needed        ALLERGIES:   Allergies   Allergen Reactions     Penicillins      Erythromycin Rash           Past Surgical History:   Procedure Laterality Date     mucous cyst removed from tongue  2003     TONSILLECTOMY  1987         SOCIAL HISTORY:  Social History     Tobacco Use     Smoking status: Former     Packs/day: 0.50     Years: 8.00     Pack years: 4.00      "Types: Cigarettes     Smokeless tobacco: Never     Tobacco comments:     5/28/13 - using chantix, smokes when he drinks   Substance Use Topics     Alcohol use: Yes     Comment: review     Drug use: No     Comment: Used to use cannabis, meth.       FAMILY HISTORY:  No known family history of GI disease.    PHYSICAL EXAM:   /68   Pulse 73   Temp 99.3  F (37.4  C) (Axillary)   Resp 17   Ht 1.702 m (5' 7\")   Wt 78.8 kg (173 lb 11.6 oz)   SpO2 95%   BMI 27.21 kg/m      Constitutional: NAD, sleepy, not oriented  Cardiovascular: RRR  Respiratory: CTAB  Psychiatric: fatigued  Head: Atraumatic.    Neck:  normal size, trachea midline  Eyes:  no icterus  ENT: hearing adequate, wakes to voice  Abdomen:   Auscultation: bowel sounds heard  Appearance: non distended  Palpation: soft  NEURO: not alert  SKIN: no suspicious lesions or rashes            ADDITIONAL COMMENTS:   I reviewed the patient's new clinical lab test results.   Recent Labs   Lab Test 07/10/23  0528 07/09/23  0531 07/08/23  0654   WBC 14.9* 11.6* 9.1   HGB 10.9* 10.7* 11.2*   MCV 87 87 87    263 214     Recent Labs   Lab Test 07/10/23  1138 07/10/23  0900 07/10/23  0528 07/10/23  0047 07/09/23  1616 07/09/23  0531 07/08/23  1541 07/08/23  0648   NA  --   --  137  --   --  139  --  140   POTASSIUM  --   --  4.1 3.3* 3.2* 3.3*   < > 3.3*   CHLORIDE  --   --  101  --   --  103  --  105   CO2  --   --  24  --   --  23  --  24   BUN  --   --  3.8*  --   --  3.7*  --  3.9*   CR  --   --  0.67  --   --  0.68  --  0.69   ANIONGAP  --   --  12  --   --  13  --  11   KERRIE  --   --  8.7  --   --  8.5*  --  8.3*   GLC 91 97 103*  --   --  114*  --  113*    < > = values in this interval not displayed.     Recent Labs   Lab Test 07/06/23  0456 07/05/23  0501 07/04/23  0819 07/04/23  0054 07/03/23  2358 04/10/23  1804 10/30/21  0318 12/02/20  2130 09/05/16  1938 03/25/16  1903 03/25/16  1825   ALBUMIN 2.7* 2.9* 3.6   < >  --  4.9 3.6   < >  --    < >  --  "   BILITOTAL 0.7 0.6 1.1   < >  --  0.6 1.0   < >  --    < >  --    ALT 32 47 85*   < >  --  73* 57   < >  --    < >  --    AST 29 38 71*   < >  --  59* 34   < >  --    < >  --    ALKPHOS 58 59 70   < >  --  99 94   < >  --    < >  --    PROTEIN  --   --   --   --   --   --   --   --  Negative  --  10*   LIPASE  --   --   --   --  1,083* 80* 131  --   --   --   --     < > = values in this interval not displayed.       7/4/23 CT:  1.  Acute interstitial edematous pancreatitis of the pancreas with associated cystic dystrophy of the proximal duodenal wall. No drainable peripancreatic fluid collections   2.  Small 2.2 cm hypoattenuating focus in the pancreatic tail, indeterminate for a small, organized peripancreatic fluid collection versus a pancreatic lesion. Recommend follow-up pancreatic protocol MRI in 4-6 weeks for further characterization after resolution of current episode of acute pancreatitis.  3.  Mild wall thickening of the distal esophagus suggesting esophagitis.   4.  Moderate hepatic steatosis.    7/10/23 CT:  1.  Worsening acute interstitial pancreatitis compared to 7/4/2023.  2.  New short segment thrombus in the superior mesenteric and proximal  portal vein secondary to #1.  3.  New small bilateral pleural effusions and bilateral lower lobe  opacities, atelectasis versus pneumonitis.      CONSULTATION ASSESSMENT AND PLAN:    Principal Problem:      Active Problems:    Alcohol-induced acute pancreatitis, unspecified complication status    Abnormal CT, pancreatic lesion    Assessment: Patient admitted 7/4/23 with pancreatitis and EtOH withdrawal with seizure day of admission.  Has been to treatment multiple times in the past for EtOH dependence.  Currently in the ICU due to complex management of EtOH withdrawal. CT on admission noted possible pancreatic fluid collection v. pancreatic lesion.  Repeat CT 7/10/23 with worsening of pancreatitis, new, acute SMV and PV thrombus.    Fever could be due to severe  pancreatitis.  Also being treated for possible aspiration pneumonia. Receiving thiamine and folic acid.    We will arrange an outpatient EUS in about 6 weeks to better see the pancreas and rule out any lesion.  Regarding the thrombus, this is very likely related to the acute pancreatitis and should improve with improvement of the pancreatitis.  No clear guidelines on anticoagulation, but would hold off for now as likely to improve on its own.  If worsening of abdominal pain would recommend re-imaging, consideration of anticoagulation of clot is worsened at that time.       Plan:  - if no improvement in mental status consider placement of keofeed in next day or so to initiate enteric feeding  - if evidence of worsening abdominal pain, consider repeating CT in coming days which will also assess thrombus  - recommend no anticoagulation for now, thrombus should improve with improvement on pancreatitis  - tentative plan for outpatient EUS in about 6 weeks to rule out pancreatic tail lesion      Zita Gill MD  [facility]  Office:  [phone]    Approximately 50 minutes of total time was spent providing patient care, including patient evaluation, reviewing documentation/test results, and .

## 2023-07-10 NOTE — CONSULTS
Initial Psychiatric Consult   Consult date: July 10, 2023         Reason for Consult, requesting source:    Ongoing confusion, agitation  Requesting source: Wilbur Gatica    Labs and imaging reviewed. Discussed with nursing.        HPI:   Dariusz Ferreira is a 42 year old male admitted on 7/4/2023. He presents with alcohol withdrawal complicated by withdrawal seizure. He was admitted to the ICU where he remains on Precedex drip with ongoing agitation.    I attempted to meet with Dariusz today, he is seen in ICU bed sedated after recently receiving IV Versed, does not rouse to voice or touch of his arm. He is in restraints. Sitter at bedside tells me he was very restless earlier, pulling at lines, impulsive. Nursing reports he has been sleeping since Versed was given.        Past Psychiatric History:   He is unable to participate in assessment. The following is gathered from chart review:    History of substance abuse, major depressive disorder, generalized anxiety disorder.    History of past psychiatric admission in 2022. 5 prior suicide attempts as of last outpatient psychiatry note dated 9/26/2019.    PTA medications include bupropion XL 300mg daily, fluvoxamine 200mg daily, and lamotrigine 300mg daily.        Substance Use and History:   History of alcohol, benzodiazepine, and methamphetamine abuse, per chart review    Does have a history of delirium tremens with alcohol withdrawal in 2019 hospital admission.        Past Medical History:   PAST MEDICAL HISTORY:   Past Medical History:   Diagnosis Date     ADHD (attention deficit hyperactivity disorder)      Anxiety      Depressive disorder      Hypertension      Medical history unknown     ** Patient takes Truvada but states he is not HIV positive.  He takes it because his  is HIV positive.     Mild intermittent asthma        PAST SURGICAL HISTORY:   Past Surgical History:   Procedure Laterality Date     mucous cyst removed from tongue  2003      TONSILLECTOMY  1987             Family History:   FAMILY HISTORY:   Family History   Problem Relation Age of Onset     Hypertension Mother      Alcohol/Drug Mother      Blood Disease Mother         anemia     Depression Mother      Respiratory Mother         asthma     Cerebrovascular Disease Mother      Lung Cancer Mother      Alcohol/Drug Father      Alcohol/Drug Sister      Thyroid Disease Sister         uncertain what type, sounds like Grave's disease     Cerebrovascular Disease Maternal Grandfather            Social History:   SOCIAL HISTORY:   Social History     Tobacco Use     Smoking status: Former     Packs/day: 0.50     Years: 8.00     Pack years: 4.00     Types: Cigarettes     Smokeless tobacco: Never     Tobacco comments:     5/28/13 - using chantix, smokes when he drinks   Substance Use Topics     Alcohol use: Yes     Comment: review       Unable to assess.         Physical ROS:   The 10 point Review of Systems is negative other than noted in the HPI or here.    Review Of Systems  SHIV         Medications:       enoxaparin ANTICOAGULANT  40 mg Subcutaneous Q24H     folic acid  1 mg Intravenous Daily     levofloxacin  500 mg Intravenous Q24H     pantoprazole  40 mg Intravenous BID     PHENobarbital (LUMINAL) 32.4 mg in sodium chloride 0.9 % 100.5 mL intermittent infusion  32.4 mg Intravenous Q12H    Followed by     [START ON 7/12/2023] PHENobarbital (LUMINAL) 32.4 mg in sodium chloride 0.9 % 100.5 mL intermittent infusion  32.4 mg Intravenous Q24H     sodium chloride (PF)  10 mL Intracatheter Q8H     sodium chloride (PF)  10-40 mL Intracatheter Q7 Days     sodium chloride (PF)  3 mL Intracatheter Q8H     thiamine  250 mg Intravenous Daily    Followed by     [START ON 7/15/2023] thiamine  100 mg Oral Daily              Allergies:     Allergies   Allergen Reactions     Penicillins      Erythromycin Rash          Labs:     Recent Results (from the past 48 hour(s))   Potassium    Collection Time: 07/08/23  11:27 PM   Result Value Ref Range    Potassium 3.8 3.4 - 5.3 mmol/L   Magnesium    Collection Time: 07/09/23  5:31 AM   Result Value Ref Range    Magnesium 1.8 1.7 - 2.3 mg/dL   Phosphorus    Collection Time: 07/09/23  5:31 AM   Result Value Ref Range    Phosphorus 1.6 (L) 2.5 - 4.5 mg/dL   CBC with platelets    Collection Time: 07/09/23  5:31 AM   Result Value Ref Range    WBC Count 11.6 (H) 4.0 - 11.0 10e3/uL    RBC Count 3.64 (L) 4.40 - 5.90 10e6/uL    Hemoglobin 10.7 (L) 13.3 - 17.7 g/dL    Hematocrit 31.5 (L) 40.0 - 53.0 %    MCV 87 78 - 100 fL    MCH 29.4 26.5 - 33.0 pg    MCHC 34.0 31.5 - 36.5 g/dL    RDW 13.1 10.0 - 15.0 %    Platelet Count 263 150 - 450 10e3/uL   Basic metabolic panel    Collection Time: 07/09/23  5:31 AM   Result Value Ref Range    Sodium 139 136 - 145 mmol/L    Potassium 3.3 (L) 3.4 - 5.3 mmol/L    Chloride 103 98 - 107 mmol/L    Carbon Dioxide (CO2) 23 22 - 29 mmol/L    Anion Gap 13 7 - 15 mmol/L    Urea Nitrogen 3.7 (L) 6.0 - 20.0 mg/dL    Creatinine 0.68 0.67 - 1.17 mg/dL    Calcium 8.5 (L) 8.6 - 10.0 mg/dL    Glucose 114 (H) 70 - 99 mg/dL    GFR Estimate >90 >60 mL/min/1.73m2   Phosphorus    Collection Time: 07/09/23  4:16 PM   Result Value Ref Range    Phosphorus 1.9 (L) 2.5 - 4.5 mg/dL   Potassium    Collection Time: 07/09/23  4:16 PM   Result Value Ref Range    Potassium 3.2 (L) 3.4 - 5.3 mmol/L   Potassium    Collection Time: 07/10/23 12:47 AM   Result Value Ref Range    Potassium 3.3 (L) 3.4 - 5.3 mmol/L   Phosphorus    Collection Time: 07/10/23 12:47 AM   Result Value Ref Range    Phosphorus 3.2 2.5 - 4.5 mg/dL   CBC with platelets    Collection Time: 07/10/23  5:28 AM   Result Value Ref Range    WBC Count 14.9 (H) 4.0 - 11.0 10e3/uL    RBC Count 3.70 (L) 4.40 - 5.90 10e6/uL    Hemoglobin 10.9 (L) 13.3 - 17.7 g/dL    Hematocrit 32.2 (L) 40.0 - 53.0 %    MCV 87 78 - 100 fL    MCH 29.5 26.5 - 33.0 pg    MCHC 33.9 31.5 - 36.5 g/dL    RDW 13.2 10.0 - 15.0 %    Platelet Count 350 150  - 450 10e3/uL   Basic metabolic panel    Collection Time: 07/10/23  5:28 AM   Result Value Ref Range    Sodium 137 136 - 145 mmol/L    Potassium 4.1 3.4 - 5.3 mmol/L    Chloride 101 98 - 107 mmol/L    Carbon Dioxide (CO2) 24 22 - 29 mmol/L    Anion Gap 12 7 - 15 mmol/L    Urea Nitrogen 3.8 (L) 6.0 - 20.0 mg/dL    Creatinine 0.67 0.67 - 1.17 mg/dL    Calcium 8.7 8.6 - 10.0 mg/dL    Glucose 103 (H) 70 - 99 mg/dL    GFR Estimate >90 >60 mL/min/1.73m2   Magnesium    Collection Time: 07/10/23  5:28 AM   Result Value Ref Range    Magnesium 1.7 1.7 - 2.3 mg/dL   Phosphorus    Collection Time: 07/10/23  5:28 AM   Result Value Ref Range    Phosphorus 1.3 (L) 2.5 - 4.5 mg/dL   Glucose by meter    Collection Time: 07/10/23  9:00 AM   Result Value Ref Range    GLUCOSE BY METER POCT 97 70 - 99 mg/dL   Glucose by meter    Collection Time: 07/10/23 11:38 AM   Result Value Ref Range    GLUCOSE BY METER POCT 91 70 - 99 mg/dL   Phosphorus    Collection Time: 07/10/23  1:38 PM   Result Value Ref Range    Phosphorus 3.6 2.5 - 4.5 mg/dL   UA Macroscopic with reflex to Microscopic and Culture    Collection Time: 07/10/23  1:39 PM    Specimen: Urine, Clean Catch   Result Value Ref Range    Color Urine Light Yellow Colorless, Straw, Light Yellow, Yellow    Appearance Urine Clear Clear    Glucose Urine Negative Negative mg/dL    Bilirubin Urine Negative Negative    Ketones Urine 10 (A) Negative mg/dL    Specific Gravity Urine 1.010 1.003 - 1.035    Blood Urine Negative Negative    pH Urine 6.5 5.0 - 7.0    Protein Albumin Urine 10 (A) Negative mg/dL    Urobilinogen Urine Normal Normal, 2.0 mg/dL    Nitrite Urine Negative Negative    Leukocyte Esterase Urine Negative Negative    RBC Urine <1 <=2 /HPF    WBC Urine 1 <=5 /HPF   Glucose by meter    Collection Time: 07/10/23  4:52 PM   Result Value Ref Range    GLUCOSE BY METER POCT 91 70 - 99 mg/dL          Physical and Psychiatric Examination:     BP (!) 144/114   Pulse 89   Temp 97.9  F  "(36.6  C) (Oral)   Resp 21   Ht 1.702 m (5' 7\")   Wt 78.8 kg (173 lb 11.6 oz)   SpO2 96%   BMI 27.21 kg/m    Weight is 173 lbs 11.56 oz  Body mass index is 27.21 kg/m .    Physical Exam:  I have reviewed the physical exam as documented by by the medical team and agree with findings and assessment and have no additional findings to add at this time.    Mental Status Exam:    Appearance: sedated, laying in bed in restraints  Attitude:  sedated  Eye Contact:  sleeping/sedated  Mood:  SHIV  Affect:  sleeping  Speech:  SHIV  Psychomotor Behavior:  no evidence of tardive dyskinesia, dystonia, or tics  Thought Process:  SHIV  Associations:  SHIV  Thought Content:  SHIV  Insight:  SHIV  Judgement:  SHIV  Oriented to:  SHIV  Attention Span and Concentration:  SHIV  Recent and Remote Memory:  SHIV             DSM-5 Diagnosis:   Delirium tremens vs delirium due to another cause (possibly benzodiazepine use, or infection)  Alcohol use disorder, severe  MDD by history  MICHAEL by history            Assessment:   Dariusz LEON Hanna is a 42 year old male with a history of alcohol abuse who presented for alcohol withdrawal on 7/4. Remains in ICU due to ongoing agitation with attempts to wean Precedex. He is sedated on my assessment, unable to participate at all. Nursing reports restlessness, pulling at lines prompting restraints earlier today.    Unclear at this time if this is prolonged alcohol withdrawal with delirium tremens, or if he may be experiencing delirium due to another cause that could be worsened or prolonged by ongoing benzo use. Consider tapering off benzo to see if mental status improves. He is also on a phenobarb taper currently, which will help with both alcohol and benzo withdrawal.     Another option to consider for agitation with Precedex weaning might be scheduled clonidine. If unable to take PO, it is available as a transdermal patch.     Regarding his home medications, I do not recommend resuming bupropion due to " increased risk of seizures. Unclear at this time why he is prescribed lamotrigine- presumably bipolar disorder but this is not listed as diagnosis in OP psychiatry note. Since he has not taken this at least since admission on 7/4, this should be re-initiated at typical starting dose and titration schedule (starting at 25mg for 2 weeks). Fluvoxamine should also be restarted at lower dose of 100mg nightly due to length of time since last dose.           Summary of Recommendations:   1.  Consider tapering off benzos, potential to be worsening/prolonging delirium.    2.  Consider use of clonidine either PO or transdermal patch to wean off Precedex.    3.  Regarding home medications once able to take PO:  -do not resume buproprion, contraindicated due to alcohol withdrawal seizure  -lamotrigine needs to be re-initiated at 25mg daily for 2 weeks then according to standard dosing scheduled  -restart fluvoxamine at 100mg nightly    4.  Please reconsult psychiatry as needed      DIMITRIOS Chen CNP    Consult/Liaison Psychiatry   [facility]    Contact information available via [facility] Paging/Directory  If I am not available, then [facility] CL line ([phone]) should know who is covering our consult service.

## 2023-07-10 NOTE — PROGRESS NOTES
"Patient alert and oriented to self overnight, confused on date, place and situation, cooperative at times and at other times uncooperative and attempting to kick and hit staff, very fidgety, pulling at lines and monitoring equipment. Given valium per CIWA scale, given haldol once, having some hallucinations, thinks he is talking on the phone and that someone is at the door, also given seroquel, crushed in apple sauce, tolerated well. Complains of pain  \"everywhere\" given dilaudid PRN.Had some wheezing overnight, PRN neb given by respiratory, appears to be having more WOB. Primofit in place with good output, although at times having leaking around, brief in place as well. No BM overnight. Potassium replaced.   "

## 2023-07-11 ENCOUNTER — APPOINTMENT (OUTPATIENT)
Dept: SPEECH THERAPY | Facility: CLINIC | Age: 43
DRG: 896 | End: 2023-07-11
Attending: STUDENT IN AN ORGANIZED HEALTH CARE EDUCATION/TRAINING PROGRAM
Payer: COMMERCIAL

## 2023-07-11 LAB
ALBUMIN SERPL BCG-MCNC: 2.8 G/DL (ref 3.5–5.2)
ALP SERPL-CCNC: 71 U/L (ref 40–129)
ALT SERPL W P-5'-P-CCNC: 23 U/L (ref 0–70)
ANION GAP SERPL CALCULATED.3IONS-SCNC: 8 MMOL/L (ref 7–15)
AST SERPL W P-5'-P-CCNC: 27 U/L (ref 0–45)
BILIRUB DIRECT SERPL-MCNC: <0.2 MG/DL (ref 0–0.3)
BILIRUB SERPL-MCNC: 0.4 MG/DL
BUN SERPL-MCNC: 2.9 MG/DL (ref 6–20)
CALCIUM SERPL-MCNC: 8.8 MG/DL (ref 8.6–10)
CHLORIDE SERPL-SCNC: 96 MMOL/L (ref 98–107)
CREAT SERPL-MCNC: 0.63 MG/DL (ref 0.67–1.17)
DEPRECATED HCO3 PLAS-SCNC: 26 MMOL/L (ref 22–29)
ERYTHROCYTE [DISTWIDTH] IN BLOOD BY AUTOMATED COUNT: 13 % (ref 10–15)
GFR SERPL CREATININE-BSD FRML MDRD: >90 ML/MIN/1.73M2
GLUCOSE BLDC GLUCOMTR-MCNC: 105 MG/DL (ref 70–99)
GLUCOSE BLDC GLUCOMTR-MCNC: 110 MG/DL (ref 70–99)
GLUCOSE BLDC GLUCOMTR-MCNC: 122 MG/DL (ref 70–99)
GLUCOSE BLDC GLUCOMTR-MCNC: 63 MG/DL (ref 70–99)
GLUCOSE BLDC GLUCOMTR-MCNC: 84 MG/DL (ref 70–99)
GLUCOSE BLDC GLUCOMTR-MCNC: 88 MG/DL (ref 70–99)
GLUCOSE BLDC GLUCOMTR-MCNC: 98 MG/DL (ref 70–99)
GLUCOSE SERPL-MCNC: 92 MG/DL (ref 70–99)
HCT VFR BLD AUTO: 32 % (ref 40–53)
HGB BLD-MCNC: 10.8 G/DL (ref 13.3–17.7)
MAGNESIUM SERPL-MCNC: 1.7 MG/DL (ref 1.7–2.3)
MCH RBC QN AUTO: 29.5 PG (ref 26.5–33)
MCHC RBC AUTO-ENTMCNC: 33.8 G/DL (ref 31.5–36.5)
MCV RBC AUTO: 87 FL (ref 78–100)
PHOSPHATE SERPL-MCNC: 1.7 MG/DL (ref 2.5–4.5)
PHOSPHATE SERPL-MCNC: 1.8 MG/DL (ref 2.5–4.5)
PHOSPHATE SERPL-MCNC: 4.4 MG/DL (ref 2.5–4.5)
PLATELET # BLD AUTO: 416 10E3/UL (ref 150–450)
POTASSIUM SERPL-SCNC: 4.5 MMOL/L (ref 3.4–5.3)
PROT SERPL-MCNC: 6 G/DL (ref 6.4–8.3)
RBC # BLD AUTO: 3.66 10E6/UL (ref 4.4–5.9)
SODIUM SERPL-SCNC: 130 MMOL/L (ref 136–145)
WBC # BLD AUTO: 14.8 10E3/UL (ref 4–11)

## 2023-07-11 PROCEDURE — 258N000003 HC RX IP 258 OP 636: Performed by: HOSPITALIST

## 2023-07-11 PROCEDURE — 82248 BILIRUBIN DIRECT: CPT | Performed by: STUDENT IN AN ORGANIZED HEALTH CARE EDUCATION/TRAINING PROGRAM

## 2023-07-11 PROCEDURE — 92610 EVALUATE SWALLOWING FUNCTION: CPT | Mod: GN | Performed by: SPEECH-LANGUAGE PATHOLOGIST

## 2023-07-11 PROCEDURE — 250N000011 HC RX IP 250 OP 636: Mod: JZ | Performed by: HOSPITALIST

## 2023-07-11 PROCEDURE — 250N000013 HC RX MED GY IP 250 OP 250 PS 637: Performed by: STUDENT IN AN ORGANIZED HEALTH CARE EDUCATION/TRAINING PROGRAM

## 2023-07-11 PROCEDURE — 84100 ASSAY OF PHOSPHORUS: CPT | Performed by: INTERNAL MEDICINE

## 2023-07-11 PROCEDURE — 250N000009 HC RX 250: Performed by: INTERNAL MEDICINE

## 2023-07-11 PROCEDURE — 80048 BASIC METABOLIC PNL TOTAL CA: CPT | Performed by: STUDENT IN AN ORGANIZED HEALTH CARE EDUCATION/TRAINING PROGRAM

## 2023-07-11 PROCEDURE — 250N000011 HC RX IP 250 OP 636: Performed by: INTERNAL MEDICINE

## 2023-07-11 PROCEDURE — C9113 INJ PANTOPRAZOLE SODIUM, VIA: HCPCS | Performed by: INTERNAL MEDICINE

## 2023-07-11 PROCEDURE — 84100 ASSAY OF PHOSPHORUS: CPT | Performed by: STUDENT IN AN ORGANIZED HEALTH CARE EDUCATION/TRAINING PROGRAM

## 2023-07-11 PROCEDURE — 258N000003 HC RX IP 258 OP 636: Performed by: STUDENT IN AN ORGANIZED HEALTH CARE EDUCATION/TRAINING PROGRAM

## 2023-07-11 PROCEDURE — 83735 ASSAY OF MAGNESIUM: CPT | Performed by: STUDENT IN AN ORGANIZED HEALTH CARE EDUCATION/TRAINING PROGRAM

## 2023-07-11 PROCEDURE — 200N000001 HC R&B ICU

## 2023-07-11 PROCEDURE — 250N000013 HC RX MED GY IP 250 OP 250 PS 637: Performed by: INTERNAL MEDICINE

## 2023-07-11 PROCEDURE — 92526 ORAL FUNCTION THERAPY: CPT | Mod: GN | Performed by: SPEECH-LANGUAGE PATHOLOGIST

## 2023-07-11 PROCEDURE — 258N000003 HC RX IP 258 OP 636: Performed by: INTERNAL MEDICINE

## 2023-07-11 PROCEDURE — 85027 COMPLETE CBC AUTOMATED: CPT | Performed by: STUDENT IN AN ORGANIZED HEALTH CARE EDUCATION/TRAINING PROGRAM

## 2023-07-11 PROCEDURE — 250N000011 HC RX IP 250 OP 636: Performed by: STUDENT IN AN ORGANIZED HEALTH CARE EDUCATION/TRAINING PROGRAM

## 2023-07-11 PROCEDURE — 258N000001 HC RX 258: Performed by: INTERNAL MEDICINE

## 2023-07-11 PROCEDURE — 250N000011 HC RX IP 250 OP 636

## 2023-07-11 PROCEDURE — 99291 CRITICAL CARE FIRST HOUR: CPT | Performed by: STUDENT IN AN ORGANIZED HEALTH CARE EDUCATION/TRAINING PROGRAM

## 2023-07-11 PROCEDURE — 250N000009 HC RX 250: Performed by: HOSPITALIST

## 2023-07-11 PROCEDURE — 250N000011 HC RX IP 250 OP 636: Performed by: HOSPITALIST

## 2023-07-11 PROCEDURE — 250N000009 HC RX 250: Performed by: STUDENT IN AN ORGANIZED HEALTH CARE EDUCATION/TRAINING PROGRAM

## 2023-07-11 RX ORDER — CLONIDINE HYDROCHLORIDE 0.1 MG/1
0.1 TABLET ORAL 3 TIMES DAILY
Status: DISCONTINUED | OUTPATIENT
Start: 2023-07-11 | End: 2023-07-13

## 2023-07-11 RX ORDER — CLONIDINE 0.1 MG/24H
1 PATCH, EXTENDED RELEASE TRANSDERMAL WEEKLY
Status: DISCONTINUED | OUTPATIENT
Start: 2023-07-11 | End: 2023-07-11

## 2023-07-11 RX ORDER — CLONIDINE HYDROCHLORIDE 0.1 MG/1
0.1 TABLET ORAL 2 TIMES DAILY
Status: DISCONTINUED | OUTPATIENT
Start: 2023-07-11 | End: 2023-07-11

## 2023-07-11 RX ADMIN — MELATONIN TAB 3 MG 5 MG: 3 TAB at 21:02

## 2023-07-11 RX ADMIN — LEVOFLOXACIN 500 MG: 500 INJECTION, SOLUTION INTRAVENOUS at 10:46

## 2023-07-11 RX ADMIN — DEXMEDETOMIDINE HYDROCHLORIDE 1.1 MCG/KG/HR: 400 INJECTION INTRAVENOUS at 19:43

## 2023-07-11 RX ADMIN — HYDROMORPHONE HYDROCHLORIDE 0.5 MG: 1 INJECTION, SOLUTION INTRAMUSCULAR; INTRAVENOUS; SUBCUTANEOUS at 01:43

## 2023-07-11 RX ADMIN — PHENOBARBITAL SODIUM 32.4 MG: 65 INJECTION INTRAMUSCULAR; INTRAVENOUS at 10:59

## 2023-07-11 RX ADMIN — VALPROATE SODIUM 500 MG: 100 INJECTION, SOLUTION INTRAVENOUS at 18:53

## 2023-07-11 RX ADMIN — PHENOBARBITAL SODIUM 32.4 MG: 65 INJECTION INTRAMUSCULAR; INTRAVENOUS at 00:04

## 2023-07-11 RX ADMIN — ACETAMINOPHEN 650 MG: 325 TABLET, FILM COATED ORAL at 21:02

## 2023-07-11 RX ADMIN — SODIUM PHOSPHATE, MONOBASIC, MONOHYDRATE AND SODIUM PHOSPHATE, DIBASIC, ANHYDROUS 15 MMOL: 142; 276 INJECTION, SOLUTION INTRAVENOUS at 06:05

## 2023-07-11 RX ADMIN — CLONIDINE HYDROCHLORIDE 0.1 MG: 0.1 TABLET ORAL at 17:37

## 2023-07-11 RX ADMIN — SODIUM PHOSPHATE, MONOBASIC, MONOHYDRATE AND SODIUM PHOSPHATE, DIBASIC, ANHYDROUS 15 MMOL: 142; 276 INJECTION, SOLUTION INTRAVENOUS at 16:43

## 2023-07-11 RX ADMIN — HALOPERIDOL LACTATE 5 MG: 5 INJECTION, SOLUTION INTRAMUSCULAR at 14:09

## 2023-07-11 RX ADMIN — DEXMEDETOMIDINE HYDROCHLORIDE 1 MCG/KG/HR: 400 INJECTION INTRAVENOUS at 00:06

## 2023-07-11 RX ADMIN — CLONIDINE HYDROCHLORIDE 0.1 MG: 0.1 TABLET ORAL at 21:02

## 2023-07-11 RX ADMIN — DEXMEDETOMIDINE HYDROCHLORIDE 1.1 MCG/KG/HR: 400 INJECTION INTRAVENOUS at 06:39

## 2023-07-11 RX ADMIN — PHENOBARBITAL SODIUM 32.4 MG: 65 INJECTION INTRAMUSCULAR; INTRAVENOUS at 22:30

## 2023-07-11 RX ADMIN — DEXTROSE MONOHYDRATE 25 ML: 25 INJECTION, SOLUTION INTRAVENOUS at 08:33

## 2023-07-11 RX ADMIN — POTASSIUM CHLORIDE: 2 INJECTION, SOLUTION, CONCENTRATE INTRAVENOUS at 03:24

## 2023-07-11 RX ADMIN — HALOPERIDOL LACTATE 5 MG: 5 INJECTION, SOLUTION INTRAMUSCULAR at 00:50

## 2023-07-11 RX ADMIN — QUETIAPINE FUMARATE 25 MG: 25 TABLET ORAL at 21:02

## 2023-07-11 RX ADMIN — DEXMEDETOMIDINE HYDROCHLORIDE 0.7 MCG/KG/HR: 400 INJECTION INTRAVENOUS at 12:08

## 2023-07-11 RX ADMIN — PANTOPRAZOLE SODIUM 40 MG: 40 INJECTION, POWDER, FOR SOLUTION INTRAVENOUS at 19:43

## 2023-07-11 RX ADMIN — OLANZAPINE 5 MG: 5 TABLET, ORALLY DISINTEGRATING ORAL at 13:48

## 2023-07-11 RX ADMIN — THIAMINE HYDROCHLORIDE 250 MG: 100 INJECTION, SOLUTION INTRAMUSCULAR; INTRAVENOUS at 08:34

## 2023-07-11 RX ADMIN — ENOXAPARIN SODIUM 40 MG: 40 INJECTION SUBCUTANEOUS at 13:48

## 2023-07-11 RX ADMIN — POTASSIUM CHLORIDE: 2 INJECTION, SOLUTION, CONCENTRATE INTRAVENOUS at 06:05

## 2023-07-11 RX ADMIN — FOLIC ACID 1 MG: 5 INJECTION, SOLUTION INTRAMUSCULAR; INTRAVENOUS; SUBCUTANEOUS at 08:34

## 2023-07-11 RX ADMIN — PANTOPRAZOLE SODIUM 40 MG: 40 INJECTION, POWDER, FOR SOLUTION INTRAVENOUS at 08:33

## 2023-07-11 ASSESSMENT — ACTIVITIES OF DAILY LIVING (ADL)
ADLS_ACUITY_SCORE: 47
ADLS_ACUITY_SCORE: 45
ADLS_ACUITY_SCORE: 47
ADLS_ACUITY_SCORE: 47

## 2023-07-11 NOTE — PROGRESS NOTES
"[facility] PROGRESS NOTE    SUBJECTIVE:  Patient much more alert today, denies pain, interested in eating.    OBJECTIVE:    /86   Pulse 105   Temp 98.8  F (37.1  C) (Axillary)   Resp 19   Ht 1.702 m (5' 7\")   Wt 79.3 kg (174 lb 13.2 oz)   SpO2 98%   BMI 27.38 kg/m    Temp (24hrs), Av.7  F (37.6  C), Min:97.9  F (36.6  C), Max:103.1  F (39.5  C)    Patient Vitals for the past 72 hrs:   Weight   23 0600 79.3 kg (174 lb 13.2 oz)   07/10/23 0500 78.8 kg (173 lb 11.6 oz)   23 0500 83.1 kg (183 lb 3.2 oz)       Intake/Output Summary (Last 24 hours) at 2023 1257  Last data filed at 2023 1232  Gross per 24 hour   Intake 2680.69 ml   Output 3670 ml   Net -989.31 ml         PHYSICAL EXAM    Constitutional: NAD, comfortable  Cardiovascular: RRR   Respiratory: CTAB  Abdomen: non distended, nontender to palpation        Additional Comments:  ROS, FH, SH: See initial GI consult for details.    I have reviewed the patient's new clinical lab results:    Recent Labs   Lab Test 07/11/23  0457 07/10/23  0528 23  0531   WBC 14.8* 14.9* 11.6*   HGB 10.8* 10.9* 10.7*   MCV 87 87 87    350 263     Recent Labs   Lab Test 237 07/10/23  0528 07/10/23  0047 23  1616 23  0531   * 137  --   --  139   POTASSIUM 4.5 4.1 3.3*   < > 3.3*   CHLORIDE 96* 101  --   --  103   CO2 26 24  --   --  23   BUN 2.9* 3.8*  --   --  3.7*   CR 0.63* 0.67  --   --  0.68   ANIONGAP 8 12  --   --  13   KERRIE 8.8 8.7  --   --  8.5*    < > = values in this interval not displayed.     Recent Labs   Lab Test 23  0457 07/10/23  1339 23  0456 23  0501 23  0054 23  2358 04/10/23  1804 10/30/21  0318 20  2130 16  1938 16  1903 16  1825   ALBUMIN 2.8*  --  2.7* 2.9*   < >  --  4.9 3.6   < >  --    < >  --    BILITOTAL 0.4  --  0.7 0.6   < >  --  0.6 1.0   < >  --    < >  --    ALT 23  --  32 47   < >  --  73* 57   < >  --    < >  -- "    AST 27  --  29 38   < >  --  59* 34   < >  --    < >  --    ALKPHOS 71  --  58 59   < >  --  99 94   < >  --    < >  --    PROTEIN  --  10*  --   --   --   --   --   --   --  Negative  --  10*   LIPASE  --   --   --   --   --  1,083* 80* 131  --   --   --   --     < > = values in this interval not displayed.       7/4/23 CT:  1.  Acute interstitial edematous pancreatitis of the pancreas with associated cystic dystrophy of the proximal duodenal wall. No drainable peripancreatic fluid collections   2.  Small 2.2 cm hypoattenuating focus in the pancreatic tail, indeterminate for a small, organized peripancreatic fluid collection versus a pancreatic lesion. Recommend follow-up pancreatic protocol MRI in 4-6 weeks for further characterization after resolution of current episode of acute pancreatitis.  3.  Mild wall thickening of the distal esophagus suggesting esophagitis.   4.  Moderate hepatic steatosis.     7/10/23 CT:  1.  Worsening acute interstitial pancreatitis compared to 7/4/2023.  2.  New short segment thrombus in the superior mesenteric and proximal  portal vein secondary to #1.  3.  New small bilateral pleural effusions and bilateral lower lobe  opacities, atelectasis versus pneumonitis.        CONSULTATION ASSESSMENT AND PLAN:    Principal Problem:        Active Problems:    Alcohol-induced acute pancreatitis, unspecified complication status    Abnormal CT, pancreatic lesion    Assessment: Patient admitted 7/4/23 with pancreatitis and EtOH withdrawal with seizure day of admission.  Has been to treatment multiple times in the past for EtOH dependence.  Currently in the ICU due to complex management of EtOH withdrawal. CT on admission noted possible pancreatic fluid collection v. pancreatic lesion.  Repeat CT 7/10/23 with worsening of pancreatitis, new, acute SMV and PV thrombus.     Fever could be due to severe pancreatitis.  Also being treated for possible aspiration pneumonia. Receiving thiamine and  folic acid.     Thrombus  likely related to the acute pancreatitis and should improve with improvement of the pancreatitis.  No clear guidelines on anticoagulation, but would hold off for now as likely to improve on its own.     Patient cleared to try diet today by SLP.       Plan:  - monitor oral intake today  - if evidence of worsening abdominal pain, consider repeating CT in coming days which will also assess thrombus  - recommend no anticoagulation for now, thrombus should improve with improvement on pancreatitis  - tentative plan for outpatient EUS in about 6 weeks to rule out pancreatic tail lesion          Zita Gill  [facility]  Office:  [phone]    Approximately 15 minutes of total time was spent providing patient care, including patient evaluation, reviewing documentation/test results, and .

## 2023-07-11 NOTE — PROGRESS NOTES
[facility]    Medicine Progress Note - Hospitalist Service    Date of Admission:  7/4/2023    Assessment & Plan    Dariusz Ferreiar is a 42 year old male admitted on 7/4/2023. He presents with alcohol withdrawal complicated by withdrawal seizure. Patient with prolonged ICU stay with high dose Precedex required due to agitation. Eventually given phenobarbital taper and had slow improvement in mentation.     Addendum: cleared for regular diet. Will stop IVF as significant agitation when patient has to urinate, he is drinking fluids well per RN. He is having some increased agitation. Will schedule clonidine oral and start valproate to see if we can get him down on his precedex. Can uptitrate oral clonidine or switch to patch in coming days     Alcohol withdrawal with alcohol withdrawal seizure  Alcohol use disorder, severe  Possible benzodiazipine induced delirium  Delirium tremens  Longstanding hx alcohol use disorder. Has been to treatment 5+ times in the past. States binge drinks, has been drinking heavily last 2 weeks, stopped Saturday, withdrawal started Sunday and abdominal pain today. In ED tachy 100s, hypertensive, afebrile. WBC 16.5, hgb 19.1 (hemoconcentration). Etoh negative. Given phenobarbital 260 mg x 1 in ED. EKG ST.   Did suffer a generalized tonic clonic seizure the day of admission, and transferred to the ICU with initiation of precedex infusion.    - CIWA, will STOP further benzo use due to concern for benzo-induced delirium. Out of window for concern for further seizures. Also has ongoing phenobarbital taper.  - continue precedex, titrate off as able  - continue haldol, zyprexa, seroquel as needed for agitation  - vitamins including IV thiamine (wernickes panel)  - will need chemical dependence eval  - phenobarb taper started 7/8 due to ongoing severe withdrawal  - consult psychiatry    Acute hypoxemic respiratory failure, improving  Likely secondary to sedation, but we  will evaluate for signs of developing pneumonia. Overall SIRS criteria are improving  Plan  - aspiration precautions  - PRN lasix for wheeze or signs of fluid overload    Fever  Presumed aspiration PNA  * CXR 7/7 shows increased infiltrates after febrile on 7/6, 7/7, 7/9, 7/10  * CT head 7/10 w/o acute findings  * CT CAP 7/10 with worsening pancreastitis with associated clot, lung infiltrates.   * leg USG 7/10 without clot  * UA 7/10 non-infected  - started cefepime for presume aspiration or hospital acquired PNA, switched to levofloxacin 7/10 due to concern for cefepime worsening AMS  - continue levofloxacin  - MRSA nasal swab negative so vanco stopped.   - follow blood cultures    Acute toxic metabolic encephalopathy  Due to medication for withdrawal and likely underlying DTs  Plan  - wean sedatives as tolerates     Critical illness myopathy  - PT/OT    Dysphagia  * likely related to critical illness  - SLP     Interstitial pancreatitis, alcohol induced  Thrombus of superior mesenteric and proximal portal vein  Transaminitis/ elevate bilirubin  Pancreatic tail lesion  Alcoholic gastritis  With epigastric pain. Lipase 1083. AST/ /109. TB 1.5. acute LFT abnormalities 2/2 Etoh.   CT with acute interstitial pancreatitis  - May start diet as soon as mentation improved.   - IV fluids to remain   - pantoprazole 40 mg IV BID  - prn analgesics, antiemetics  - appreciate GI consultation   - no anticoagulation for clot  - if worsened abdominal pain would re-image abdomen   - plan outpatient EUS in 6 weeks to evaluate pancreatic tail lesion     Hypokalemia  Hypophosphatemia  - replace per protocol     Mood disorder  [needs rec- bupropion 300 mg daily, fluvoxamine 200 mg at HS, lamotrigine 300 mg daily]  - do NOT resume buproprion when able to take PO, would hold until able to stay sober due to decreased seizure threshold  - resume fluvoxamine when able to take PO  - will need to uptitrate lamotrigine when able to take  "PO     Hx methamphetamine use disorder  Has had MSSA abscesses 2/2 IV meth use in the past. Denies meth use for several years.           Diet: NPO for Medical/Clinical Reasons Except for: No Exceptions    DVT Prophylaxis: Enoxaparin (Lovenox) SQ and Pneumatic Compression Devices  Martin Catheter: Not present  Lines: PRESENT      PICC 07/09/23 Triple Lumen Right Basilic Ok for immediate use-Site Assessment: WDL      Cardiac Monitoring: ACTIVE order. Indication: ICU  Code Status: Full Code      Clinically Significant Risk Factors        # Hypokalemia: Lowest K = 3.2 mmol/L in last 2 days, will replace as needed       # Hypoalbuminemia: Lowest albumin = 2.7 g/dL at 7/6/2023  4:56 AM, will monitor as appropriate            # Overweight: Estimated body mass index is 27.38 kg/m  as calculated from the following:    Height as of this encounter: 1.702 m (5' 7\").    Weight as of this encounter: 79.3 kg (174 lb 13.2 oz).           Disposition Plan     Expected Discharge Date: 07/12/2023                   30 minutes of critical care, management of precedex, dosing phenobarb, management of severe etoh withdrawal, delirium.    Wilbur Gatica MD  Hospitalist Service  [facility]  Securely message with [employer] (more info)  Text page via "[employer]" Paging/Directory   ______________________________________________________________________    Interval History   Remains on precedex drip when seen on 7/11. RN to attempt to titrate off as able. Holding further benzos due to concern for benzo induced or prolonged delirium. Low concern for further seizures or EtOH withdrawal. Getting high dose thiamine. 7/10-7/11 has been more alert and awake but still having some waxing and waning mentation with clear hallucinations. PM of 7/10 was completely alert/awake/appropriate with just some issues swallowing. 7/11 in AM patient discussing he has been in [facility] for the last 3 weeks. He does note he's been in the hospital " "for at least the last 24 hours. He thinks that someone broke into his room overnight and extorted $24k. He thinks that the person who broke in was murdered on hospital property \"so that'll be a bigger problem for you than me I guess.\" This is clearly a hallucination. He states he is very eager to discharge.    Was febrile overnight again. Unclear exactly why, but suspect related to pancreatitis. UA negative, blood cultures negative as yet, CT CAP with possible some infiltrate in lungs, but has been on abx since 7/6.    I discussed with  Zeyad outside room about delirium, time to recovery, need for therapy evals.     I did write and fax a letter to Ashish's work stating the was hospitalized. There was a form that work sent that was asking for medically protected information. I am uncertain of what information his work is legally entitled to, so I provided dates but not other protected information. Copy of letter was provided to .     Discussed with RN, sit, ICU MD, ICU pharmD.     Physical Exam   Vital Signs: Temp: 98.5  F (36.9  C) Temp src: Oral BP: 125/77 Pulse: 101   Resp: 20 SpO2: 98 % O2 Device: Nasal cannula Oxygen Delivery: 2 LPM  Weight: 174 lbs 13.2 oz    General Appearance:  somnolent, awakens to touch. moves all extremities  Respiratory: crackles in base. Wheeze.  Cardiovascular: regular  no murmur. No edema  GI: distended, tender to light touch. No rebound or guarding  Skin: no rashes or lesions grossly    Other:  CN grossly intact, RAMIREZ       Medical Decision Making       30 MINUTES SPENT BY ME on the date of service doing chart review, history, exam, documentation & further activities per the note.    30 minutes of critical care, management of precedex, dosing IV benzos, dosing IV valproate, phenobarb taper, management of acutely ill ICU patient, management of severe etoh withdrawal.    Data     I have personally reviewed the following data over the past 24 hrs:    14.8 (H)  \   10.8 (L)   / " 416     130 (L) 96 (L) 2.9 (L) /  63 (L)   4.5 26 0.63 (L) \       ALT: 23 AST: 27 AP: 71 TBILI: 0.4   ALB: 2.8 (L) TOT PROTEIN: 6.0 (L) LIPASE: N/A       Imaging results reviewed over the past 24 hrs:   Recent Results (from the past 24 hour(s))   US Lower Extremity Venous Duplex Bilateral    Narrative    VENOUS ULTRASOUND BILATERAL LOWER EXTREMITIES July 10, 2023 10:09 AM     HISTORY: Patient with fever, request made for evaluation of deep vein  thrombosis.    COMPARISON: None.    TECHNIQUE: Color Doppler and spectral waveform analysis performed  throughout the deep veins of both lower extremities.    FINDINGS: Both common femoral, proximal greater saphenous, femoral,  and popliteal veins demonstrate normal blood flow, compression, and  augmentation. Posterior tibial and peroneal veins are compressible.      Impression    IMPRESSION: Negative for deep venous thrombosis throughout both lower  extremities.    TORREY MCKEON MD         SYSTEM ID:  [id number]   CT Head w/o Contrast    Narrative    CT SCAN OF THE HEAD WITHOUT CONTRAST   7/10/2023 11:34 AM     HISTORY: Ongoing agitation and confusion.    TECHNIQUE:  Axial images of the head and coronal reformations without  IV contrast material. Radiation dose for this scan was reduced using  automated exposure control, adjustment of the mA and/or kV according  to patient size, or iterative reconstruction technique.    COMPARISON: None.    FINDINGS: No evidence of hemorrhage, mass, or hydrocephalus.  Parenchyma within normal limits for age. No acute osseous abnormality.  Air-fluid level within the sphenoid sinus.      Impression    IMPRESSION: No acute intracranial abnormality.         LEONA GARNETT MD         SYSTEM ID:  [id number]   CT Chest/Abdomen/Pelvis w Contrast    Narrative    CT CHEST/ABDOMEN/PELVIS WITH CONTRAST July 10, 2023 11:35 AM    CLINICAL HISTORY: Recurrent fevers, likely aspiration, known  pneumonia, diffuse abdominal pain.    TECHNIQUE: CT scan of the  chest, abdomen, and pelvis was performed  following injection of IV contrast. Multiplanar reformats were  obtained. Dose reduction techniques were used.   CONTRAST:  86 mL Isovue 370.    COMPARISON: 7/4/2023.    FINDINGS:   LUNGS AND PLEURA: Small bilateral pleural effusions are new. Adjacent  opacity in both lower lobes may represent atelectasis although  superimposed pneumonitis possible.    MEDIASTINUM/AXILLAE: No lymphadenopathy. Right PICC line tip is in the  SVC. No coronary artery calcification.    HEPATOBILIARY: No bile duct dilation.    PANCREAS: Moderate to severe acute interstitial pancreatitis again  seen. The pancreas enhances normally. Increased inflammation and  ill-defined acute peripancreatic fluid collections in the  peripancreatic space and extending into the anterior pararenal spaces  bilaterally.    SPLEEN: Normal.    ADRENAL GLANDS: Normal.    KIDNEYS/BLADDER: Normal.    BOWEL: Normal.    PELVIC ORGANS: Normal.    ADDITIONAL FINDINGS: New short segment nonocclusive thrombus in the  superior mesenteric vein and proximal main portal vein. The splenic  vein is attenuated but patent.    MUSCULOSKELETAL: Normal.      Impression    IMPRESSION:  1.  Worsening acute interstitial pancreatitis compared to 7/4/2023.  2.  New short segment thrombus in the superior mesenteric and proximal  portal vein secondary to #1.  3.  New small bilateral pleural effusions and bilateral lower lobe  opacities, atelectasis versus pneumonitis.    TIARA LIU MD         SYSTEM ID:  [id number]

## 2023-07-11 NOTE — PLAN OF CARE
Neuro: Oriented to self, confused, hallucinating at times. CIWA score high but benzos d/c'd, prn haldol given, dex titrated. Sitter for safety, pulling at lines, impulsive  CV: tele SR/ST  Resp: LS clear except diminished in RLL, on 2L NC  GI: Passing gas, no BM this shift, NPO pending speech eval   : Able to use urinal  Skin: edema to hands, otherwise intact  Activity: 2 + lift  Additional: Na 130, MIVF changed. Phos replaced per protocol, recheck around 1400.

## 2023-07-11 NOTE — PROVIDER NOTIFICATION
"Patient agitated attempting to get out of bed and occasionally making swinging at staff Provider paged \"Patient agitated continues to try to get out of bed and occasionally strikes at staff. Precedex maxed at 1.2 zyprexa and haldol given and not available.\"  Awaiting further orders.  Provider saw patient bedside new orders added.  "

## 2023-07-11 NOTE — PLAN OF CARE
Neuro: orientation fluctuated - intermittently oriented x4 other times only to self and paranoid, PERRL, opens eyes to voice, follows commands, moves all extremities, max CIWA 14 - PRN valium given x2, 1x dose versed given for CT scans     CV: tele SR/ST     Resp: LS clear except diminished in RLL, on 2L NC     GI: BS hypoactive, no BM this shift, NPO pending speech eval tmrw      : primofit in place with adequate urine output      Skin: edema to hands, otherwise intact     Activity: 2 + lift     Additional: PRN dilaudid x2 for pain, Tmax 103.1 F - PRN tylenol given x2, phos recheck WDL,  updated at bedside

## 2023-07-11 NOTE — PROGRESS NOTES
SLP Bedside Swallow Evaluation  07/11/23 0905   Appointment Info   Signing Clinician's Name / Credentials (SLP) Nikia Paul MA [facility] SLP   General Information   Onset of Illness/Injury or Date of Surgery 07/04/23   Referring Physician Dr. Gatica   Patient/Family Therapy Goal Statement (SLP) To eat and drink today   Pertinent History of Current Problem Per notes: Dariusz Ferreira is a 42 year old male admitted on 7/4/2023. He presents with alcohol withdrawal complicated by withdrawal seizure. Patient with prolonged ICU stay with high dose Precedex required due to agitation. Eventually given phenobarbital taper and had slow improvement in mentation.   Alcohol withdrawal with alcohol withdrawal seizure  Alcohol use disorder, severe  Possible benzodiazipine induced delirium  Delirium tremens  Longstanding hx alcohol use disorder. Has been to treatment 5+ times in the past. States binge drinks, has been drinking heavily last 2 weeks, stopped Saturday, withdrawal started Sunday and abdominal pain today. In ED tachy 100s, hypertensive, afebrile. WBC 16.5, hgb 19.1 (hemoconcentration). Etoh negative. Given phenobarbital 260 mg x 1 in ED. EKG ST.   Did suffer a generalized tonic clonic seizure the day of admission, and transferred to the ICU with initiation of precedex infusion.of precedex infusion.  Acute hypoxemic respiratory failure, improving  Likely secondary to sedation, but we will evaluate for signs of developing pneumonia. Overall SIRS criteria are improving  Plan  - aspiration precautions  - PRN lasix for wheeze or signs of fluid overload     Fever  Presumed aspiration PNA  * CXR 7/7 shows increased infiltrates after febrile on 7/6, 7/7, 7/9, 7/10  * CT head 7/10 w/o acute findings  * CT CAP 7/10 with worsening pancreastitis with associated clot, lung infiltrates.   * leg USG 7/10 without clot  * UA 7/10 non-infected  - started cefepime for presume aspiration or hospital acquired PNA, switched to  levofloxacin 7/10 due to concern for cefepime worsening AMS  - continue levofloxacin   General Observations Pt was awake and able to follow commands.  Ataxic dysarthria and confusion noted in conversation.  RN reports pt coughed during the nursing swallow screen 7/10.  No coughing reported today per pt/family.   Type of Evaluation   Type of Evaluation Swallow Evaluation   Oral Motor   Oral Musculature generally intact  (? Slight tongue tip pull to R x 1; decreased coordination at times)   Dentition (Oral Motor)   Dentition (Oral Motor) adequate dentition   Vocal Quality/Secretion Management (Oral Motor)   Comment, Vocal Quality/Secretion Management (Oral Motor) WFL   General Swallowing Observations   Past History of Dysphagia No hx of dysphagia   Respiratory Support (General Swallowing Observations) none   Current Diet/Method of Nutritional Intake (General Swallowing Observations, NIS) NPO   Swallowing Evaluation Clinical swallow evaluation   Clinical Swallow Evaluation   Feeding Assistance frequent cues/help required   Clinical Swallow Evaluation Textures Trialed thin liquids;solid foods   Clinical Swallow Eval: Thin Liquid Texture Trial   Mode of Presentation, Thin Liquids cup   Volume of Liquid or Food Presented sips x 5+   Oral Phase of Swallow premature pharyngeal entry   Pharyngeal Phase of Swallow   (delay)   Diagnostic Statement impulsive large sips, throat clearing intermittently after large sips   Clinical Swallow Evaluation: Solid Food Texture Trial   Mode of Presentation self-fed   Volume Presented bites x 3   Oral Phase residue in oral cavity   Pharyngeal Phase   (delay)   Diagnostic Statement alternating used to clear oral cavity, no aspiration signs   Esophageal Phase of Swallow   Patient reports or presents with symptoms of esophageal dysphagia No   Swallowing Recommendations   Diet Consistency Recommendations regular diet;thin liquids (level 0)   Supervision Level for Intake 1:1 supervision needed    Mode of Delivery Recommendations no straws;bolus size, small;slow rate of intake  (small sips by cup with assist)   Swallowing Maneuver Recommendations alternate food and liquid intake   Monitoring/Assistance Required (Eating/Swallowing) monitor for cough or change in vocal quality with intake;check mouth frequently for oral residue/pocketing   Recommended Feeding/Eating Techniques (Swallow Eval) maintain upright sitting position for eating;minimize distractions during oral intake;provide assist with feeding   Medication Administration Recommendations, Swallowing (SLP) Meds with puree   Comment, Swallowing Recommendations Hold po if alertness is poor and/or increased coughing observed   Clinical Impression   Criteria for Skilled Therapeutic Interventions Met (SLP Eval) Yes, treatment indicated   SLP Diagnosis Mild oral-pharyngeal dysphagia   Risks & Benefits of therapy have been explained evaluation/treatment results reviewed;care plan/treatment goals reviewed;risks/benefits reviewed;current/potential barriers reviewed;participants voiced agreement with care plan;participants included;patient;spouse/significant other   Clinical Impression Comments Mild oral-pharyngeal dysphagia was observed during today's SLP swallow evaluation.  Risk factors/deficits include confusion, mild ataxia, delayed swallows, cough reported with thin liquids with ice 7/10, and intermittent throat clearing with thin liquids when taking large impulsive sips.  Recommend 1:1 assist/supervision and cues to use safe swallow strategies with a regular diet and thin liquids.  Hold po if alertness is poor and/or increased coughing observed.  Plan to provide short term SLP swallow Tx to assess diet tolerance and train strategies as needed.   SLP Total Evaluation Time   Eval: oral/pharyngeal swallow function, clinical swallow Minutes (40503) 15   SLP Goals   Therapy Frequency (SLP Eval) 4 times/wk   SLP Predicted Duration/Target Date for Goal  Attainment 07/17/23   SLP Goals Swallow   SLP: Safely tolerate diet without signs/symptoms of aspiration Regular diet;Thin liquids;With use of swallow precautions;With use of compensatory swallow strategies;With assistance/supervision   Interventions   Interventions Quick Adds Swallowing Dysfunction   Swallowing Dysfunction &/or Oral Function for Feeding   Treatment of Swallowing Dysfunction &/or Oral Function for Feeding Minutes (50711) 10   Symptoms Noted During/After Treatment None   Treatment Detail/Skilled Intervention Swallow: Educated pt, RN, and family regarding current deficits and recommended regular diet with assist and use of strategies.  All verbalized understanding, pt will need continued education.  Pt was given max assist to take small sips of thin liquids by cup and to alternate solids and liquids.  Oral cavity was clear when alternating to thin liquids.   SLP Discharge Planning   SLP Plan SLP - assess diet tolerance, train strategies   SLP Discharge Recommendation home with assist   SLP Rationale for DC Rec Anticipate pt will meet his swallow Tx goal as an IP   SLP Brief overview of current status  Mild dysphagia; Rec: 1:1 assist/supervision and cues to use safe swallow strategies with a regular diet and thin liquids.  Hold po if alertness is poor and/or increased coughing observed.   Total Session Time   Total Session Time (sum of timed and untimed services) 25

## 2023-07-11 NOTE — PLAN OF CARE
ICU End of Shift Nursing Summary *For vital signs and complete assessments, please see documentation flowsheets      Neuro:  Patient confused, agitated and restless, A&Ox4 at this and A&O only to self when agitated and has rambling illogical speech patient does have visual hallucinations and does interact with them, patient is maxed on precedex and received Prn zyprexa and haldol with short period of rest before return to agitated state, provider was paged clonidine and valproate added. Patient can get combative and attempts to get out of bed during agitated period, patient has sitter. Strength equal bilaterally, purposeful motor movements, PERRLA.  Pain: Patient denied pain this shift  CV:  Sinus tach to NSR, BP WNL, denies chest pain  Pulm: LS dim RLL otherwise clear, on RA, spot checked SPO2 due to agitation  GI/: Multiple BMS this shift 2 loose, incontinent, occasionally incontinent of urine otherwise uses urinal, passed spech today regular diet with thin liquids no straw, sit upright and eat slowly with 1:1 supervision.  Endocrine: Q4h BG checks no insulin orders  Skin: Scattered bruising, red spots on back   Lines: R PICC infusing  Drips: Precedex gtt 1.2  Additional: Phosphorus replaced x2, spouse Zeyad said patient can call prior to 10pm if it will help with agitation, patient does often ask to call and then repeatedly calls    Plan (Upcoming Events): Continue to attempt to wean precedex    Bedside Shift Report Completed : yes  Bedside Safety Check Completed: yes

## 2023-07-11 NOTE — SIGNIFICANT EVENT
Significant Event Note    Time of event: 5:41 AM July 11, 2023    Description of event:  Paged for Na drop to 130    Plan:  IVF switched to D5 NS with 40 meq KCl (from 1/2 NS)    Discussed with: bedside nurse    Mango Delgado MD

## 2023-07-12 ENCOUNTER — APPOINTMENT (OUTPATIENT)
Dept: PHYSICAL THERAPY | Facility: CLINIC | Age: 43
DRG: 896 | End: 2023-07-12
Attending: STUDENT IN AN ORGANIZED HEALTH CARE EDUCATION/TRAINING PROGRAM
Payer: COMMERCIAL

## 2023-07-12 ENCOUNTER — APPOINTMENT (OUTPATIENT)
Dept: OCCUPATIONAL THERAPY | Facility: CLINIC | Age: 43
DRG: 896 | End: 2023-07-12
Attending: STUDENT IN AN ORGANIZED HEALTH CARE EDUCATION/TRAINING PROGRAM
Payer: COMMERCIAL

## 2023-07-12 LAB
ANION GAP SERPL CALCULATED.3IONS-SCNC: 14 MMOL/L (ref 7–15)
BUN SERPL-MCNC: 3.6 MG/DL (ref 6–20)
CALCIUM SERPL-MCNC: 8.3 MG/DL (ref 8.6–10)
CHLORIDE SERPL-SCNC: 105 MMOL/L (ref 98–107)
CREAT SERPL-MCNC: 0.64 MG/DL (ref 0.67–1.17)
DEPRECATED HCO3 PLAS-SCNC: 22 MMOL/L (ref 22–29)
GFR SERPL CREATININE-BSD FRML MDRD: >90 ML/MIN/1.73M2
GLUCOSE SERPL-MCNC: 101 MG/DL (ref 70–99)
MAGNESIUM SERPL-MCNC: 1.9 MG/DL (ref 1.7–2.3)
POTASSIUM SERPL-SCNC: 3.2 MMOL/L (ref 3.4–5.3)
POTASSIUM SERPL-SCNC: 3.6 MMOL/L (ref 3.4–5.3)
POTASSIUM SERPL-SCNC: 4 MMOL/L (ref 3.4–5.3)
SODIUM SERPL-SCNC: 141 MMOL/L (ref 136–145)

## 2023-07-12 PROCEDURE — 99232 SBSQ HOSP IP/OBS MODERATE 35: CPT | Performed by: HOSPITALIST

## 2023-07-12 PROCEDURE — 250N000011 HC RX IP 250 OP 636: Mod: JZ | Performed by: HOSPITALIST

## 2023-07-12 PROCEDURE — 82310 ASSAY OF CALCIUM: CPT | Performed by: HOSPITALIST

## 2023-07-12 PROCEDURE — 83735 ASSAY OF MAGNESIUM: CPT | Performed by: INTERNAL MEDICINE

## 2023-07-12 PROCEDURE — 97535 SELF CARE MNGMENT TRAINING: CPT | Mod: GO

## 2023-07-12 PROCEDURE — 250N000013 HC RX MED GY IP 250 OP 250 PS 637: Performed by: STUDENT IN AN ORGANIZED HEALTH CARE EDUCATION/TRAINING PROGRAM

## 2023-07-12 PROCEDURE — 97161 PT EVAL LOW COMPLEX 20 MIN: CPT | Mod: GP | Performed by: PHYSICAL THERAPIST

## 2023-07-12 PROCEDURE — 250N000009 HC RX 250: Performed by: HOSPITALIST

## 2023-07-12 PROCEDURE — 97116 GAIT TRAINING THERAPY: CPT | Mod: GP | Performed by: PHYSICAL THERAPIST

## 2023-07-12 PROCEDURE — 97530 THERAPEUTIC ACTIVITIES: CPT | Mod: GO

## 2023-07-12 PROCEDURE — 250N000011 HC RX IP 250 OP 636: Performed by: STUDENT IN AN ORGANIZED HEALTH CARE EDUCATION/TRAINING PROGRAM

## 2023-07-12 PROCEDURE — 200N000001 HC R&B ICU

## 2023-07-12 PROCEDURE — 250N000011 HC RX IP 250 OP 636: Mod: JZ | Performed by: INTERNAL MEDICINE

## 2023-07-12 PROCEDURE — 258N000003 HC RX IP 258 OP 636: Performed by: STUDENT IN AN ORGANIZED HEALTH CARE EDUCATION/TRAINING PROGRAM

## 2023-07-12 PROCEDURE — 250N000009 HC RX 250: Performed by: STUDENT IN AN ORGANIZED HEALTH CARE EDUCATION/TRAINING PROGRAM

## 2023-07-12 PROCEDURE — 97166 OT EVAL MOD COMPLEX 45 MIN: CPT | Mod: GO

## 2023-07-12 PROCEDURE — 80048 BASIC METABOLIC PNL TOTAL CA: CPT | Performed by: INTERNAL MEDICINE

## 2023-07-12 PROCEDURE — C9113 INJ PANTOPRAZOLE SODIUM, VIA: HCPCS | Mod: JZ | Performed by: INTERNAL MEDICINE

## 2023-07-12 PROCEDURE — 80048 BASIC METABOLIC PNL TOTAL CA: CPT | Performed by: HOSPITALIST

## 2023-07-12 PROCEDURE — 97530 THERAPEUTIC ACTIVITIES: CPT | Mod: GP | Performed by: PHYSICAL THERAPIST

## 2023-07-12 RX ORDER — POTASSIUM CHLORIDE 29.8 MG/ML
20 INJECTION INTRAVENOUS
Status: COMPLETED | OUTPATIENT
Start: 2023-07-12 | End: 2023-07-12

## 2023-07-12 RX ADMIN — DEXMEDETOMIDINE HYDROCHLORIDE 0.3 MCG/KG/HR: 400 INJECTION INTRAVENOUS at 17:12

## 2023-07-12 RX ADMIN — THIAMINE HYDROCHLORIDE 250 MG: 100 INJECTION, SOLUTION INTRAMUSCULAR; INTRAVENOUS at 10:34

## 2023-07-12 RX ADMIN — DEXMEDETOMIDINE HYDROCHLORIDE 0.6 MCG/KG/HR: 400 INJECTION INTRAVENOUS at 06:50

## 2023-07-12 RX ADMIN — PHENOBARBITAL SODIUM 32.4 MG: 65 INJECTION INTRAMUSCULAR; INTRAVENOUS at 23:04

## 2023-07-12 RX ADMIN — QUETIAPINE FUMARATE 25 MG: 25 TABLET ORAL at 08:39

## 2023-07-12 RX ADMIN — LEVOFLOXACIN 500 MG: 500 INJECTION, SOLUTION INTRAVENOUS at 12:07

## 2023-07-12 RX ADMIN — PANTOPRAZOLE SODIUM 40 MG: 40 INJECTION, POWDER, FOR SOLUTION INTRAVENOUS at 08:39

## 2023-07-12 RX ADMIN — ENOXAPARIN SODIUM 40 MG: 40 INJECTION SUBCUTANEOUS at 15:23

## 2023-07-12 RX ADMIN — POTASSIUM CHLORIDE 20 MEQ: 29.8 INJECTION, SOLUTION INTRAVENOUS at 05:43

## 2023-07-12 RX ADMIN — POTASSIUM CHLORIDE 20 MEQ: 29.8 INJECTION, SOLUTION INTRAVENOUS at 06:50

## 2023-07-12 RX ADMIN — DEXMEDETOMIDINE HYDROCHLORIDE 1 MCG/KG/HR: 400 INJECTION INTRAVENOUS at 01:21

## 2023-07-12 RX ADMIN — FOLIC ACID 1 MG: 5 INJECTION, SOLUTION INTRAMUSCULAR; INTRAVENOUS; SUBCUTANEOUS at 10:34

## 2023-07-12 RX ADMIN — PANTOPRAZOLE SODIUM 40 MG: 40 INJECTION, POWDER, FOR SOLUTION INTRAVENOUS at 19:38

## 2023-07-12 RX ADMIN — VALPROATE SODIUM 500 MG: 100 INJECTION, SOLUTION INTRAVENOUS at 01:56

## 2023-07-12 RX ADMIN — PHENOBARBITAL SODIUM 32.4 MG: 65 INJECTION INTRAMUSCULAR; INTRAVENOUS at 10:35

## 2023-07-12 RX ADMIN — VALPROATE SODIUM 500 MG: 100 INJECTION, SOLUTION INTRAVENOUS at 17:38

## 2023-07-12 RX ADMIN — VALPROATE SODIUM 500 MG: 100 INJECTION, SOLUTION INTRAVENOUS at 12:07

## 2023-07-12 ASSESSMENT — ACTIVITIES OF DAILY LIVING (ADL)
WALKING_OR_CLIMBING_STAIRS_DIFFICULTY: NO
DIFFICULTY_EATING/SWALLOWING: NO
CONCENTRATING,_REMEMBERING_OR_MAKING_DECISIONS_DIFFICULTY: NO
ADLS_ACUITY_SCORE: 47
NUMBER_OF_TIMES_PATIENT_HAS_FALLEN_WITHIN_LAST_SIX_MONTHS: 1
DRESSING/BATHING_DIFFICULTY: NO
ADLS_ACUITY_SCORE: 47
ADLS_ACUITY_SCORE: 30
DIFFICULTY_COMMUNICATING: NO
ADLS_ACUITY_SCORE: 49
DOING_ERRANDS_INDEPENDENTLY_DIFFICULTY: NO
CHANGE_IN_FUNCTIONAL_STATUS_SINCE_ONSET_OF_CURRENT_ILLNESS/INJURY: NO
ADLS_ACUITY_SCORE: 47
HEARING_DIFFICULTY_OR_DEAF: NO
TOILETING_ISSUES: NO
DEPENDENT_IADLS:: INDEPENDENT
WEAR_GLASSES_OR_BLIND: NO
ADLS_ACUITY_SCORE: 47
FALL_HISTORY_WITHIN_LAST_SIX_MONTHS: YES

## 2023-07-12 NOTE — PROGRESS NOTES
07/12/23 1430   Appointment Info   Signing Clinician's Name / Credentials (OT) Leighamor Gross, OTR/L   Living Environment   People in Home spouse   Current Living Arrangements house   Home Accessibility stairs to enter home;stairs within home   Number of Stairs, Within Home, Primary greater than 10 stairs  (full flight to upstairs but pt reports he doesn't use often)   Stair Railings, Within Home, Primary railings safe and in good condition   Transportation Anticipated family or friend will provide   Living Environment Comments Pt lives in home w/ spouse. He has stairs up to 2nd floor but reports he does not use that floor often. Unsure of accuracy of report.   Self-Care   Usual Activity Tolerance good   Current Activity Tolerance fair   Regular Exercise No   Equipment Currently Used at Home none   Fall history within last six months yes   Number of times patient has fallen within last six months 1   Activity/Exercise/Self-Care Comment Pt typically independent w/ ADL tasks and mobility w/out AD   Instrumental Activities of Daily Living (IADL)   IADL Comments Pt works as an , drives. Shares home mgmt w/ spouse.   General Information   Onset of Illness/Injury or Date of Surgery 07/04/23   Referring Physician Wilbur Gatica MD   Patient/Family Therapy Goal Statement (OT) go to rehab   Additional Occupational Profile Info/Pertinent History of Current Problem Dariusz Ferreira is a 42 year old male admitted on 7/4/2023. He presents with alcohol withdrawal complicated by withdrawal seizure. Patient with prolonged ICU stay with high dose Precedex required due to agitation. Eventually given phenobarbital taper and had slow improvement in mentation.   Limitations/Impairments safety/cognitive   Cognitive Status Examination   Orientation Status person   Affect/Mental Status (Cognitive) confused;emotionally labile   Follows Commands follows one-step commands;75-90% accuracy;delayed  response/completion;increased processing time needed;repetition of directions required;verbal cues/prompting required   Safety Deficit moderate deficit;ability to follow commands;at risk behavior observed;awareness of need for assistance;impulsivity;judgment;insight into deficits/self-awareness;problem-solving;safety precautions awareness   Memory Deficit moderate deficit;immediate recall;recall, recent events   Attention Deficit moderate deficit;requires cues/redirection to task;perseverates on recent task   Executive Function Deficit moderate deficit   Cognitive Status Comments Pt oriented to self only, confused and needing frequent cues for safety. Dec insight into deficits and very tangential w/ conversation. Reoriented frequently during session. Will need further monitoring/screening.   Visual Perception   Visual Impairment/Limitations WFL   Pain Assessment   Patient Currently in Pain No   Range of Motion Comprehensive   Comment, General Range of Motion BUE WFL   Strength Comprehensive (MMT)   Comment, General Manual Muscle Testing (MMT) Assessment generalized weakness in BUE   Coordination   Fine Motor Coordination impaired   Bed Mobility   Bed Mobility supine-sit   Supine-Sit [place] (Bed Mobility) minimum assist (75% patient effort)   Transfers   Transfers sit-stand transfer   Sit-Stand Transfer   Sit-Stand [place] (Transfers) minimum assist (75% patient effort);contact guard;2 person assist;verbal cues   Assistive Device (Sit-Stand Transfers)   (SS and HH assist)   Balance   Balance Comments Min A while ambulating in room w/ HH assist. Pt unsteady - defer to PT note   Activities of Daily Living   BADL Assessment/Intervention bathing;upper body dressing;lower body dressing;grooming;toileting   Bathing Assessment/Intervention   [place] Level (Bathing) moderate assist (50% patient effort);verbal cues   Comment, (Bathing) per clinical judgement   Upper Body Dressing Assessment/Training   Comment,  (Upper Body Dressing) per clinical judgement   [place] Level (Upper Body Dressing) minimum assist (75% patient effort);verbal cues   Lower Body Dressing Assessment/Training   [place] Level (Lower Body Dressing) maximum assist (25% patient effort)   Grooming Assessment/Training   [place] Level (Grooming) minimum assist (75% patient effort)   Toileting   Comment, (Toileting) per clinical judgement   [place] Level (Toileting) minimum assist (75% patient effort);verbal cues   Clinical Impression   Criteria for Skilled Therapeutic Interventions Met (OT) Yes, treatment indicated   OT Diagnosis decreased I/ADL independence   OT Problem List-Impairments impacting ADL problems related to;activity tolerance impaired;balance;cognition;communication;coordination;mobility;strength   Assessment of Occupational Performance 3-5 Performance Deficits   Identified Performance Deficits decreased independence w/ dressing, bathing, functional mobility, toileting, home mgmt   Planned Therapy Interventions (OT) ADL retraining;IADL retraining;cognition;fine motor coordination training;ROM;strengthening;transfer training;home program guidelines;progressive activity/exercise;risk factor education   Clinical Decision Making Complexity (OT) moderate complexity   Anticipated Equipment Needs Upon Discharge (OT)   (TBD)   Risk & Benefits of therapy have been explained evaluation/treatment results reviewed;risks/benefits reviewed;care plan/treatment goals reviewed;current/potential barriers reviewed;patient;participants included;participants voiced agreement with care plan   OT Total Evaluation Time   OT Eval, Moderate Complexity Minutes (57034) 10   OT Goals   Therapy Frequency (OT) 5 times/wk   OT Predicted Duration/Target Date for Goal Attainment 07/19/23   OT Goals Hygiene/Grooming;Upper Body Dressing;Lower Body Dressing;Cognition;OT Goal 1;Toilet Transfer/Toileting   OT: Hygiene/Grooming modified independent;while standing    OT: Upper Body Dressing Modified independent;including set-up/clothing retrieval   OT: Lower Body Dressing Modified independent;including set-up/clothing retrieval   OT: Toilet Transfer/Toileting Modified independent;toilet transfer;cleaning and garment management   OT: Cognitive Patient/caregiver will verbalize understanding of cognitive assessment results/recommendations as needed for safe discharge planning   OT: Goal 1 Pt will follow >90% of commands accurately w/out need for VC in preparation for return home.   Interventions   Interventions Quick Adds Self-Care/Home Management;Therapeutic Activity   Self-Care/Home Management   Self-Care/Home Mgmt/ADL, Compensatory, Meal Prep Minutes (40514) 10   Symptoms Noted During/After Treatment (Meal Preparation/Planning Training) fatigue   Treatment Detail/Skilled Intervention Pt in bed upon arrival, agreeable to OT/PT. Co-tx d/t confusion, complex needs. Pt oriented to self only, speech tangential and hard to follow at times. Pt needing inc cues for safety, very impulsive but does better as session progresses. Min A supine<>sit. Pt stood at sink for ~5min to brush teeth and wash face w/ Min A. Dec FMC noted during tasks. Returned to chair and pt combed hair w/ SBA while seated. Noted small amount of blood on scalp after pt combed hair - RN updated.   Therapeutic Activities   Therapeutic Activity Minutes (36428) 10   Treatment Detail/Skilled Intervention Pt initially trying to stand from EOB before writers ready, needing tactile cues for safety. Placed SS and pt stood w/ Scott. Stood ~3 min in SS, engaged in reaching and marching in place. Pt also completed 3 sit<>stands w/in SS w/ CGA, progressing to not using SS bar. HR up to 150s w/ activity, 130s resting. Returned to EOB, removed SS and pt stood x2 from EOB w/ CGA, HH assist. PT engaged pt in some balance ex at EOB, then he walked in room w/ Min A HH assist. VC for safety. Ended session in chair w/ all needs met,  sitter in room and RN updated.   OT Discharge Planning   OT Plan d/c co-treat. dressing, cognition (screen when pt clears more), standing balance for ADL tasks   OT Discharge Recommendation (DC Rec) Transitional Care Facility   OT Rationale for DC Rec Pt well below baseline, typically independent and now needing assist for all mobility and ADL tasks. Pt limited by dec cognition, balance deficits, impaired activity tolerance and weakness. Recommend TCU for continued IP OT to progress I/ADL independence prior to returning home.   OT Brief overview of current status Min Ax2, HH assist in room, oriented to self only, Min A standing g/h   Total Session Time   Timed Code Treatment Minutes 20   Total Session Time (sum of timed and untimed services) 30

## 2023-07-12 NOTE — CONSULTS
Care Management Initial Consult    General Information  Assessment completed with: Dariusz Resendez  Type of CM/SW Visit: Initial Assessment    Primary Care Provider verified and updated as needed: Yes   Readmission within the last 30 days: no previous admission in last 30 days      Reason for Consult: discharge planning  Advance Care Planning: Advance Care Planning Reviewed: no concerns identified          Communication Assessment  Patient's communication style: spoken language (English or Bilingual)    Hearing Difficulty or Deaf: no        Cognitive  Cognitive/Neuro/Behavioral: .WDL except  Level of Consciousness: alert  Arousal Level: arouses to touch/gentle shaking  Orientation: other (see comments)  Mood/Behavior: restless  Best Language: 0 - No aphasia  Speech: SHIV (unable to assess)    Living Environment:   People in home: spouse  Zeyad  Current living Arrangements: house      Able to return to prior arrangements: yes       Family/Social Support:  Care provided by: self, spouse/significant other  Provides care for: no one  Marital Status:     Zeyad       Description of Support System: Supportive, Involved    Support Assessment: Patient communicates needs well met    Current Resources:   Patient receiving home care services: No     Community Resources: [facility]  Equipment currently used at home:    Supplies currently used at home: None    Employment/Financial:  Employment Status: employed full-time        Financial Concerns: No concerns identified   Referral to Financial Worker: No       Does the patient's insurance plan have a 3 day qualifying hospital stay waiver?  Yes   Will the waiver be used for post-acute placement? No    Lifestyle & Psychosocial Needs:  Social Determinants of Health     Tobacco Use: Medium Risk (7/10/2023)    Patient History      Smoking Tobacco Use: Former      Smokeless Tobacco Use: Never      Passive Exposure: Not on file   Alcohol Use: Not on file    Financial Resource Strain: Not on file   Food Insecurity: Not on file   Transportation Needs: Not on file   Physical Activity: Not on file   Stress: Not on file   Social Connections: Not on file   Intimate Partner Violence: Not on file   Depression: Not at risk (8/3/2022)    PHQ-2      PHQ-2 Score: 0   Housing Stability: Not on file       Functional Status:  Prior to admission patient needed assistance:   Dependent ADLs:: Independent  Dependent IADLs:: Independent  Assesssment of Functional Status: Not at  functional baseline    Mental Health Status:  Mental Health Status: Current Concern       Chemical Dependency Status:  Chemical Dependency Status: Current Concern             Values/Beliefs:  Spiritual, Cultural Beliefs, Anglican Practices, Values that affect care: no               Additional Information:  Writer met with Pt in room and introduced self and role in discharge planning. Pt lives in his house with his spouse-Zeyad. Pt is taking care of all his own needs, and is still working and driving. Pt denies any services or use of devices. The Pt is interested in OP treatment programs like the [facility], but would be available to go in the evening. Pt states that his boss has been sober for over 20 years and is his unofficial sponsor. Pt was tearful when talking to writer and asked if the Pt would benefit from a Spiritual health consult and the Pt stated that it would be helpful. Writer put in a spiritual health consult. Pt's spouse would  at discharge.         Care Coordinator will continue to follow for discharge needs.         Carolina Carlson, RN, BSN, Care Coordinator

## 2023-07-12 NOTE — PROGRESS NOTES
Pt had the best day he's had since admission. Calm and cooperative a majority of the time. Up to chair x2 w/ strong assist of 2. Worked with rehab. Eating well. Dex weaned to 0.1. Still hallucinating at times but easily redirectable.

## 2023-07-12 NOTE — PROGRESS NOTES
"[facility] GASTROENTEROLOGY PROGRESS NOTE    SUBJECTIVE:  Seems to be tolerating regular diet. Denies abdominal pain. Out of restraints today.    OBJECTIVE:    /81   Pulse 98   Temp 98.2  F (36.8  C) (Axillary)   Resp 27   Ht 1.702 m (5' 7\")   Wt 79.8 kg (175 lb 14.8 oz)   SpO2 96%   BMI 27.55 kg/m    Temp (24hrs), Av.7  F (37.6  C), Min:97.9  F (36.6  C), Max:103.1  F (39.5  C)    Patient Vitals for the past 72 hrs:   Weight   23 0600 79.8 kg (175 lb 14.8 oz)   23 0600 79.3 kg (174 lb 13.2 oz)   07/10/23 0500 78.8 kg (173 lb 11.6 oz)       Intake/Output Summary (Last 24 hours) at 2023 1257  Last data filed at 2023 1232  Gross per 24 hour   Intake 2680.69 ml   Output 3670 ml   Net -989.31 ml         PHYSICAL EXAM    Constitutional: NAD, comfortable  Cardiovascular: RRR   Respiratory: CTAB  Abdomen: non distended, nontender to palpation        Additional Comments:  ROS, FH, SH: See initial GI consult for details.    I have reviewed the patient's new clinical lab results:    Recent Labs   Lab Test 23  0457 07/10/23  0528 23  0531   WBC 14.8* 14.9* 11.6*   HGB 10.8* 10.9* 10.7*   MCV 87 87 87    350 263     Recent Labs   Lab Test 23  1047 23  0407 23  0457 07/10/23  0528   NA  --  141 130* 137   POTASSIUM 4.0 3.6  3.2* 4.5 4.1   CHLORIDE  --  105 96* 101   CO2  --  22 26 24   BUN  --  3.6* 2.9* 3.8*   CR  --  0.64* 0.63* 0.67   ANIONGAP  --  14 8 12   KERRIE  --  8.3* 8.8 8.7     Recent Labs   Lab Test 23  0457 07/10/23  1339 23  0456 23  0501 23  0054 23  2358 04/10/23  1804 10/30/21  0318 20  2130 16  1938 16  1903 16  1825   ALBUMIN 2.8*  --  2.7* 2.9*   < >  --  4.9 3.6   < >  --    < >  --    BILITOTAL 0.4  --  0.7 0.6   < >  --  0.6 1.0   < >  --    < >  --    ALT 23  --  32 47   < >  --  73* 57   < >  --    < >  --    AST 27  --  29 38   < >  --  59* 34   < >  --    < >  --    ALKPHOS 71  --  " 58 59   < >  --  99 94   < >  --    < >  --    PROTEIN  --  10*  --   --   --   --   --   --   --  Negative  --  10*   LIPASE  --   --   --   --   --  1,083* 80* 131  --   --   --   --     < > = values in this interval not displayed.       7/4/23 CT:  1.  Acute interstitial edematous pancreatitis of the pancreas with associated cystic dystrophy of the proximal duodenal wall. No drainable peripancreatic fluid collections   2.  Small 2.2 cm hypoattenuating focus in the pancreatic tail, indeterminate for a small, organized peripancreatic fluid collection versus a pancreatic lesion. Recommend follow-up pancreatic protocol MRI in 4-6 weeks for further characterization after resolution of current episode of acute pancreatitis.  3.  Mild wall thickening of the distal esophagus suggesting esophagitis.   4.  Moderate hepatic steatosis.     7/10/23 CT:  1.  Worsening acute interstitial pancreatitis compared to 7/4/2023.  2.  New short segment thrombus in the superior mesenteric and proximal  portal vein secondary to #1.  3.  New small bilateral pleural effusions and bilateral lower lobe  opacities, atelectasis versus pneumonitis.        CONSULTATION ASSESSMENT AND PLAN:    Principal Problem:        Active Problems:    Alcohol-induced acute pancreatitis, unspecified complication status    Abnormal CT, pancreatic lesion    Assessment: Patient admitted 7/4/23 with pancreatitis and EtOH withdrawal with seizure day of admission.  Has been to treatment multiple times in the past for EtOH dependence.  Currently in the ICU due to complex management of EtOH withdrawal. CT on admission noted possible pancreatic fluid collection v. pancreatic lesion.  Repeat CT 7/10/23 with worsening of pancreatitis, new, acute SMV and PV thrombus.  Receiving thiamine and folic acid. Tolerating diet.      Thrombus  likely related to the acute pancreatitis and should improve with improvement of the pancreatitis.  No clear guidelines on anticoagulation,  but would hold off for now as likely to improve on its own.            Plan:    - if evidence of worsening abdominal pain, consider repeating CT in coming days which will also assess thrombus  -  outpatient EUS in about 6 weeks to rule out pancreatic tail lesion, [facility] will arrange.    I will not plan to actively follow this patient.  Please call if we can be of assistance, I would be happy to see him again.      Zita Gill  [facility]  Office:  [phone]    Approximately 15 minutes of total time was spent providing patient care, including patient evaluation, reviewing documentation/test results, and .

## 2023-07-12 NOTE — PROGRESS NOTES
Neuro:     2000hr Assessment. Patient Opens eyes with moderate repeated stimuli. PERRLA 3mm. Resting with intermittent periods of restlessness. Agitated with stimuli and attempts to assess and communicate with pt, swatting out aimlessly with attempts to wake.    2100hr Assessment. Awake, drowsy. Oriented to self, Hospital and Diagnosis, disoriented to date. Irritated/restless, continuously trying to roll over bed railing despite request to stop due to safety. Argues with staff but is overall non aggressive and apologizes if he inadvertently bumps staff. Takes medications when rationale is explained. PRN Seroquel and Melatonin given PO. Tylenol given for low grade temp.       Precedex gtt currently at 1.1mcg/kg/hr.  Scheduled Valproate, Phenobarbital taper and Clonidine. All extremities moving purposefully. Goal to wean Precedex. Sitter at bedside. Seizure precautions. Scoring 5-8 on CIWA primarily for orientation and agitation.     CV: Low grade Temp 99.4, personal fan turned on. SR to ST,  HR 's. VS otherwise stable. Palpable pulses     Resp: On RA. LS as follows: RUL clear, JAIMEE slight crackles and bases are diminished.     GI/: BS normal active. Only had a couple bites of dinner. Multiple loose stools reported per previous shift. Voiding to urinal with assistance, also a source of restlessness and attempts to leave bed. Glucose 88 at 2100hrs, Per report BS 63 on previous shift. On coming nurse notified of intermittent low blood glucose.     Bed Linen and gown changed x2 due to urinary incont/missing urinal. Head shampooed.         Phosphorus drawn and sent to lab.

## 2023-07-12 NOTE — PROGRESS NOTES
[facility]    Medicine Progress Note - Hospitalist Service    Date of Admission:  7/4/2023    Assessment & Plan   Dariusz Ferreira is a 42 year old male admitted on 7/4/2023.  He presents with alcohol withdrawal complicated by withdrawal seizure. Patient with prolonged ICU stay with high dose Precedex required due to agitation. Eventually given phenobarbital taper and had slow improvement in mentation.        Alcohol withdrawal with alcohol withdrawal seizure  Alcohol use disorder, severe  Possible benzodiazipine induced delirium  Delirium tremens  * Longstanding hx alcohol use disorder. Has been to treatment 5+ times in the past. States binge drinks, has been drinking heavily last 2 weeks, stopped Saturday, withdrawal started Sunday and abdominal pain on admission. In ED tachy 100s, hypertensive, afebrile. WBC 16.5, hgb 19.1 (hemoconcentration). Etoh negative. Given phenobarbital 260 mg x 1 in ED. EKG ST.   * Did suffer a generalized tonic clonic seizure the day of admission, and transferred to the ICU with initiation of precedex infusion.  * psych consulted 7/10 but patient altered, see note regarding medication recs     - CIWA, (no further benzo use due to concern for benzo-induced delirium)  - continue phenobarb taper, valproic acid (initiated 7/11), clonidine 0.1 mg tid (initiated 7/11)  - Out of window for concern for further seizures  - continue precedex, titrate off as able  - continue haldol, zyprexa, seroquel as needed for agitation  - vitamins including IV thiamine (wernickes panel)  - will need chemical dependence eval     Fever  Presumed aspiration PNA  Acute hypoxemic respiratory failure, resolved  * CXR 7/7 shows increased infiltrates after febrile on 7/6, 7/7, 7/9, 7/10  * CT head 7/10 w/o acute findings  * CT CAP 7/10 with worsening pancreatitis with associated clot, lung infiltrates vs pneumonitis  * leg USG 7/10 without clot  * UA 7/10 non-infected  * treated with  cefepime 7/6-7/10 when switched to levofloxacin due to concern for cefepime worsening AMS  * MRSA nasal swab negative so vanco stopped.   - continue levofloxacin (day 7 antibiotics)  - blood cultures 7/10 ngtd  - afebrile past 24 hours    Interstitial pancreatitis, alcohol induced  Thrombus of superior mesenteric and proximal portal vein  Transaminitis/ elevate bilirubin, resolved  Pancreatic tail lesion  Alcoholic gastritis   * With epigastric pain. Lipase 1083. AST/ /109. TB 1.5. acute LFT abnormalities 2/2 Etoh.    * CT with acute interstitial pancreatitis  - IV fluids to remain   - pantoprazole 40 mg IV BID  - prn analgesics, antiemetics  - appreciate GI consultation   - no anticoagulation for clot  - if worsened abdominal pain would re-image abdomen   - plan outpatient EUS in 6 weeks to evaluate pancreatic tail lesion     Acute toxic metabolic encephalopathy  Due to medication for withdrawal and likely underlying DTs  - wean sedatives as tolerates     Critical illness myopathy  - PT/OT     Dysphagia  * likely related to critical illness  - SLP cleared for reg diet     Hypokalemia  Hypophosphatemia  - replace per protocol     Mood disorder  [needs rec- bupropion 300 mg daily, fluvoxamine 200 mg at HS, lamotrigine 300 mg daily]  - do NOT resume buproprion when able to take PO, would hold until able to stay sober due to decreased seizure threshold  - resume fluvoxamine when able to take PO  - will need to uptitrate lamotrigine when able to take PO      Hx methamphetamine use disorder  Has had MSSA abscesses 2/2 IV meth use in the past. Denies meth use for several years.          Diet: Regular Diet Adult Thin Liquids (level 0) (1:1 assist/supervision, NO STRAW, small sips with assist, slow rate, sit at 90 degrees, pills with puree)    DVT Prophylaxis: Enoxaparin (Lovenox) SQ  Martin Catheter: Not present  Lines: PRESENT      PICC 07/09/23 Triple Lumen Right Basilic Ok for immediate use-Site Assessment: MORGAN     "  Cardiac Monitoring: ACTIVE order. Indication: ICU  Code Status: Full Code      Clinically Significant Risk Factors        # Hypokalemia: Lowest K = 3.2 mmol/L in last 2 days, will replace as needed       # Hypoalbuminemia: Lowest albumin = 2.7 g/dL at 7/6/2023  4:56 AM, will monitor as appropriate            # Overweight: Estimated body mass index is 27.55 kg/m  as calculated from the following:    Height as of this encounter: 1.702 m (5' 7\").    Weight as of this encounter: 79.8 kg (175 lb 14.8 oz).           Disposition Plan     Expected Discharge Date: 07/12/2023                  Lorenzo Beavers MD  Hospitalist Service  [facility]  Securely message with [email or web address] (more info)  Text page via [employer] Paging/Directory   ______________________________________________________________________    Interval History   Offers no complaints, feeling near baseline.  Denies any pain, fever/chills, dyspnea or confusion.    Physical Exam   Vital Signs: Temp: 98.2  F (36.8  C) Temp src: Axillary BP: 98/57 Pulse: 80   Resp: 10 SpO2: 95 % O2 Device: None (Room air)    Weight: 175 lbs 14.83 oz    General Appearance: Well nourished male in NAD  Respiratory: lungs CTAB  Cardiovascular: RRR, normal s1/s2 without murmur  GI: normal bowel sounds  Skin: no edema  Other: Oriented x3 and situation, but later makes confused statements about nursing staff     Medical Decision Making       35 MINUTES SPENT BY ME on the date of service doing chart review, history, exam, documentation & further activities per the note.      Data     I have personally reviewed the following data over the past 24 hrs:    N/A  \   N/A   / N/A     141 105 3.6 (L) /  101 (H)   4.0 22 0.64 (L) \       "

## 2023-07-12 NOTE — PROGRESS NOTES
07/12/23 1701   Appointment Info   Signing Clinician's Name / Credentials (PT) Norma PT, DPT   Living Environment   People in Home spouse   Current Living Arrangements house   Home Accessibility stairs to enter home;stairs within home   Number of Stairs, Within Home, Primary greater than 10 stairs   Stair Railings, Within Home, Primary railings safe and in good condition   Transportation Anticipated family or friend will provide   Living Environment Comments Lives with , Zeyad, in house. INitially reprots they have dogs, but then states they have since been put down-tearful. Pt cognition waxes, unsure if he is recalling a recent or past event.   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   General Information   Onset of Illness/Injury or Date of Surgery 07/04/23   Referring Physician Wilbur Gatica MD   Patient/Family Therapy Goals Statement (PT) Pt tearful, remorseful when reporting why he is in the hospital.   Pertinent History of Current Problem (include personal factors and/or comorbidities that impact the POC) 42 year old male admitted on 7/4/2023. He presents with alcohol withdrawal complicated by withdrawal seizure. Patient with prolonged ICU stay with high dose Precedex required due to agitation. Eventually given phenobarbital taper and had slow improvement in mentation.   Existing Precautions/Restrictions fall   General Observations Somewhat deshvoled; sitter in room   Cognition   Affect/Mental Status (Cognition) confused;emotionally labile   Orientation Status (Cognition) person  (Oriented to place with assist to recall and continued reorientationt hroughout)   Follows Commands  (Cognition) follows one-step commands;over 90% accuracy;increased processing time needed;physical/tactile prompts required;initiation impaired;verbal cues/prompting required;repetition of directions required   Safety Deficit (Cognition) minimal deficit;moderate deficit;impulsivity;insight into deficits/self-awareness;judgment;awareness of need for assistance   Cognitive Status Comments Command following improves with time and repitition.   Pain Assessment   Patient Currently in Pain No   Integumentary/Edema   Integumentary/Edema Comments General edema, trace in the feet/ankles   Posture    Posture Protracted shoulders   Range of Motion (ROM)   ROM Comment B LEs WFL, B UEs WFL   Strength (Manual Muscle Testing)   Strength Comments Demonstrates antigravity strength with mobility, significantlyimpaired activity tolerance from baseline.   Bed Mobility   Comment, (Bed Mobility) Sup>sit, abrupt movement to reach upright-close SB Boubacar CGA.   Transfers   Comment, (Transfers) Poor mechanics and anterior weight shift. Sit>Stand Mod A x 2 for attempt to stand initally, progressed insession see treatment.   Gait/Stairs (Locomotion)   Comment, (Gait/Stairs) Bedside giat with Min A x 2, needs B UE support secondary to instability. Shuffles feet, downward gaze.   Balance   Balance Comments Sitting balance fair + not safe to reach to the floor secondary to poor spacial awareness. STanding balance, once in midline and equal weight static balance CGA. Dynamic standing balance needs significant B UE support.   Coordination   Coordination Comments Over shoots with movements at times, othertimes very small movements occur-with reaching, stepping and shifting weight in standing and sitting.   Clinical Impression   Criteria for Skilled Therapeutic Intervention Yes, treatment indicated   PT Diagnosis (PT) Impaired gait   Influenced by the following impairments WEakness, fatigue, decreased balance, waxing cognition   Functional limitations  due to impairments Decreased functional independence   Clinical Presentation (PT Evaluation Complexity) Stable/Uncomplicated   Clinical Presentation Rationale see MRl   Clinical Decision Making (Complexity) low complexity   Planned Therapy Interventions (PT) balance training;bed mobility training;gait training;home exercise program;motor coordination training;neuromuscular re-education;patient/family education;ROM (range of motion);stair training;strengthening;stretching;transfer training;progressive activity/exercise;risk factor education;home program guidelines   Risk & Benefits of therapy have been explained care plan/treatment goals reviewed;risks/benefits reviewed;evaluation/treatment results reviewed;current/potential barriers reviewed;participants voiced agreement with care plan;participants included;patient   PT Total Evaluation Time   PT Eval, Low Complexity Minutes (14456) 10   Physical Therapy Goals   PT Frequency Daily   PT Predicted Duration/Target Date for Goal Attainment 07/18/23   PT Goals Bed Mobility;Transfers;Gait;Stairs;PT Goal 1   PT: Bed Mobility Independent;Supine to/from sit;Rolling   PT: Transfers Supervision/stand-by assist;Sit to/from stand   PT: Gait Supervision/stand-by assist;Assistive device;Greater than 200 feet   PT: Stairs Supervision/stand-by assist;Greater than 10 stairs;Rail on both sides   PT: Goal 1 Pt will score> 19/24 on the DGI with indication of decreased fall risk   Interventions   Interventions Quick Adds Gait Training;Neuromuscular Re-ed;Therapeutic Activity;Therapeutic Procedure   Therapeutic Activity   Therapeutic Activities: dynamic activities to improve functional performance Minutes (58932) 10   Treatment Detail/Skilled Intervention Corx for optimal functional mobility. Repeat sit<> get steady from get steady seat, supervision. Next from bed, still in egt steady, cued to place hands on lap-stood with close sueprvision/CGA x 5. Next removed get steady and  "pt able to stand x 3 from bed with just CGA x2. Pt did not think he could do it,but did well with gentle progression and praise. Stand>sit in recliner, abrupt-asked pt to tand and demontreate better control and he did so-CGA. Up in recliner, all needs inreach, VSS thorughout with elevated  max 130s throughout-pt in w/d and expected, RN aware. Pt reports slight clamy feeling, but \"This is how it goes, this isn't the first time I've done this.\" Sitter at bedside at PT /OT exit.   Gait Training   Gait Training Minutes (07515) 15   Treatment Detail/Skilled Intervention Gait training included pre gait in get steady, heel lifts, marching and latera weight shifts. Progressed to standing outside of the get stedy, lateral weight shifts and side stepping, next  verbal and tactile cues for posture and gaze, longer step length-Provided B HH A, and CGA at the R hip for weight shift. A x2 for safety. Ambulated 20' in room.   PT Discharge Planning   PT Plan Transfers; gait in hudson, trial a alker   PT Discharge Recommendation (DC Rec) Transitional Care Facility   PT Rationale for DC Rec Recommend TCU at this thime; however, suspect with continued medical stability strength and mobility will greatly improve. Could improve to be able to disch home with .   PT Brief overview of current status CGA/Min A x 2 for OOB mobility.   Total Session Time   Timed Code Treatment Minutes 25   Total Session Time (sum of timed and untimed services) 35     "

## 2023-07-12 NOTE — PROGRESS NOTES
"SPIRITUAL HEALTH SERVICES Progress Note  [facility]  ICU    Saw pt Dariusz LEON Hanna per patient request communicated to CC.    Patient/Family Understanding of Illness and Goals of Care - Ashish stated multiple times that drinking is the underlying problem in his life. He expressed a desire to stop drinking saying \"I hate it and don't want to do it anymore.\" Specifically, he indicated that he will enroll in the [facility] treatment facility after discharge from hospital. He expressed some hesitation about telling this to his spouse Zeyad. Ashish said that he has been sober for 3 weeks while in hospital, making this a good time to seek treatment and begin the process of recovery.    Distress and Loss - Ashish tearfully expressed feelings of anxiety and guilt about his past and painful memories from his childhood. He states that \"I should be in senior living\" referring to his most recent DWI. He reports that his most vivid memories are related to the family drinking at family gatherings..    Strengths, Coping, and Resources - Ashish states that he depends upon his spouse Zeyad for support. He said that \"Zeyad would do anything for me.\" He also disclosed that he and Zeyad frequently drink together. He reports that the couple stopped drinking during the COVID pandemic but subsequently lapsed into old behaviors. He stated emphatically that this was the best time that he and his spouse have enjoyed in their 14 years of marriage.    Meaning, Beliefs, and Spirituality - Not discussed this visit.    Lorenzo Cantor  Associate    "

## 2023-07-13 ENCOUNTER — APPOINTMENT (OUTPATIENT)
Dept: OCCUPATIONAL THERAPY | Facility: CLINIC | Age: 43
DRG: 896 | End: 2023-07-13
Payer: COMMERCIAL

## 2023-07-13 ENCOUNTER — APPOINTMENT (OUTPATIENT)
Dept: PHYSICAL THERAPY | Facility: CLINIC | Age: 43
DRG: 896 | End: 2023-07-13
Payer: COMMERCIAL

## 2023-07-13 ENCOUNTER — APPOINTMENT (OUTPATIENT)
Dept: SPEECH THERAPY | Facility: CLINIC | Age: 43
DRG: 896 | End: 2023-07-13
Payer: COMMERCIAL

## 2023-07-13 LAB
ANION GAP SERPL CALCULATED.3IONS-SCNC: 11 MMOL/L (ref 7–15)
BUN SERPL-MCNC: 2.1 MG/DL (ref 6–20)
CALCIUM SERPL-MCNC: 8.7 MG/DL (ref 8.6–10)
CHLORIDE SERPL-SCNC: 104 MMOL/L (ref 98–107)
CREAT SERPL-MCNC: 0.71 MG/DL (ref 0.67–1.17)
DEPRECATED HCO3 PLAS-SCNC: 22 MMOL/L (ref 22–29)
ERYTHROCYTE [DISTWIDTH] IN BLOOD BY AUTOMATED COUNT: 13.2 % (ref 10–15)
GFR SERPL CREATININE-BSD FRML MDRD: >90 ML/MIN/1.73M2
GLUCOSE SERPL-MCNC: 98 MG/DL (ref 70–99)
HCT VFR BLD AUTO: 32.1 % (ref 40–53)
HGB BLD-MCNC: 10.6 G/DL (ref 13.3–17.7)
MAGNESIUM SERPL-MCNC: 1.9 MG/DL (ref 1.7–2.3)
MCH RBC QN AUTO: 28.9 PG (ref 26.5–33)
MCHC RBC AUTO-ENTMCNC: 33 G/DL (ref 31.5–36.5)
MCV RBC AUTO: 88 FL (ref 78–100)
PHOSPHATE SERPL-MCNC: 3.5 MG/DL (ref 2.5–4.5)
PLATELET # BLD AUTO: 598 10E3/UL (ref 150–450)
POTASSIUM SERPL-SCNC: 3.8 MMOL/L (ref 3.4–5.3)
RBC # BLD AUTO: 3.67 10E6/UL (ref 4.4–5.9)
SODIUM SERPL-SCNC: 137 MMOL/L (ref 136–145)
WBC # BLD AUTO: 12.3 10E3/UL (ref 4–11)

## 2023-07-13 PROCEDURE — 97530 THERAPEUTIC ACTIVITIES: CPT | Mod: GP | Performed by: PHYSICAL THERAPIST

## 2023-07-13 PROCEDURE — 250N000009 HC RX 250: Performed by: HOSPITALIST

## 2023-07-13 PROCEDURE — 258N000003 HC RX IP 258 OP 636: Performed by: STUDENT IN AN ORGANIZED HEALTH CARE EDUCATION/TRAINING PROGRAM

## 2023-07-13 PROCEDURE — 250N000011 HC RX IP 250 OP 636: Performed by: HOSPITALIST

## 2023-07-13 PROCEDURE — 97535 SELF CARE MNGMENT TRAINING: CPT | Mod: GO

## 2023-07-13 PROCEDURE — 120N000001 HC R&B MED SURG/OB

## 2023-07-13 PROCEDURE — 85027 COMPLETE CBC AUTOMATED: CPT | Performed by: HOSPITALIST

## 2023-07-13 PROCEDURE — 250N000009 HC RX 250: Performed by: STUDENT IN AN ORGANIZED HEALTH CARE EDUCATION/TRAINING PROGRAM

## 2023-07-13 PROCEDURE — 80048 BASIC METABOLIC PNL TOTAL CA: CPT | Performed by: HOSPITALIST

## 2023-07-13 PROCEDURE — 84100 ASSAY OF PHOSPHORUS: CPT | Performed by: HOSPITALIST

## 2023-07-13 PROCEDURE — 250N000011 HC RX IP 250 OP 636: Performed by: INTERNAL MEDICINE

## 2023-07-13 PROCEDURE — 97116 GAIT TRAINING THERAPY: CPT | Mod: GP | Performed by: PHYSICAL THERAPIST

## 2023-07-13 PROCEDURE — 83735 ASSAY OF MAGNESIUM: CPT | Performed by: HOSPITALIST

## 2023-07-13 PROCEDURE — 250N000013 HC RX MED GY IP 250 OP 250 PS 637: Performed by: STUDENT IN AN ORGANIZED HEALTH CARE EDUCATION/TRAINING PROGRAM

## 2023-07-13 PROCEDURE — C9113 INJ PANTOPRAZOLE SODIUM, VIA: HCPCS | Performed by: INTERNAL MEDICINE

## 2023-07-13 PROCEDURE — 92526 ORAL FUNCTION THERAPY: CPT | Mod: GN | Performed by: SPEECH-LANGUAGE PATHOLOGIST

## 2023-07-13 PROCEDURE — 258N000003 HC RX IP 258 OP 636: Performed by: HOSPITALIST

## 2023-07-13 PROCEDURE — 99232 SBSQ HOSP IP/OBS MODERATE 35: CPT | Performed by: HOSPITALIST

## 2023-07-13 PROCEDURE — 250N000011 HC RX IP 250 OP 636: Performed by: STUDENT IN AN ORGANIZED HEALTH CARE EDUCATION/TRAINING PROGRAM

## 2023-07-13 PROCEDURE — 250N000013 HC RX MED GY IP 250 OP 250 PS 637: Performed by: HOSPITALIST

## 2023-07-13 RX ORDER — PANTOPRAZOLE SODIUM 40 MG/1
40 TABLET, DELAYED RELEASE ORAL
Status: DISCONTINUED | OUTPATIENT
Start: 2023-07-13 | End: 2023-07-14 | Stop reason: HOSPADM

## 2023-07-13 RX ORDER — FOLIC ACID 1 MG/1
1 TABLET ORAL DAILY
Status: DISCONTINUED | OUTPATIENT
Start: 2023-07-14 | End: 2023-07-14 | Stop reason: HOSPADM

## 2023-07-13 RX ORDER — FLUVOXAMINE MALEATE 100 MG
100 TABLET ORAL AT BEDTIME
Status: DISCONTINUED | OUTPATIENT
Start: 2023-07-13 | End: 2023-07-14 | Stop reason: HOSPADM

## 2023-07-13 RX ADMIN — HALOPERIDOL LACTATE 5 MG: 5 INJECTION, SOLUTION INTRAMUSCULAR at 02:42

## 2023-07-13 RX ADMIN — PHENOBARBITAL SODIUM 32.4 MG: 65 INJECTION INTRAMUSCULAR; INTRAVENOUS at 23:58

## 2023-07-13 RX ADMIN — VALPROATE SODIUM 500 MG: 100 INJECTION, SOLUTION INTRAVENOUS at 10:25

## 2023-07-13 RX ADMIN — THIAMINE HYDROCHLORIDE 250 MG: 100 INJECTION, SOLUTION INTRAMUSCULAR; INTRAVENOUS at 07:26

## 2023-07-13 RX ADMIN — PANTOPRAZOLE SODIUM 40 MG: 40 INJECTION, POWDER, FOR SOLUTION INTRAVENOUS at 07:25

## 2023-07-13 RX ADMIN — LEVOFLOXACIN 500 MG: 500 INJECTION, SOLUTION INTRAVENOUS at 11:58

## 2023-07-13 RX ADMIN — QUETIAPINE FUMARATE 25 MG: 25 TABLET ORAL at 07:25

## 2023-07-13 RX ADMIN — VALPROATE SODIUM 500 MG: 100 INJECTION, SOLUTION INTRAVENOUS at 02:42

## 2023-07-13 RX ADMIN — ACETAMINOPHEN 650 MG: 325 TABLET, FILM COATED ORAL at 08:37

## 2023-07-13 RX ADMIN — FLUVOXAMINE MALEATE 100 MG: 100 TABLET ORAL at 22:10

## 2023-07-13 RX ADMIN — VALPROATE SODIUM 500 MG: 100 INJECTION, SOLUTION INTRAVENOUS at 22:10

## 2023-07-13 RX ADMIN — PANTOPRAZOLE SODIUM 40 MG: 40 TABLET, DELAYED RELEASE ORAL at 18:38

## 2023-07-13 RX ADMIN — FOLIC ACID 1 MG: 5 INJECTION, SOLUTION INTRAMUSCULAR; INTRAVENOUS; SUBCUTANEOUS at 07:26

## 2023-07-13 ASSESSMENT — ACTIVITIES OF DAILY LIVING (ADL)
ADLS_ACUITY_SCORE: 35
ADLS_ACUITY_SCORE: 28
ADLS_ACUITY_SCORE: 29
ADLS_ACUITY_SCORE: 34
ADLS_ACUITY_SCORE: 29
ADLS_ACUITY_SCORE: 34
ADLS_ACUITY_SCORE: 29
ADLS_ACUITY_SCORE: 34
ADLS_ACUITY_SCORE: 29
ADLS_ACUITY_SCORE: 34
ADLS_ACUITY_SCORE: 28
ADLS_ACUITY_SCORE: 28

## 2023-07-13 NOTE — PROGRESS NOTES
Patient remains alert this shift. Dex weaned off.  Intermittently tearful and panicked, but redirectable. Remains on Room air, vital signs stable.  Notable increase in strength this shift, transferring well with assist of 1

## 2023-07-13 NOTE — CONSULTS
"SPIRITUAL HEALTH SERVICES Progress Note    [facility]    Saw pt Dariusz (\"Ashish\") CAROLYN Ferreira per routine consult for support.    Patient/Family Understanding of Illness and Goals of Care - During our visit today, Ashish named that he arrived to [facility] due to problems with drinking. He tearfully mentioned that he has a desire to stop drinking, but reflected on difficulties with doing that because of his environment. He named that he hopes to go to treatment, but is seeking a place that affirms him as a bucio man. I offered support at the bedside today as he shared about his long journey with this disease.     Distress and Loss - In conversation, Ashish named multiple areas of distress:  The Good Matt: He talked today about how he regrets a lot of the decisions that he has made throughout dealing with this illness and stated \"I used to be such a good matt and now look at me.\" We reflected on how addiction can feel like an \"uncontrollable monster\" that obscures who we are. Ashish affirmed that he knew the \"good matt\" was still there and wants to work towards finding him again.   Social Engagements: Ashish reflected on going to events and hearing people \"be strong and say they don't drink.\" He noted that he didn't understand how they could do that and wants to find the strength to do the same. Ashish stated that, one day, he wants to be able to go up to a concession stand at the state fair and order a \"root beer\" or other non-alcoholic beverage and not worry about what others think. We briefly talked about challenges with dealing with the perceptions of others.   Family History: Ashish notes that both of his parents  young due to alcoholism and he reflected on how he understands that he is now 42. He talked about the difficulty of witnessing their struggle and doesn't want that to be his experience.      Strengths, Coping, and Resources - It was named that Ashish has a limited support network. He named that his boss at work has been sober for " "many years and has been encouraging him through this process.     Meaning, Beliefs, and Spirituality - Ashish noted that he grew up Hinduism and \"belives in God.\" However, he notes that he now finds strength in his spirituality that is activated when he is in beautiful places like \"the beach in [place] or [place].\"     Plan of Care -  support has been appreciated by Ashish and follow-up is welcome. I plan to visit regularly while on [address]. Please consult as needs arise.     ------  Thai Cantu M.Div.  Resident   [facility] Pager: [phone]  "

## 2023-07-13 NOTE — PROGRESS NOTES
"MD Notification    Notified Person: MD    Notified Person Name: MD Ruthann    Notification Date/Time: 07/13 at 1433    Notification Interaction: vocera     Purpose of Notification: FYI: pt declined LOVENOX inj. Pt states \" I'm trying to stop taking any kind of medications that are not necessary \"    Orders Received: awaiting     Comments:    "

## 2023-07-13 NOTE — PROGRESS NOTES
[facility]    Medicine Progress Note - Hospitalist Service    Date of Admission:  7/4/2023    Assessment & Plan   Dariusz Ferreira is a 42 year old male admitted on 7/4/2023.  He presents with alcohol withdrawal complicated by withdrawal seizure. Patient with prolonged ICU stay with high dose Precedex required due to agitation. Eventually given phenobarbital taper and had slow improvement in mentation.        Alcohol withdrawal with alcohol withdrawal seizure  Alcohol use disorder, severe  Possible benzodiazipine induced delirium  Delirium tremens  * Longstanding hx alcohol use disorder. Has been to treatment 5+ times in the past. States binge drinks, has been drinking heavily last 2 weeks, stopped Saturday, withdrawal started Sunday and abdominal pain on admission. In ED tachy 100s, hypertensive, afebrile. WBC 16.5, hgb 19.1 (hemoconcentration). Etoh negative. Given phenobarbital 260 mg x 1 in ED. EKG ST.   * Did suffer a generalized tonic clonic seizure the day of admission, and transferred to the ICU with initiation of precedex infusion.  * psych consulted 7/10 but patient altered, see note regarding medication recs     - CIWA, (no further benzo use due to concern for benzo-induced delirium)  - continue phenobarb taper, valproic acid (initiated 7/11)  - stop clonidine as primarily normotensive  - Out of window for concern for further seizures  - weaned off precedex morning of 07/13/23   - continue haldol, zyprexa, seroquel as needed for agitation  - continue thiamine, folate, MVI  - will need chemical dependence eval     Fever  Presumed aspiration PNA  Acute hypoxemic respiratory failure, resolved  * CXR 7/7 shows increased infiltrates after febrile on 7/6, 7/7, 7/9, 7/10  * CT head 7/10 w/o acute findings  * CT CAP 7/10 with worsening pancreatitis with associated clot, lung infiltrates vs pneumonitis  * leg USG 7/10 without clot  * UA 7/10 non-infected  * treated with cefepime 7/6-7/10  "when switched to levofloxacin due to concern for cefepime worsening AMS  * MRSA nasal swab negative so vanco stopped.   - continue levofloxacin with recurrent fever and leukocytosis on cefepime will plan to complete empiric course of levo (day 4/5)   - blood cultures 7/10 ngtd  - afebrile past 28 hours, WBC improving    Interstitial pancreatitis, alcohol induced  Thrombus of superior mesenteric and proximal portal vein  Transaminitis/ elevate bilirubin, resolved  Pancreatic tail lesion  Alcoholic gastritis   * With epigastric pain. Lipase 1083. AST/ /109. TB 1.5. acute LFT abnormalities 2/2 Etoh.    * CT with acute interstitial pancreatitis  - pantoprazole 40 mg BID  - no anticoagulation for clot  - if worsened abdominal pain would re-image abdomen   - plan outpatient EUS in 6 weeks to evaluate pancreatic tail lesion; [facility] signed off     Acute toxic metabolic encephalopathy, resolving  Due to medication for withdrawal and likely underlying DTs  - wean sedatives as tolerates  -  at bedside feels he is \"98% back to normal\"     Critical illness myopathy  - PT/OT recommend TCU     Dysphagia  * likely related to critical illness  - SLP cleared for reg diet     Hypokalemia  Hypophosphatemia  - replace per protocol     Mood disorder  [needs rec- bupropion 300 mg daily, fluvoxamine 200 mg at HS, lamotrigine 300 mg daily]  - do NOT resume buproprion when able to take PO, would hold until able to stay sober due to decreased seizure threshold  - resume fluvoxamine 07/13/23   - will need to uptitrate lamotrigine (25 mg daily x2 weeks) once off valproic acid     Hx methamphetamine use disorder  Has had MSSA abscesses 2/2 IV meth use in the past. Denies meth use for several years.          Diet: Regular Diet Adult Thin Liquids (level 0) (1:1 assist/supervision, NO STRAW, small sips with assist, slow rate, sit at 90 degrees, pills with puree)    DVT Prophylaxis: Enoxaparin (Lovenox) SQ  Martin Catheter: Not " "present  Lines: PRESENT      PICC 07/09/23 Triple Lumen Right Basilic Ok for immediate use-Site Assessment: WDL      Cardiac Monitoring: ACTIVE order. Indication: ICU  Code Status: Full Code      Clinically Significant Risk Factors        # Hypokalemia: Lowest K = 3.2 mmol/L in last 2 days, will replace as needed       # Hypoalbuminemia: Lowest albumin = 2.7 g/dL at 7/6/2023  4:56 AM, will monitor as appropriate            # Overweight: Estimated body mass index is 27.55 kg/m  as calculated from the following:    Height as of this encounter: 1.702 m (5' 7\").    Weight as of this encounter: 79.8 kg (175 lb 14.8 oz).           Disposition Plan      Expected Discharge Date: 07/15/2023      Destination: home with family;outpatient Tx          Lorenzo Beavers MD  Hospitalist Service  [facility]  Securely message with [employer] (more info)  Text page via [employer] Paging/Directory   ______________________________________________________________________    Interval History   Feeling much better today, more clear.  Had some back pain due to immobility.  No fever/chills or other complaints.     Physical Exam   Vital Signs: Temp: 98.8  F (37.1  C) Temp src: Oral BP: 117/82 Pulse: 109   Resp: 22 SpO2: 98 % O2 Device: None (Room air)    Weight: 175 lbs 14.83 oz    General Appearance: Well nourished male in NAD  Respiratory: lungs CTAB, no wheezes or crackles  Cardiovascular: RRR, normal s1/s2 without murmur  GI: normal bowel sounds, soft, nontender  Skin: no edema  Other: Alert and appropriate, cranial nerves grossly intact     Medical Decision Making       35 MINUTES SPENT BY ME on the date of service doing chart review, history, exam, documentation & further activities per the note.  MANAGEMENT DISCUSSED with the following over the past 24 hours: bedside RN, patient's    Medical complexity over the past 24 hours:  - Prescription DRUG MANAGEMENT performed      Data     I have personally reviewed the " following data over the past 24 hrs:    12.3 (H)  \   10.6 (L)   / 598 (H)     137 104 2.1 (L) /  98   3.8 22 0.71 \

## 2023-07-13 NOTE — PLAN OF CARE
Goal Outcome Evaluation:    Summary:  Alcohol withdrawal with seizure. Transfer from ICU  DATE & TIME: 07/13/23 Day    Cognitive Concerns/ Orientation : A&O x4   BEHAVIOR & AGGRESSION TOOL COLOR: : green  CIWA SCORE: 0, 1  ABNL VS/O2: : VSS  MOBILITY: Ind with walker to bathroom  PAIN MANAGMENT: : Denies  DIET: : Reg, excellent appetite  BOWEL/BLADDER: Continent. x3 BM this shift, voiding  ABNL LAB/BG: K &Ph protocol: WNL. WBC.12.3  DRAIN/DEVICES: PICC right arm, patent, intermittent ABX  TELEMETRY RHYTHM: : NA, since coming up from ICU, no tele placed.  SKIN: : intact  TESTS/PROCEDURES:  NA  D/C DAY/GOALS/PLACE:  TBD  OTHER IMPORTANT INFO:  PT/OT evaluated pt today.  Zeyad and pt have discussed plans for pt to transition to [facility] in [place] for CD.

## 2023-07-13 NOTE — PROGRESS NOTES
Speech Language Therapy Discharge Summary    Reason for therapy discharge:    All goals and outcomes met, no further needs identified.    Progress towards therapy goal(s). See goals on Care Plan in [facility] electronic health record for goal details.  Goals met    Therapy recommendation(s):    No further therapy is recommended.  Voice quality is improving with no current voice needs present.       07/13/23 0968   SLP Discharge Planning   SLP Plan Discontinue SLP   SLP Discharge Recommendation home   SLP Rationale for DC Rec Swallow Tx goal has been met.   SLP Brief overview of current status  Pt is tolerating a regular diet and thin liquids without aspiration signs;  Rec: continue a regular diet with safe swallow strategies

## 2023-07-14 ENCOUNTER — APPOINTMENT (OUTPATIENT)
Dept: OCCUPATIONAL THERAPY | Facility: CLINIC | Age: 43
DRG: 896 | End: 2023-07-14
Payer: COMMERCIAL

## 2023-07-14 VITALS
HEART RATE: 98 BPM | WEIGHT: 175.93 LBS | SYSTOLIC BLOOD PRESSURE: 126 MMHG | OXYGEN SATURATION: 100 % | BODY MASS INDEX: 27.61 KG/M2 | HEIGHT: 67 IN | TEMPERATURE: 98.3 F | RESPIRATION RATE: 16 BRPM | DIASTOLIC BLOOD PRESSURE: 82 MMHG

## 2023-07-14 LAB
ANION GAP SERPL CALCULATED.3IONS-SCNC: 13 MMOL/L (ref 7–15)
BUN SERPL-MCNC: 2.7 MG/DL (ref 6–20)
CALCIUM SERPL-MCNC: 8.9 MG/DL (ref 8.6–10)
CHLORIDE SERPL-SCNC: 103 MMOL/L (ref 98–107)
CREAT SERPL-MCNC: 0.69 MG/DL (ref 0.67–1.17)
DEPRECATED HCO3 PLAS-SCNC: 21 MMOL/L (ref 22–29)
ERYTHROCYTE [DISTWIDTH] IN BLOOD BY AUTOMATED COUNT: 13.4 % (ref 10–15)
GFR SERPL CREATININE-BSD FRML MDRD: >90 ML/MIN/1.73M2
GLUCOSE SERPL-MCNC: 100 MG/DL (ref 70–99)
HCT VFR BLD AUTO: 34.1 % (ref 40–53)
HGB BLD-MCNC: 11 G/DL (ref 13.3–17.7)
MAGNESIUM SERPL-MCNC: 1.8 MG/DL (ref 1.7–2.3)
MCH RBC QN AUTO: 28.7 PG (ref 26.5–33)
MCHC RBC AUTO-ENTMCNC: 32.3 G/DL (ref 31.5–36.5)
MCV RBC AUTO: 89 FL (ref 78–100)
PHOSPHATE SERPL-MCNC: 3.2 MG/DL (ref 2.5–4.5)
PLATELET # BLD AUTO: 704 10E3/UL (ref 150–450)
POTASSIUM SERPL-SCNC: 3.4 MMOL/L (ref 3.4–5.3)
RBC # BLD AUTO: 3.83 10E6/UL (ref 4.4–5.9)
SODIUM SERPL-SCNC: 137 MMOL/L (ref 136–145)
WBC # BLD AUTO: 11.3 10E3/UL (ref 4–11)

## 2023-07-14 PROCEDURE — 97535 SELF CARE MNGMENT TRAINING: CPT | Mod: GO

## 2023-07-14 PROCEDURE — 84100 ASSAY OF PHOSPHORUS: CPT | Performed by: HOSPITALIST

## 2023-07-14 PROCEDURE — G0463 HOSPITAL OUTPT CLINIC VISIT: HCPCS

## 2023-07-14 PROCEDURE — 80048 BASIC METABOLIC PNL TOTAL CA: CPT | Performed by: HOSPITALIST

## 2023-07-14 PROCEDURE — 258N000003 HC RX IP 258 OP 636: Performed by: HOSPITALIST

## 2023-07-14 PROCEDURE — 97530 THERAPEUTIC ACTIVITIES: CPT | Mod: GO

## 2023-07-14 PROCEDURE — 85027 COMPLETE CBC AUTOMATED: CPT | Performed by: HOSPITALIST

## 2023-07-14 PROCEDURE — 250N000009 HC RX 250: Performed by: HOSPITALIST

## 2023-07-14 PROCEDURE — 250N000011 HC RX IP 250 OP 636: Mod: JZ | Performed by: HOSPITALIST

## 2023-07-14 PROCEDURE — 99239 HOSP IP/OBS DSCHRG MGMT >30: CPT | Performed by: HOSPITALIST

## 2023-07-14 PROCEDURE — 999N000216 HC STATISTIC ADULT CD FACE TO FACE-NO CHRG

## 2023-07-14 PROCEDURE — 83735 ASSAY OF MAGNESIUM: CPT | Performed by: HOSPITALIST

## 2023-07-14 PROCEDURE — 250N000013 HC RX MED GY IP 250 OP 250 PS 637: Performed by: HOSPITALIST

## 2023-07-14 RX ORDER — PANTOPRAZOLE SODIUM 40 MG/1
40 TABLET, DELAYED RELEASE ORAL
Qty: 60 TABLET | Refills: 0 | Status: SHIPPED | OUTPATIENT
Start: 2023-07-14 | End: 2023-10-31

## 2023-07-14 RX ORDER — LANOLIN ALCOHOL/MO/W.PET/CERES
100 CREAM (GRAM) TOPICAL DAILY
Qty: 30 TABLET | Refills: 0 | Status: SHIPPED | OUTPATIENT
Start: 2023-07-15 | End: 2023-08-14

## 2023-07-14 RX ORDER — LAMOTRIGINE 25 MG/1
25 TABLET ORAL DAILY
Qty: 30 TABLET | Refills: 0 | Status: SHIPPED | OUTPATIENT
Start: 2023-07-15

## 2023-07-14 RX ORDER — FOLIC ACID 1 MG/1
1 TABLET ORAL DAILY
Qty: 30 TABLET | Refills: 0 | Status: SHIPPED | OUTPATIENT
Start: 2023-07-15 | End: 2023-08-14

## 2023-07-14 RX ORDER — LAMOTRIGINE 25 MG/1
25 TABLET ORAL DAILY
Status: DISCONTINUED | OUTPATIENT
Start: 2023-07-15 | End: 2023-07-14 | Stop reason: HOSPADM

## 2023-07-14 RX ORDER — POTASSIUM CHLORIDE 1500 MG/1
40 TABLET, EXTENDED RELEASE ORAL ONCE
Status: COMPLETED | OUTPATIENT
Start: 2023-07-14 | End: 2023-07-14

## 2023-07-14 RX ADMIN — THIAMINE HCL TAB 100 MG 100 MG: 100 TAB at 11:19

## 2023-07-14 RX ADMIN — VALPROATE SODIUM 500 MG: 100 INJECTION, SOLUTION INTRAVENOUS at 04:19

## 2023-07-14 RX ADMIN — PANTOPRAZOLE SODIUM 40 MG: 40 TABLET, DELAYED RELEASE ORAL at 06:30

## 2023-07-14 RX ADMIN — FOLIC ACID 1 MG: 1 TABLET ORAL at 11:19

## 2023-07-14 RX ADMIN — PANTOPRAZOLE SODIUM 40 MG: 40 TABLET, DELAYED RELEASE ORAL at 16:07

## 2023-07-14 RX ADMIN — POTASSIUM CHLORIDE 40 MEQ: 1500 TABLET, EXTENDED RELEASE ORAL at 11:19

## 2023-07-14 RX ADMIN — LEVOFLOXACIN 500 MG: 500 INJECTION, SOLUTION INTRAVENOUS at 12:16

## 2023-07-14 ASSESSMENT — ACTIVITIES OF DAILY LIVING (ADL)
ADLS_ACUITY_SCORE: 28

## 2023-07-14 NOTE — PROGRESS NOTES
Summary:  Alcohol withdrawal with seizure  DATE & TIME: 07/14 Day  Cognitive Concerns/ Orientation : A&O x4   BEHAVIOR & AGGRESSION TOOL COLOR: : green  CIWA SCORE: 0,0  ABNL VS/O2: : VSS  MOBILITY: Ind in Rm  PAIN MANAGMENT: : Denies  DIET: : Regular  BOWEL/BLADDER: Continent. States he's having loose BM's.  ABNL LAB/BG: K+ 3.4 - replaced per protocol.  DRAIN/DEVICES: PICC right arm, patent, intermittent ABX  TELEMETRY RHYTHM:  SKIN: : intact  TESTS/PROCEDURES:  NA  D/C DAY/GOALS/PLACE:  Home tomorrow then will check into [facility] for treatment when able, likely Sunday per facility.   OTHER IMPORTANT INFO:  CD consult placed - but not needed for [facility].

## 2023-07-14 NOTE — DISCHARGE SUMMARY
"[facility]  Hospitalist Discharge Summary      Date of Admission:  7/4/2023  Date of Discharge:  No discharge date for patient encounter.  Discharging Provider: Lorenzo Beavers MD  Discharge Service: Hospitalist Service    Discharge Diagnoses   Alcohol withdrawal with alcohol withdrawal seizure  Alcohol use disorder, severe  Delirium tremens  Acute toxic metabolic encephalopathy  Suspected aspiration pneumonia  Acute hypoxic respiratory failure  Interstitial pancreatitis due to alcohol use  Thrombus of superior mesenteric and proximal portal veins  Probable hepatitis due to alcohol use  Pancreatic tail lesion  Gastritis due to alcohol use  Critical illness myopathy  Dysphagia  Hypokalemia  Hypophosphatemia  Mood disorder  Hx methamphetamine use disorder       Clinically Significant Risk Factors     # Overweight: Estimated body mass index is 27.55 kg/m  as calculated from the following:    Height as of this encounter: 1.702 m (5' 7\").    Weight as of this encounter: 79.8 kg (175 lb 14.8 oz).       Follow-ups Needed After Discharge   Follow-up Appointments     Follow-up and recommended labs and tests       Follow up with primary care provider, [facility], within 7 days   for hospital follow- up.  The following labs/tests are recommended: CBC.  Follow up with outpatient psychiatrist in 2 weeks to discuss lamotrigine   titration.   Follow up with [facility] for endoscopic ultrasound (EUS)   in about 6 weeks to evaluate your pancreas.  You should be contacted by   clinic to schedule follow up.   Follow up with CD treatment program as planned.            Unresulted Labs Ordered in the Past 30 Days of this Admission     Date and Time Order Name Status Description    7/10/2023  9:04 AM Blood Culture Hand, Left Preliminary     7/10/2023  9:04 AM Blood Culture Hand, Left Preliminary       These results will be followed up by: hospitalist group; would expect and positive " culture to be contaminant    Discharge Disposition   Discharged to home  Condition at discharge: Stable    Hospital Course   Dariusz Ferreira is a 42 year old male admitted on 7/4/2023.  He presents with alcohol withdrawal complicated by withdrawal seizure. Patient with prolonged ICU stay with high dose Precedex required due to agitation. Eventually given phenobarbital taper and had slow improvement in mentation.  At mental baseline 7/14 and feels ready for discharge, however, will monitor through 7/15 to ensure remains stable with transition off withdrawal/delirium meds back to his PTA psychiatric meds.        Alcohol withdrawal with alcohol withdrawal seizure, resolved  Alcohol use disorder, severe  Possible benzodiazipine induced delirium, resolved  Delirium tremens, resolved  Acute toxic metabolic encephalopathy, resolved  Longstanding hx alcohol use disorder. Presented in active withdrawal with generalized tonic-clonic seizure on admission.  He was transferred to ICU and placed on precedex drip and CIWA protocol with prn benzos.  Experienced prolonged delirium in ICU - ultimately improved with discontinuation of benzos in favor of phenobarbital taper and initiation of valproice and prn seroquel, all of which were discontinued prior to discharge, with  reporting patient being at baseline mental status. He plans to attend residential treatment program at [facility] starting 7/16/23.       Presumed aspiration PNA, resolved  Acute hypoxemic respiratory failure, resolved  Initiated on cefepime 7/6 following fever with CXR 7/7 showing infiltrates.  Recurrent fever and leukocytosis 7/9 with CT CAP 7/10 showing worsening pancreatitis as only new potential cause for fever.  Cefepime was switched to levofloxacin 7/10 with improvement in leukocytosis and resolution of fever and completed 5-day course levo on day of discharge.     Interstitial pancreatitis, alcohol induced  Thrombus of superior mesenteric and  proximal portal vein  Probable hepatitis due to alcohol use  Pancreatic tail lesion  Gastritis due to alcohol use  Presents with epigastric pain. Lipase 1083. AST/ /109. TB 1.5.  CT 7/4 with acute interstitial pancreatitis and small 2.2 cm hypoattenuating focus in the pancreatic tail.  Repeat CT 7/10 showing worsening pancreatitis and new short segment thrombus in the superior mesenteric and proximal portal vein.  GI consulted, suspect thrombus due to pancreatitis, did not recommend anticoagulation as will likely resolve on its own.  Symptoms gradually improved and tolerating diet at discharge.  Plan outpatient EUS with [facility] in 6 weeks to evaluate pancreatic tail lesion.     Critical illness myopathy, resolved   Dysphagia, resolved   Hypokalemia, resolved  Hypophosphatemia, resolved     Mood disorder  Per Psychiatry, do NOT resume buproprion until able to stay sober due to decreased seizure threshold.  Continue PTA fluvoxamine.  PTA Lamictal was resumed at 25 mg daily starting 7/15/23 (minimum x2 weeks before up-titration); follow up with outpatient Psychiatrist.      Hx methamphetamine use disorder  Has had MSSA abscesses 2/2 IV meth use in the past. Denies meth use for several years.         Consultations This Hospital Stay   VASCULAR ACCESS ADULT IP CONSULT  VASCULAR ACCESS ADULT IP CONSULT  CARE MANAGEMENT / SOCIAL WORK IP CONSULT  PHARMACY TO DOSE VANCO  VASCULAR ACCESS ADULT IP CONSULT  VASCULAR ACCESS ADULT IP CONSULT  VASCULAR ACCESS ADULT IP CONSULT  VASCULAR ACCESS ADULT IP CONSULT  VASCULAR ACCESS ADULT IP CONSULT  VASCULAR ACCESS ADULT IP CONSULT  PSYCHIATRY IP CONSULT  GASTROENTEROLOGY IP CONSULT  SPEECH LANGUAGE PATH ADULT IP CONSULT  PHYSICAL THERAPY ADULT IP CONSULT  OCCUPATIONAL THERAPY ADULT IP CONSULT  SPIRITUAL HEALTH SERVICES IP CONSULT  CHEMICAL DEPENDENCY IP CONSULT    Code Status   Full Code    Time Spent on this Encounter   I, Lorenzo Beavers MD, personally saw the patient today and  spent greater than 30 minutes discharging this patient.       Lorenzo Beavers MD  [place] [address] [facility]  [address] [place] [address]  Phone: [phone]  ______________________________________________________________________    Physical Exam   Vital Signs: Temp: 98.3  F (36.8  C) Temp src: Oral BP: 126/82 Pulse: 98   Resp: 16 SpO2: 100 % O2 Device: None (Room air)    Weight: 175 lbs 14.83 oz  General Appearance:  Well nourished male in NAD, sitting up in bed  Respiratory: lungs CTAB, no wheezes or crackles  Cardiovascular: RRR, normal s1/s2 without murmur  GI: normal bowel sounds, soft, nontender  Skin: no edema  Other:  Alert and appropriate, cranial nerves grossly intact        Primary Care Physician   [facility]    Discharge Orders      Reason for your hospital stay    You were admitted for severe alcohol withdrawal which has resolved.  You were also found to have inflammation of the pancreas due to alcohol use as well as blood clot in the abdominal arteries related to pancreatic inflammation, which is resolving.  You were treated for suspected pneumonia as well.     Follow-up and recommended labs and tests     Follow up with primary care provider, [facility], within 7 days for hospital follow- up.  The following labs/tests are recommended: CBC.  Follow up with outpatient psychiatrist in 2 weeks to discuss lamotrigine titration.   Follow up with [facility] for endoscopic ultrasound (EUS) in about 6 weeks to evaluate your pancreas.  You should be contacted by clinic to schedule follow up.   Follow up with CD treatment program as planned.     Activity    Your activity upon discharge: activity as tolerated     When to contact your care team    Call your primary doctor or gastroenterology clinic if you have any of the following: worsening abdominal pain or persistent nausea/vomiting.     Diet    Follow this diet upon discharge:        Regular Diet Adult Thin Liquids (level 0) (Small bites/sips, sit at 90 degrees, stay up for 30-60 minutes after eating, alternate liquids and solids)       Significant Results and Procedures   Most Recent 3 CBC's:Recent Labs   Lab Test 07/14/23  0647 07/13/23  0513 07/11/23  0457   WBC 11.3* 12.3* 14.8*   HGB 11.0* 10.6* 10.8*   MCV 89 88 87   * 598* 416     Most Recent 3 BMP's:Recent Labs   Lab Test 07/14/23  0647 07/13/23  0513 07/12/23  1047 07/12/23  0407    137  --  141   POTASSIUM 3.4 3.8 4.0 3.6  3.2*   CHLORIDE 103 104  --  105   CO2 21* 22  --  22   BUN 2.7* 2.1*  --  3.6*   CR 0.69 0.71  --  0.64*   ANIONGAP 13 11  --  14   KERRIE 8.9 8.7  --  8.3*   * 98  --  101*     Most Recent 2 LFT's:Recent Labs   Lab Test 07/11/23  0457 07/06/23  0456   AST 27 29   ALT 23 32   ALKPHOS 71 58   BILITOTAL 0.4 0.7   ,   Results for orders placed or performed during the hospital encounter of 07/04/23   CT Abdomen Pelvis w Contrast    Narrative    EXAM: CT ABDOMEN PELVIS W CONTRAST  LOCATION: [facility]  DATE: 7/4/2023    INDICATION: Epigastric pain  COMPARISON: None.  TECHNIQUE: CT scan of the abdomen and pelvis was performed following injection of IV contrast. Multiplanar reformats were obtained. Dose reduction techniques were used.  CONTRAST: 88 mL Isovue 370    FINDINGS:   LOWER CHEST: Mild atelectasis is seen in the right lower lobe. No confluent consolidation or pleural effusion. Distal esophagus is mildly thick-walled.    HEPATOBILIARY: Moderate steatosis noted without focal lesions. Gallbladder is normal without radiodense stones.     PANCREAS: Diffuse peripancreatic fat stranding is seen throughout with edema in the retroperitoneal fascial planes. No drainable, organized peripancreatic fluid collections. A focal area of decreased enhancement is seen in the pancreatic tail measuring   2.2 x 1.5 cm on series 4 image 83.    SPLEEN: Normal.    ADRENAL GLANDS:  Normal.    KIDNEYS/BLADDER: Normal. 2 small to characterize cystic lesions in the left kidney statistically represents cysts and do not require dedicated imaging follow-up.    BOWEL: The second portion of the duodenum is thickened with scattered areas of cystic attenuation along the medial wall. Appendix is normal. No bowel obstruction.    LYMPH NODES: Mildly prominent aortocaval lymph nodes in the retroperitoneum are present, most compatible with reactive nature.    VASCULATURE: Unremarkable.    PELVIC ORGANS: Normal.    MUSCULOSKELETAL: Normal.        Impression    IMPRESSION:     1.  Acute interstitial edematous pancreatitis of the pancreas with associated cystic dystrophy of the proximal duodenal wall. No drainable peripancreatic fluid collections.    2.  Small 2.2 cm hypoattenuating focus in the pancreatic tail, indeterminate for a small, organized peripancreatic fluid collection versus a pancreatic lesion. Recommend follow-up pancreatic protocol MRI in 4-6 weeks for further characterization after   resolution of current episode of acute pancreatitis.    3.  Mild wall thickening of the distal esophagus suggesting esophagitis.    4.  Moderate hepatic steatosis.   CT Head w/o Contrast    Narrative    EXAM: CT HEAD W/O CONTRAST  LOCATION: [facility]  DATE: 7/4/2023    INDICATION: Seizure, ETOH/  COMPARISON: Head CT 09/05/2016.  TECHNIQUE: Routine CT Head without IV contrast. Multiplanar reformats. Dose reduction techniques were used.    FINDINGS:  INTRACRANIAL CONTENTS: No intracranial hemorrhage, extraaxial collection, or mass effect. No CT evidence of acute infarct. Normal parenchymal attenuation. Normal ventricles and sulci.     VISUALIZED ORBITS/SINUSES/MASTOIDS: No intraorbital abnormality. No paranasal sinus mucosal disease. No middle ear or mastoid effusion.    BONES/SOFT TISSUES: No acute abnormality.      Impression    IMPRESSION:  1.  Normal head CT.   XR Chest Port 1 View     Narrative    CHEST ONE VIEW  7/5/2023 8:27 AM     HISTORY: Hypoxemia.    COMPARISON: March 10, 2014      Impression    IMPRESSION: Low lung volumes. Question some minimal bibasilar  atelectasis vs. less likely infiltrate.     MILES SY MD         SYSTEM ID:  [id number]   XR Chest Port 1 View    Narrative    EXAM: XR CHEST PORT 1 VIEW  LOCATION: [facility]  DATE: 7/6/2023    INDICATION: febrile, eval for aspiration  COMPARISON: 07/05/2023      Impression    IMPRESSION: Stable mild bibasilar atelectasis with probable new tiny right pleural effusion.   XR Chest Port 1 View    Narrative    CHEST ONE VIEW  7/7/2023 7:40 AM     HISTORY: Abnormal breath sounds.    COMPARISON: July 6, 2023      Impression    IMPRESSION: Improved lung volumes. Perhaps a slight increase in  groundglass infiltrates in the right lung. Similar mild groundglass  changes and basilar atelectasis and/or infiltrate on the left.    MILES SY MD         SYSTEM ID:  [id number]   US Lower Extremity Venous Duplex Bilateral    Narrative    VENOUS ULTRASOUND BILATERAL LOWER EXTREMITIES July 10, 2023 10:09 AM     HISTORY: Patient with fever, request made for evaluation of deep vein  thrombosis.    COMPARISON: None.    TECHNIQUE: Color Doppler and spectral waveform analysis performed  throughout the deep veins of both lower extremities.    FINDINGS: Both common femoral, proximal greater saphenous, femoral,  and popliteal veins demonstrate normal blood flow, compression, and  augmentation. Posterior tibial and peroneal veins are compressible.      Impression    IMPRESSION: Negative for deep venous thrombosis throughout both lower  extremities.    TORREY MCKEON MD         SYSTEM ID:  [id number]   CT Head w/o Contrast    Narrative    CT SCAN OF THE HEAD WITHOUT CONTRAST   7/10/2023 11:34 AM     HISTORY: Ongoing agitation and confusion.    TECHNIQUE:  Axial images of the head and coronal reformations without  IV contrast material.  Radiation dose for this scan was reduced using  automated exposure control, adjustment of the mA and/or kV according  to patient size, or iterative reconstruction technique.    COMPARISON: None.    FINDINGS: No evidence of hemorrhage, mass, or hydrocephalus.  Parenchyma within normal limits for age. No acute osseous abnormality.  Air-fluid level within the sphenoid sinus.      Impression    IMPRESSION: No acute intracranial abnormality.         LEONA GARNETT MD         SYSTEM ID:  [id number]   CT Chest/Abdomen/Pelvis w Contrast    Narrative    CT CHEST/ABDOMEN/PELVIS WITH CONTRAST July 10, 2023 11:35 AM    CLINICAL HISTORY: Recurrent fevers, likely aspiration, known  pneumonia, diffuse abdominal pain.    TECHNIQUE: CT scan of the chest, abdomen, and pelvis was performed  following injection of IV contrast. Multiplanar reformats were  obtained. Dose reduction techniques were used.   CONTRAST:  86 mL Isovue 370.    COMPARISON: 7/4/2023.    FINDINGS:   LUNGS AND PLEURA: Small bilateral pleural effusions are new. Adjacent  opacity in both lower lobes may represent atelectasis although  superimposed pneumonitis possible.    MEDIASTINUM/AXILLAE: No lymphadenopathy. Right PICC line tip is in the  SVC. No coronary artery calcification.    HEPATOBILIARY: No bile duct dilation.    PANCREAS: Moderate to severe acute interstitial pancreatitis again  seen. The pancreas enhances normally. Increased inflammation and  ill-defined acute peripancreatic fluid collections in the  peripancreatic space and extending into the anterior pararenal spaces  bilaterally.    SPLEEN: Normal.    ADRENAL GLANDS: Normal.    KIDNEYS/BLADDER: Normal.    BOWEL: Normal.    PELVIC ORGANS: Normal.    ADDITIONAL FINDINGS: New short segment nonocclusive thrombus in the  superior mesenteric vein and proximal main portal vein. The splenic  vein is attenuated but patent.    MUSCULOSKELETAL: Normal.      Impression    IMPRESSION:  1.  Worsening acute  interstitial pancreatitis compared to 7/4/2023.  2.  New short segment thrombus in the superior mesenteric and proximal  portal vein secondary to #1.  3.  New small bilateral pleural effusions and bilateral lower lobe  opacities, atelectasis versus pneumonitis.    TIARA LIU MD         SYSTEM ID:  [id number]       Discharge Medications   Current Discharge Medication List      START taking these medications    Details   folic acid (FOLVITE) 1 MG tablet Take 1 tablet (1 mg) by mouth daily for 30 days  Qty: 30 tablet, Refills: 0    Associated Diagnoses: Alcohol use disorder, severe, in early remission (H)      pantoprazole (PROTONIX) 40 MG EC tablet Take 1 tablet (40 mg) by mouth 2 times daily (before meals)  Qty: 60 tablet, Refills: 0    Associated Diagnoses: Alcohol use disorder, severe, in early remission (H)      thiamine (B-1) 100 MG tablet Take 1 tablet (100 mg) by mouth daily for 30 days  Qty: 30 tablet, Refills: 0    Associated Diagnoses: Alcohol use disorder, severe, in early remission (H)         CONTINUE these medications which have CHANGED    Details   lamoTRIgine (LAMICTAL) 25 MG tablet Take 1 tablet (25 mg) by mouth daily  Qty: 30 tablet, Refills: 0    Comments: Refills per outpatient Psychiatrist.  Associated Diagnoses: Episode of recurrent major depressive disorder, unspecified depression episode severity (H)         CONTINUE these medications which have NOT CHANGED    Details   fluvoxaMINE (LUVOX) 100 MG tablet Take 200 mg by mouth At Bedtime      hydrOXYzine (ATARAX) 50 MG tablet Take 25-50 mg by mouth nightly as needed for other (sleep)      tadalafil (CIALIS) 20 MG tablet Take 20 mg by mouth daily as needed         STOP taking these medications       buPROPion (WELLBUTRIN XL) 300 MG 24 hr tablet Comments:   Reason for Stopping:         finasteride (PROPECIA) 1 MG tablet Comments:   Reason for Stopping:             Allergies   Allergies   Allergen Reactions     Erythromycin Rash     Penicillins  Rash

## 2023-07-14 NOTE — PROGRESS NOTES
.Discharge    Patient discharged to home via personal ride with spouse  Care plan note completed     Listed belongings gathered and given to patient (including from security/pharmacy). Yes  Care Plan and Patient education resolved: Yes  Prescriptions if needed, hard copies sent with patient  NA  Medication Bin checked and emptied on discharge Yes  SW/care coordinator/charge RN aware of discharge: Yes     AVS reviewed with pt.   Medications to be picked up at local pharmacy.  All possessions with pt.   Walked under own power with spouse to discharge.     Pt will check self into program at [facility] on Sunday afternoon.

## 2023-07-14 NOTE — PROGRESS NOTES
[facility]    Medicine Progress Note - Hospitalist Service    Date of Admission:  7/4/2023    Assessment & Plan   Dariusz Ferreira is a 42 year old male admitted on 7/4/2023.  He presents with alcohol withdrawal complicated by withdrawal seizure. Patient with prolonged ICU stay with high dose Precedex required due to agitation. Eventually given phenobarbital taper and had slow improvement in mentation.  At mental baseline 7/14 and feels ready for discharge, however, will monitor through 7/15 to ensure remains stable with transition off withdrawal/delirium meds back to his PTA psychiatric meds.        Alcohol withdrawal with alcohol withdrawal seizure, resolved  Alcohol use disorder, severe  Possible benzodiazipine induced delirium, resolved  Delirium tremens, resolved  * Longstanding hx alcohol use disorder. Has been to treatment 5+ times in the past. States binge drinks, has been drinking heavily last 2 weeks, stopped Saturday, withdrawal started Sunday and abdominal pain on admission. In ED tachy 100s, hypertensive, afebrile. WBC 16.5, hgb 19.1 (hemoconcentration). Etoh negative. Given phenobarbital 260 mg x 1 in ED. EKG ST.   * Did suffer a generalized tonic clonic seizure the day of admission, and transferred to the ICU with initiation of precedex infusion.  * prolonged delirium in ICU - stopped benzos (due to concern for contributing to delirium) and placed on phenobarb taper (completed 7/13), placed on valproic acid with gradual improvement  * psych consulted 7/10 but patient altered, see note regarding medication recs  * precedex weaned 7/13 and transferred out of ICU     - no longer scoring on CIWA, will discontinue  - back to baseline mental status and no prn's required past 24 hours, discontinue valproic acid  - prn haldol, zyprexa, seroquel for agitation  - continue thiamine, folate, MVI  - CD eval, interested in [facility] (would ideally enter treatment  07/14/23)     Fever, resolved  Presumed aspiration PNA  Acute hypoxemic respiratory failure, resolved  * CXR 7/7 shows increased infiltrates after febrile on 7/6, 7/7, 7/9, 7/10  * CT head 7/10 w/o acute findings  * CT CAP 7/10 with worsening pancreatitis with associated clot, lung infiltrates vs pneumonitis  * leg USG 7/10 without clot  * UA 7/10 non-infected  * treated with cefepime 7/6-7/10 when switched to levofloxacin due to concern for cefepime worsening AMS  * MRSA nasal swab negative so vanco stopped.   - continue levofloxacin with recurrent fever and leukocytosis on cefepime will plan to complete empiric course of levo (day 5/5)   - blood cultures 7/10 ngtd  - afebrile past 72 hours, WBC improving    Interstitial pancreatitis, alcohol induced  Thrombus of superior mesenteric and proximal portal vein  Transaminitis/ elevate bilirubin, resolved  Pancreatic tail lesion  Alcoholic gastritis   * With epigastric pain. Lipase 1083. AST/ /109. TB 1.5. acute LFT abnormalities 2/2 Etoh.    * CT with acute interstitial pancreatitis  - pantoprazole 40 mg BID  - no anticoagulation for clot  - if worsened abdominal pain would re-image abdomen   - plan outpatient EUS in 6 weeks to evaluate pancreatic tail lesion; [facility] signed off     Acute toxic metabolic encephalopathy, resolved  Due to medication for withdrawal and likely underlying DTs  - back to baseline mental status 07/14/23      Critical illness myopathy, resolving  - currently up independently     Dysphagia  * likely related to critical illness  - SLP cleared for reg diet, signed off     Hypokalemia  Hypophosphatemia  - replace per protocol     Mood disorder  [needs rec- bupropion 300 mg daily, fluvoxamine 200 mg at HS, lamotrigine 300 mg daily]  - do NOT resume buproprion when able to take PO, would hold until able to stay sober due to decreased seizure threshold  - resumed fluvoxamine 07/13/23   - will need to uptitrate lamotrigine (25 mg daily x2 weeks);  "initiate 7/15/23     Hx methamphetamine use disorder  Has had MSSA abscesses 2/2 IV meth use in the past. Denies meth use for several years.          Diet: Regular Diet Adult Thin Liquids (level 0) (Small bites/sips, sit at 90 degrees, stay up for 30-60 minutes after eating, alternate liquids and solids)    DVT Prophylaxis: Enoxaparin (Lovenox) SQ  Martin Catheter: Not present  Lines: PRESENT      PICC 07/09/23 Triple Lumen Right Basilic Ok for immediate use-Site Assessment: WDL      Cardiac Monitoring: None  Code Status: Full Code      Clinically Significant Risk Factors              # Hypoalbuminemia: Lowest albumin = 2.7 g/dL at 7/6/2023  4:56 AM, will monitor as appropriate            # Overweight: Estimated body mass index is 27.55 kg/m  as calculated from the following:    Height as of this encounter: 1.702 m (5' 7\").    Weight as of this encounter: 79.8 kg (175 lb 14.8 oz).           Disposition Plan      Expected Discharge Date: 07/15/2023, 12:00 PM    Destination: home with family;outpatient Tx          Lorenzo Beavers MD  Hospitalist Service  [facility]  Securely message with [employer] (more info)  Text page via [facility] Paging/Directory   ______________________________________________________________________    Interval History   Reports feeling very well.  Eager to enter treatment program.  Reports frequent loose stools, no abdominal pain or fever/chills.  No other complaints.     at bedside and reports patient is back to baseline mental status.     Physical Exam   Vital Signs: Temp: 98.4  F (36.9  C) Temp src: Oral BP: 135/89 Pulse: 102   Resp: 18 SpO2: 99 % O2 Device: None (Room air)    Weight: 175 lbs 14.83 oz    General Appearance: Well nourished male in NAD, sitting up in bed  Respiratory: lungs CTAB, no wheezes or crackles  Cardiovascular: RRR, normal s1/s2 without murmur  GI: normal bowel sounds, soft, nontender  Skin: no edema  Other: Alert and appropriate, cranial nerves " grossly intact     Medical Decision Making       35 MINUTES SPENT BY ME on the date of service doing chart review, history, exam, documentation & further activities per the note.  MANAGEMENT DISCUSSED with the following over the past 24 hours: bedside RN, patient's , social work   Medical complexity over the past 24 hours:  - Prescription DRUG MANAGEMENT performed      Data     I have personally reviewed the following data over the past 24 hrs:    11.3 (H)  \   11.0 (L)   / 704 (H)     137 103 2.7 (L) /  100 (H)   3.4 21 (L) 0.69 \

## 2023-07-14 NOTE — CONSULTS
7/14/2023    Met with pt via telephone to offer CD services. Pt reports he has been in contact with [facility] and does not need any additional referrals at this time. Pt is able to follow up with [facility] for his intake.    [facility]  Inpatient in [place]  Phone: [phone]  Fax: [phone]      Eloisa Burks [facility]  Phone: [phone]  Email: [email or web address]

## 2023-07-14 NOTE — PLAN OF CARE
Summary:  Alcohol withdrawal with seizure  DATE & TIME: 07/13/23 NOC  Cognitive Concerns/ Orientation : A&O x4   BEHAVIOR & AGGRESSION TOOL COLOR: : green  CIWA SCORE: 0,0  ABNL VS/O2: : VSS  MOBILITY: Ind in Rm  PAIN MANAGMENT: : Denies  DIET: : Reg, excellent appetite  BOWEL/BLADDER: Continent. no BM this shift, voided.  ABNL LAB/BG: K &Ph protocol: WNL. WBC.12.3  DRAIN/DEVICES: PICC right arm, patent, intermittent ABX  TELEMETRY RHYTHM: : NA, since coming up from ICU, no tele placed.  SKIN: : intact  TESTS/PROCEDURES:  NA  D/C DAY/GOALS/PLACE:  TBD  OTHER IMPORTANT INFO:  PT/OT evaluated pt today.  [facility] and pt have discussed plans for pt to transition to [facility] in [place] for CD.

## 2023-07-14 NOTE — PROGRESS NOTES
Care Management Discharge Note    Discharge Date: 07/15/2023       Discharge Disposition: Home, Outpatient Chemical Dependency, Outpatient Mental Health    Discharge Services: Chemical Dependency Resources, Mental Health Resources    Discharge DME: None    Discharge Transportation: family or friend will provide    Private pay costs discussed: Not applicable    Does the patient's insurance plan have a 3 day qualifying hospital stay waiver?  No    PAS Confirmation Code:  n/a  Patient/family educated on Medicare website which has current facility and service quality ratings: no    Education Provided on the Discharge Plan: yes   Persons Notified of Discharge Plans: patient and   Patient/Family in Agreement with the Plan: yes    Handoff Referral Completed: No    Additional Information:  Earlier today writer faxed clinical information to [facility] at [phone] as part of their assessment process with patient's permission. Patient is under the impression Elsa said that his PICC needs to be out five days before admission. Writer spoke with [facility] and she did not say this, she only said the PICC needs to be out.   Patient has been accepted for admission to [facility] Substance Use program on Sunday July 16 at 1000. Elsa at [phone] has coordinated this plan with patient. Elsa requests a copy of his discharge orders be faxed which writer will do.  Patient's  is in the room and aware of above information.          LAKESHIA WhittenSW

## 2023-07-14 NOTE — PLAN OF CARE
Physical Therapy Discharge Summary    Reason for therapy discharge:    Discharged to home.    Progress towards therapy goal(s). See goals on Care Plan in [facility] electronic health record for goal details.  Goals partially met.  Barriers to achieving goals:   discharge from facility.    Therapy recommendation(s):    Continue home exercise program.

## 2023-07-15 LAB
BACTERIA BLD CULT: NO GROWTH
BACTERIA BLD CULT: NO GROWTH

## 2023-07-17 NOTE — PLAN OF CARE
"Occupational Therapy Discharge Summary    Reason for therapy discharge:    Discharged to home.    Progress towards therapy goal(s). See goals on Care Plan in [facility] electronic health record for goal details.  Goals partially met.  Barriers to achieving goals:   discharge from facility.    Therapy recommendation(s):    Continued therapy is recommended.  Rationale/Recommendations:  Per evaluating therapist, \"Pt well below baseline, typically independent and now needing assist for all mobility and ADL tasks. Pt limited by dec cognition, balance deficits, impaired activity tolerance and weakness. Recommend TCU for continued IP OT to progress I/ADL independence prior to returning home.\" . Per chart review, pt discharge home with self check-in to chemical dependency program.      "

## 2023-07-18 ENCOUNTER — TELEPHONE (OUTPATIENT)
Dept: FAMILY MEDICINE | Facility: CLINIC | Age: 43
End: 2023-07-18
Payer: COMMERCIAL

## 2023-07-18 NOTE — TELEPHONE ENCOUNTER
Prior Authorization Retail Medication Request    Medication/Dose: pantoprazole (PROTONIX) 40 MG EC tablet  ICD code (if different than what is on RX):    Previously Tried and Failed:    Rationale:      Insurance Name:  na  Insurance ID:  [id number]      Pharmacy Information (if different than what is on RX)  Name:  same  Phone:  same

## 2023-07-24 NOTE — TELEPHONE ENCOUNTER
PRIOR AUTHORIZATION DENIED    Medication: pantoprazole (PROTONIX) 40 MG EC tablet    Denial Date: 7/24/2023    Denial Rational:              Appeal Information:    If you would like to appeal, please supply P/A team with a letter of medical necessity with clinical reason.

## 2023-07-24 NOTE — TELEPHONE ENCOUNTER
Central Prior Authorization Team   Phone: [phone]    PA Initiation    Medication: pantoprazole (PROTONIX) 40 MG EC tablet  Insurance Company: [employer] [place] - Phone [phone] Fax [phone]  Pharmacy Filling the Rx: [id number] [place], [place] - [address] AT [facility] [address]  Filling Pharmacy Phone: [phone]  Filling Pharmacy Fax:    Start Date: 7/24/2023

## 2023-08-29 ENCOUNTER — HOSPITAL ENCOUNTER (EMERGENCY)
Facility: CLINIC | Age: 43
Discharge: HOME | End: 2023-08-30
Attending: EMERGENCY MEDICINE | Admitting: EMERGENCY MEDICINE
Payer: COMMERCIAL

## 2023-08-29 ENCOUNTER — HOSPITAL ENCOUNTER (EMERGENCY)
Facility: CLINIC | Age: 43
Discharge: LEFT BEFORE DISCHARGE | End: 2023-08-29
Payer: COMMERCIAL

## 2023-08-29 VITALS
HEART RATE: 124 BPM | TEMPERATURE: 99.2 F | BODY MASS INDEX: 26.52 KG/M2 | OXYGEN SATURATION: 97 % | WEIGHT: 175 LBS | HEIGHT: 68 IN | RESPIRATION RATE: 18 BRPM | SYSTOLIC BLOOD PRESSURE: 135 MMHG | DIASTOLIC BLOOD PRESSURE: 97 MMHG

## 2023-08-29 DIAGNOSIS — F10.10 ALCOHOL ABUSE: ICD-10-CM

## 2023-08-29 DIAGNOSIS — E86.0 DEHYDRATION: ICD-10-CM

## 2023-08-29 LAB
ALBUMIN SERPL BCG-MCNC: 4.9 G/DL (ref 3.5–5.2)
ALP SERPL-CCNC: 92 U/L (ref 40–129)
ALT SERPL W P-5'-P-CCNC: 40 U/L (ref 0–70)
ANION GAP SERPL CALCULATED.3IONS-SCNC: 19 MMOL/L (ref 7–15)
AST SERPL W P-5'-P-CCNC: 25 U/L (ref 0–45)
BASOPHILS # BLD AUTO: 0 10E3/UL (ref 0–0.2)
BASOPHILS NFR BLD AUTO: 0 %
BILIRUB SERPL-MCNC: 0.3 MG/DL
BUN SERPL-MCNC: 10.1 MG/DL (ref 6–20)
CALCIUM SERPL-MCNC: 9.2 MG/DL (ref 8.6–10)
CHLORIDE SERPL-SCNC: 98 MMOL/L (ref 98–107)
CREAT SERPL-MCNC: 0.83 MG/DL (ref 0.67–1.17)
DEPRECATED HCO3 PLAS-SCNC: 22 MMOL/L (ref 22–29)
EOSINOPHIL # BLD AUTO: 0 10E3/UL (ref 0–0.7)
EOSINOPHIL NFR BLD AUTO: 0 %
ERYTHROCYTE [DISTWIDTH] IN BLOOD BY AUTOMATED COUNT: 13.2 % (ref 10–15)
ETHANOL SERPL-MCNC: 0.37 G/DL
GFR SERPL CREATININE-BSD FRML MDRD: >90 ML/MIN/1.73M2
GLUCOSE SERPL-MCNC: 117 MG/DL (ref 70–99)
HCT VFR BLD AUTO: 47.7 % (ref 40–53)
HGB BLD-MCNC: 16.4 G/DL (ref 13.3–17.7)
IMM GRANULOCYTES # BLD: 0 10E3/UL
IMM GRANULOCYTES NFR BLD: 0 %
LACTATE SERPL-SCNC: 5.1 MMOL/L (ref 0.7–2)
LIPASE SERPL-CCNC: 32 U/L (ref 13–60)
LYMPHOCYTES # BLD AUTO: 2.1 10E3/UL (ref 0.8–5.3)
LYMPHOCYTES NFR BLD AUTO: 40 %
MCH RBC QN AUTO: 29.2 PG (ref 26.5–33)
MCHC RBC AUTO-ENTMCNC: 34.4 G/DL (ref 31.5–36.5)
MCV RBC AUTO: 85 FL (ref 78–100)
MONOCYTES # BLD AUTO: 0.2 10E3/UL (ref 0–1.3)
MONOCYTES NFR BLD AUTO: 4 %
NEUTROPHILS # BLD AUTO: 3 10E3/UL (ref 1.6–8.3)
NEUTROPHILS NFR BLD AUTO: 56 %
NRBC # BLD AUTO: 0 10E3/UL
NRBC BLD AUTO-RTO: 0 /100
PLATELET # BLD AUTO: 443 10E3/UL (ref 150–450)
POTASSIUM SERPL-SCNC: 4.2 MMOL/L (ref 3.4–5.3)
PROT SERPL-MCNC: 7.9 G/DL (ref 6.4–8.3)
RBC # BLD AUTO: 5.61 10E6/UL (ref 4.4–5.9)
SODIUM SERPL-SCNC: 139 MMOL/L (ref 136–145)
WBC # BLD AUTO: 5.4 10E3/UL (ref 4–11)

## 2023-08-29 PROCEDURE — 250N000011 HC RX IP 250 OP 636: Mod: JZ | Performed by: EMERGENCY MEDICINE

## 2023-08-29 PROCEDURE — 83690 ASSAY OF LIPASE: CPT | Performed by: EMERGENCY MEDICINE

## 2023-08-29 PROCEDURE — 258N000003 HC RX IP 258 OP 636: Performed by: EMERGENCY MEDICINE

## 2023-08-29 PROCEDURE — 85025 COMPLETE CBC W/AUTO DIFF WBC: CPT | Performed by: EMERGENCY MEDICINE

## 2023-08-29 PROCEDURE — 82077 ASSAY SPEC XCP UR&BREATH IA: CPT | Performed by: EMERGENCY MEDICINE

## 2023-08-29 PROCEDURE — 250N000013 HC RX MED GY IP 250 OP 250 PS 637: Performed by: EMERGENCY MEDICINE

## 2023-08-29 PROCEDURE — 36415 COLL VENOUS BLD VENIPUNCTURE: CPT | Performed by: EMERGENCY MEDICINE

## 2023-08-29 PROCEDURE — 99284 EMERGENCY DEPT VISIT MOD MDM: CPT | Mod: 25

## 2023-08-29 PROCEDURE — 80053 COMPREHEN METABOLIC PANEL: CPT | Performed by: EMERGENCY MEDICINE

## 2023-08-29 PROCEDURE — 96374 THER/PROPH/DIAG INJ IV PUSH: CPT

## 2023-08-29 PROCEDURE — 83605 ASSAY OF LACTIC ACID: CPT | Performed by: EMERGENCY MEDICINE

## 2023-08-29 RX ORDER — MULTIVITAMIN,THERAPEUTIC
1 TABLET ORAL ONCE
Status: COMPLETED | OUTPATIENT
Start: 2023-08-29 | End: 2023-08-29

## 2023-08-29 RX ORDER — FOLIC ACID 1 MG/1
1 TABLET ORAL ONCE
Status: COMPLETED | OUTPATIENT
Start: 2023-08-29 | End: 2023-08-29

## 2023-08-29 RX ORDER — MAGNESIUM OXIDE 400 MG/1
800 TABLET ORAL ONCE
Status: COMPLETED | OUTPATIENT
Start: 2023-08-29 | End: 2023-08-29

## 2023-08-29 RX ORDER — ONDANSETRON 2 MG/ML
4 INJECTION INTRAMUSCULAR; INTRAVENOUS EVERY 30 MIN PRN
Status: DISCONTINUED | OUTPATIENT
Start: 2023-08-29 | End: 2023-08-30 | Stop reason: HOSPADM

## 2023-08-29 RX ADMIN — THIAMINE HCL TAB 100 MG 100 MG: 100 TAB at 22:50

## 2023-08-29 RX ADMIN — SODIUM CHLORIDE 1000 ML: 9 INJECTION, SOLUTION INTRAVENOUS at 22:41

## 2023-08-29 RX ADMIN — Medication 800 MG: at 22:50

## 2023-08-29 RX ADMIN — FOLIC ACID 1 MG: 1 TABLET ORAL at 22:50

## 2023-08-29 RX ADMIN — SODIUM CHLORIDE 1000 ML: 9 INJECTION, SOLUTION INTRAVENOUS at 23:30

## 2023-08-29 RX ADMIN — THERA TABS 1 TABLET: TAB at 22:50

## 2023-08-29 RX ADMIN — ONDANSETRON 4 MG: 2 INJECTION INTRAMUSCULAR; INTRAVENOUS at 22:49

## 2023-08-29 ASSESSMENT — ACTIVITIES OF DAILY LIVING (ADL): ADLS_ACUITY_SCORE: 35

## 2023-08-30 LAB — LACTATE SERPL-SCNC: 4.3 MMOL/L (ref 0.7–2)

## 2023-08-30 PROCEDURE — 36415 COLL VENOUS BLD VENIPUNCTURE: CPT | Performed by: EMERGENCY MEDICINE

## 2023-08-30 PROCEDURE — 83605 ASSAY OF LACTIC ACID: CPT | Performed by: EMERGENCY MEDICINE

## 2023-08-30 ASSESSMENT — ACTIVITIES OF DAILY LIVING (ADL)
ADLS_ACUITY_SCORE: 35
ADLS_ACUITY_SCORE: 35

## 2023-08-30 NOTE — ED NOTES
DATE/TIME OF CALL RECEIVED FROM LAB:  08/30/23 at 1:26 AM   LAB TEST:  Lactic  LAB VALUE:  4.3  PROVIDER NOTIFIED?: Yes  PROVIDER NAME: Kearney  DATE/TIME LAB VALUE REPORTED TO PROVIDER: 08/30/23 0125  MECHANISM OF PROVIDER NOTIFICATION: Face-To-Face  PROVIDER RESPONSE: No new orders

## 2023-08-30 NOTE — ED TRIAGE NOTES
"Patient arrives via EMS [facility] home where he called 911 himself reporting he is \"detoxing himself\" and is going through withdrawals. Upon further inquiry, patient reports last drink was just before he called 911, and he c/o feeling SOB. En route, elevated BP, HR. Dry heaves upon arrival. Hx of Dts during withdrawal. Was sober for a month, drinking consistently for the past 5 days. Recent visit for pancreatitis, admitted to ICU.    "

## 2023-08-30 NOTE — ED NOTES
Bed: ED19  Expected date:   Expected time:   Means of arrival:   Comments:  543 ETOH, hx of withdrawal. , /80

## 2023-08-30 NOTE — ED PROVIDER NOTES
"  History     Chief Complaint:  Withdrawal       HPI   Ashish Holloway is a 43 year old male with a history of alcohol abuse who presents with alcohol intoxication. He has been sober for 3-4 weeks before drinking alcohol for the past 5 days. Here, he is currently intoxicated. He has been vomiting frequently with associated abdominal pain and is concerned that he has pancreatitis.    Independent Historian:   None - Patient Only    Review of External Notes:   Not applicable    Medications:    Wellbutrin   Lamictal   Luvox   Gabapentin   Atarax   Cialis   Ativan      Past Medical History:    ADHD   Anxiety   Depressive disorder   HTN   Mild intermittent asthma   Alcohol use disorder   Tobacco use disorder   Insomnia   Alcohol withdrawal   Methamphetamine abuse   Esophageal reflux      Past Surgical History:    Tonsillectomy   Tongue mucous cyst excision   Unspecified L ankle surgery     Physical Exam   Patient Vitals for the past 24 hrs:   BP Temp Temp src Pulse Resp SpO2 Height Weight   08/29/23 2135 (!) 135/97 99.2  F (37.3  C) Oral (!) 124 18 97 % 1.727 m (5' 8\") 79.4 kg (175 lb)        Physical Exam  Vitals: reviewed by me  General: Pt seen on [facility], pleasant, cooperative, and alert to conversation, intoxicated pain however.  Dry mucous membranes  Eyes: Tracking well, clear conjunctiva BL  ENT: MMM, midline trachea.   Lungs: No tachypnea, no accessory muscle use. No respiratory distress.   CV: Rate as above  Abd: Soft, non tender, no guarding, no rebound. Non distended  MSK: no joint effusion.  No evidence of trauma  Skin: No rash  Neuro: Slurred speech and no facial droop.  No tremors, moving EXTR spontaneously  Psych: Not RIS, no e/o AH/VH      Emergency Department Course     Laboratory:  Labs Ordered and Resulted from Time of ED Arrival to Time of ED Departure   COMPREHENSIVE METABOLIC PANEL - Abnormal       Result Value    Sodium 139      Potassium 4.2      Chloride 98      Carbon Dioxide (CO2) 22      " Anion Gap 19 (*)     Urea Nitrogen 10.1      Creatinine 0.83      Calcium 9.2      Glucose 117 (*)     Alkaline Phosphatase 92      AST 25      ALT 40      Protein Total 7.9      Albumin 4.9      Bilirubin Total 0.3      GFR Estimate >90     LACTIC ACID WHOLE BLOOD - Abnormal    Lactic Acid 5.1 (*)    ETHYL ALCOHOL LEVEL - Abnormal    Alcohol ethyl 0.37 (*)    LACTIC ACID WHOLE BLOOD - Abnormal    Lactic Acid 4.3 (*)    LIPASE - Normal    Lipase 32     CBC WITH PLATELETS AND DIFFERENTIAL    WBC Count 5.4      RBC Count 5.61      Hemoglobin 16.4      Hematocrit 47.7      MCV 85      MCH 29.2      MCHC 34.4      RDW 13.2      Platelet Count 443      % Neutrophils 56      % Lymphocytes 40      % Monocytes 4      % Eosinophils 0      % Basophils 0      % Immature Granulocytes 0      NRBCs per 100 WBC 0      Absolute Neutrophils 3.0      Absolute Lymphocytes 2.1      Absolute Monocytes 0.2      Absolute Eosinophils 0.0      Absolute Basophils 0.0      Absolute Immature Granulocytes 0.0      Absolute NRBCs 0.0          Emergency Department Course & Assessments:    Interventions:  Medications   ondansetron (ZOFRAN) injection 4 mg (4 mg Intravenous $Given 8/29/23 2249)   0.9% sodium chloride BOLUS (1,000 mLs Intravenous $New Bag 8/29/23 2241)   multivitamin, therapeutic (THERA-VIT) tablet 1 tablet (1 tablet Oral $Given 8/29/23 2250)   folic acid (FOLVITE) tablet 1 mg (1 mg Oral $Given 8/29/23 2250)   thiamine (B-1) tablet 100 mg (100 mg Oral $Given 8/29/23 2250)   magnesium oxide (MAG-OX) tablet 800 mg (800 mg Oral $Given 8/29/23 2250)   0.9% sodium chloride BOLUS (1,000 mLs Intravenous $New Bag 8/29/23 2330)        Assessments:  2227 I obtained history and examined the patient, as noted above.    Independent Interpretation (X-rays, CTs, rhythm strip):  None    Consultations/Discussion of Management or Tests:  None        Social Determinants of Health affecting care:   Stress/Adjustment Disorders    Disposition:  The  patient was discharged to home.     Impression & Plan        Medical Decision Making:  This is a very pleasant 43-year-old male presents emergency room with significant alcohol intoxication and significant dehydration.  His alcohol level is quite high, his lactate was quite high as well, and I suspect this is some combination of dehydration, and liver damage from his alcohol use.  He has been drinking for about 4 to 5 days in a row and I do not see any evidence of alcohol withdrawal here and again his alcohol level is quite high.  He was watched here in the ER for almost 4 hours, and he was clearly becoming more sober.  He stated that he would prefer to leave AGAINST MEDICAL ADVICE and is able to get his  to come here who is completely sober to act as a chaperone.  He still is somewhat intoxicated, but again he has a sober chaperone and clearly is making a goal-oriented decision.  I told him that I was very concerned about his elevated lactate, and I wanted to give him medications to still go home with, though he seemed to be in quite a hurry once he is sober chaperone arrived, and left without any paperwork or prescriptions.  He did receive some multivitamins, and I did tell him specifically that I was concerned about his very elevated lactate and how it was not normal and he is willing to accept all risk of morbidity and mortality from under treatment as he is leaving without full treatment and with significantly abnormal vital signs and labs.  Nonetheless, he does seem to have capacity, and certainly is able to go with a sober chaperone at this time.    Critical Care time:  was 30 minutes for this patient excluding procedures.    Diagnosis:    ICD-10-CM    1. Alcohol abuse  F10.10       2. Dehydration  E86.0            Discharge Medications:  There are no discharge medications for this patient.     Scribe Disclosure:  I, Wu Paris, am serving as a scribe at 3:45 AM on 8/30/2023 to document services  personally performed by Mik Kearney MD based on my observations and the provider's statements to me.        Mik Kearney MD  08/30/23 2124

## 2023-10-27 ENCOUNTER — ANCILLARY PROCEDURE (OUTPATIENT)
Dept: MRI IMAGING | Facility: CLINIC | Age: 43
End: 2023-10-27
Attending: INTERNAL MEDICINE
Payer: MEDICAID

## 2023-10-27 DIAGNOSIS — K85.20 ALCOHOL-INDUCED ACUTE PANCREATITIS, UNSPECIFIED COMPLICATION STATUS: ICD-10-CM

## 2023-10-27 PROCEDURE — 74183 MRI ABD W/O CNTR FLWD CNTR: CPT

## 2023-10-27 PROCEDURE — 255N000002 HC RX 255 OP 636: Mod: JZ | Performed by: INTERNAL MEDICINE

## 2023-10-27 PROCEDURE — A9585 GADOBUTROL INJECTION: HCPCS | Mod: JZ | Performed by: INTERNAL MEDICINE

## 2023-10-27 RX ORDER — GADOBUTROL 604.72 MG/ML
8.5 INJECTION INTRAVENOUS ONCE
Status: COMPLETED | OUTPATIENT
Start: 2023-10-27 | End: 2023-10-27

## 2023-10-27 RX ADMIN — GADOBUTROL 8.5 ML: 604.72 INJECTION INTRAVENOUS at 06:53

## 2023-10-31 ENCOUNTER — E-VISIT (OUTPATIENT)
Dept: URGENT CARE | Facility: CLINIC | Age: 43
End: 2023-10-31
Payer: MEDICAID

## 2023-10-31 DIAGNOSIS — R21 RASH AND NONSPECIFIC SKIN ERUPTION: Primary | ICD-10-CM

## 2023-10-31 PROCEDURE — 99207 PR NON-BILLABLE SERV PER CHARTING: CPT | Performed by: EMERGENCY MEDICINE

## 2023-10-31 RX ORDER — BETAMETHASONE DIPROPIONATE 0.5 MG/G
CREAM TOPICAL 2 TIMES DAILY
Qty: 45 G | Refills: 1 | Status: SHIPPED | OUTPATIENT
Start: 2023-10-31 | End: 2023-10-31

## 2023-10-31 RX ORDER — BETAMETHASONE DIPROPIONATE 0.5 MG/G
OINTMENT, AUGMENTED TOPICAL 2 TIMES DAILY
Qty: 45 G | Refills: 1 | Status: SHIPPED | OUTPATIENT
Start: 2023-10-31

## 2023-11-26 ENCOUNTER — E-VISIT (OUTPATIENT)
Dept: URGENT CARE | Facility: CLINIC | Age: 43
End: 2023-11-26
Payer: MEDICAID

## 2023-11-26 DIAGNOSIS — H10.32 ACUTE BACTERIAL CONJUNCTIVITIS OF LEFT EYE: Primary | ICD-10-CM

## 2023-11-26 PROCEDURE — 99207 PR NON-BILLABLE SERV PER CHARTING: CPT | Performed by: NURSE PRACTITIONER

## 2023-11-26 RX ORDER — POLYMYXIN B SULFATE AND TRIMETHOPRIM 1; 10000 MG/ML; [USP'U]/ML
SOLUTION OPHTHALMIC
Qty: 10 ML | Refills: 0 | Status: SHIPPED | OUTPATIENT
Start: 2023-11-26 | End: 2023-12-03

## 2023-11-26 NOTE — PATIENT INSTRUCTIONS
Thank you for choosing us for your care. I have placed an order for a prescription so that you can start treatment. View your full visit summary for details by clicking on the link below. Your pharmacist will able to address any questions you may have about the medication.     If you re not feeling better within 2-3 days, please schedule an appointment.  You can schedule an appointment right here in [facility], or call [phone]  If the visit is for the same symptoms as your eVisit, we ll refund the cost of your eVisit if seen within seven days.    Pinkeye: Care Instructions  Overview     Pinkeye is redness and swelling of the eye surface and the conjunctiva (the lining of the eyelid and the covering of the white part of the eye). Pinkeye is also called conjunctivitis. Pinkeye is often caused by infection with bacteria or a virus. Dry air, allergies, smoke, and chemicals are other common causes.  Pinkeye often gets better on its own in 7 to 10 days. Antibiotics only help if the pinkeye is caused by bacteria. Pinkeye caused by infection spreads easily. If an allergy or chemical is causing pinkeye, it will not go away unless you can avoid whatever is causing it.  Follow-up care is a key part of your treatment and safety. Be sure to make and go to all appointments, and call your doctor if you are having problems. It's also a good idea to know your test results and keep a list of the medicines you take.  How can you care for yourself at home?  Wash your hands often. Always wash them before and after you treat pinkeye or touch your eyes or face.  Use moist cotton or a clean, wet cloth to remove crust. Wipe from the inside corner of the eye to the outside. Use a clean part of the cloth for each wipe.  Put cold or warm wet cloths on your eye a few times a day if the eye hurts.  Do not wear contact lenses or eye makeup until the pinkeye is gone. Throw away any eye makeup you were using when you got pinkeye. Clean your  "contacts and storage case. If you wear disposable contacts, use a new pair when your eye has cleared and it is safe to wear contacts again.  If the doctor gave you antibiotic ointment or eyedrops, use them as directed. Use the medicine for as long as instructed, even if your eye starts looking better soon. Keep the bottle tip clean, and do not let it touch the eye area.  To put in eyedrops or ointment:  Tilt your head back, and pull your lower eyelid down with one finger.  Drop or squirt the medicine inside the lower lid.  Close your eye for 30 to 60 seconds to let the drops or ointment move around.  Do not touch the ointment or dropper tip to your eyelashes or any other surface.  Do not share towels, pillows, or washcloths while you have pinkeye.  When should you call for help?   Call your doctor now or seek immediate medical care if:    You have pain in your eye, not just irritation on the surface.     You have a change in vision or loss of vision.     You have an increase in discharge from the eye.     Your eye has not started to improve or begins to get worse within 48 hours after you start using antibiotics.     Pinkeye lasts longer than 7 days.   Watch closely for changes in your health, and be sure to contact your doctor if you have any problems.  Where can you learn more?  Go to https://www.[employer].net/patiented  Enter Y392 in the search box to learn more about \"Pinkeye: Care Instructions.\"  Current as of: June 6, 2023               Content Version: 13.8    3385-7205 [employer].   Care instructions adapted under license by your healthcare professional. If you have questions about a medical condition or this instruction, always ask your healthcare professional. [employer] disclaims any warranty or liability for your use of this information.      "

## 2023-11-29 ENCOUNTER — E-VISIT (OUTPATIENT)
Dept: URGENT CARE | Facility: CLINIC | Age: 43
End: 2023-11-29
Payer: MEDICAID

## 2023-11-29 DIAGNOSIS — B00.1 HERPES LABIALIS: Primary | ICD-10-CM

## 2023-11-29 PROCEDURE — 99207 PR NON-BILLABLE SERV PER CHARTING: CPT | Performed by: EMERGENCY MEDICINE

## 2023-11-29 RX ORDER — VALACYCLOVIR HYDROCHLORIDE 1 G/1
2000 TABLET, FILM COATED ORAL 2 TIMES DAILY
Qty: 12 TABLET | Refills: 1 | Status: SHIPPED | OUTPATIENT
Start: 2023-11-29 | End: 2023-11-30

## 2024-01-29 NOTE — PROGRESS NOTES
Anesthesia Start and Stop Event Times       Date Time Event    1/29/2024 0920 Ready for Procedure     0938 Anesthesia Start     1132 Anesthesia Stop          Responsible Staff  01/29/24      Name Role Begin End    Ash Christie M.D. Anesth 0938 1132          Overtime Reason:  no overtime (within assigned shift)    Comments:                                                           [facility]    Medicine Progress Note - Hospitalist Service    Date of Admission:  7/4/2023    Assessment & Plan    Dariusz Ferreira is a 42 year old male admitted on 7/4/2023. He presents with alcohol withdrawal complicated by withdrawal seizure.    Alcohol withdrawal with alcohol withdrawal seizure  Alcohol use disorder  Longstanding hx alcohol use disorder. Has been to treatment 5+ times in the past. States binge drinks, has been drinking heavily last 2 weeks, stopped Saturday, withdrawal started Sunday and abdominal pain today. In ED tachy 100s, hypertensive, afebrile. WBC 16.5, hgb 19.1 (hemoconcentration). Etoh negative. Given phenobarbital 260 mg x 1 in ED. EKG ST.   Did suffer a generalized tonic clonic seizure the day of admission, and transferred to the ICU with initiation of precedex infusion.  * second dose of phenobarb 130mg ordered 7/6  plan  - CIWA, will STOP further benzo use due to concern for benzo-induced delirium  - continue precedex  - continue haldol, zyprexa, seroquel as needed for agitation  - vitamins including IV thiamine (wernickes panel)  - will need chemical dependence eval, but reportedly declined immediately at admission  - phenobarb taper started 7/8 due to ongoing severe withdrawal  - consult psychiatry    Acute hypoxemic respiratory failure, improving  Likely secondary to sedation, but we will evaluate for signs of developing pneumonia. Overall SIRS criteria are improving  Plan  - aspiration precautions  - PRN lasix for wheeze or signs of fluid overload. Dose again on 7/8.    Fever  Presumed aspiration PNA  * CXR 7/7 shows increased infiltrates after febrile on 7/6 and 7/7.  - started cefepime for presume aspiration or hospital acquired PNA  - MRSA nasal swab negative so vanco stopped.     Toxic metabolic encephalopathy  Due to medication for withdrawal and likely underlying DTs  Plan  - wean sedatives as tolerates     Interstitial pancreatitis, alcohol  induced  Thrombus of superior mesenteric and proximal portal vein  Transaminitis/ elevate bilirubin  Pancreatic tail lesion  Alcoholic gastritis  With epigastric pain. Lipase 1083. AST/ /109. TB 1.5. acute LFT abnormalities 2/2 Etoh.   CT with acute interstitial pancreatitis  - SIRS criteria improving and no overt organ failure yet, probably would characterize as moderate pancreatitis. ICU needed primarily for alcohol withdrawal   - May start diet as soon as mentation improved.   - IV fluids to remain at 100  - pantoprazole 40 mg IV BID  - prn analgesics, antiemetics  - will need follow-up imaging of the tail lesion, will need to discuss with patient when able  - will place NG for TF on 7/11 if no improvement in oral intake  - appreciate GI consultation   - no anticoagulation for clot  - if worsened abdominal pain would re-image abdomen   - plan outpatient EUS in 6 weeks to evaluate pancreatic tail lesion     Hypokalemia  Hypophosphatemia  - replace per protocol     Mood disorder  [needs rec- bupropion 300 mg daily, fluvoxamine 200 mg at HS, lamotrigine 300 mg daily]  - resume meds when able to take PO     Hx methamphetamine use disorder  Has had MSSA abscesses 2/2 IV meth use in the past. Denies meth use for several years.           Diet: Regular Diet Adult    DVT Prophylaxis: Enoxaparin (Lovenox) SQ and Pneumatic Compression Devices  Martin Catheter: Not present  Lines: PRESENT      PICC 07/09/23 Triple Lumen Right Basilic Ok for immediate use-Site Assessment: WDL      Cardiac Monitoring: ACTIVE order. Indication: ICU  Code Status: Full Code      Clinically Significant Risk Factors        # Hypokalemia: Lowest K = 3.2 mmol/L in last 2 days, will replace as needed       # Hypoalbuminemia: Lowest albumin = 2.7 g/dL at 7/6/2023  4:56 AM, will monitor as appropriate            # Overweight: Estimated body mass index is 27.21 kg/m  as calculated from the following:    Height as of this encounter: 1.702 m (5'  "7\").    Weight as of this encounter: 78.8 kg (173 lb 11.6 oz).           Disposition Plan     Expected Discharge Date: 07/12/2023                   30 minutes of critical care, management of precedex, dosing phenobarb, management of severe etoh withdrawal.    Wilbur Gatica MD  Hospitalist Service  [facility]  Securely message with [employer]el[employer] (more info)  Text page via [employer] Paging/Directory   ______________________________________________________________________    Interval History   Agitated overnight, again. Afebrile. Still maxed on precedex. Able to answer a few questions for me. Question if ongoing delirium related to benzo delirium at this point. Will stop benzos. His prior doses and phenobarb should taper off safely. Consulting psych and GI due to delirium and CT findings.     Patient this AM was able to answer some simple questions.     Discussed with RN, sit, ICU MD, ICU pharmD.     Physical Exam   Vital Signs: Temp: 99.3  F (37.4  C) Temp src: Axillary BP: 117/81 Pulse: 70   Resp: 21 SpO2: 96 % O2 Device: None (Room air) Oxygen Delivery: 1 LPM  Weight: 173 lbs 11.56 oz    General Appearance:  somnolent, awakens to touch. moves all extremities  Respiratory: crackles in base. Wheeze.  Cardiovascular: regular  no murmur. No edema  GI: distended, tender to light touch. No rebound or guarding  Skin: no rashes or lesions grossly    Other:  CN grossly intact, RAMIREZ       Medical Decision Making       30 MINUTES SPENT BY ME on the date of service doing chart review, history, exam, documentation & further activities per the note.    30 minutes of critical care, management of precedex, dosing IV benzos, dosing IV valproate, phenobarb taper, management of acutely ill ICU patient, management of severe etoh withdrawal.    Data     I have personally reviewed the following data over the past 24 hrs:    14.9 (H)  \   10.9 (L)   / 350     137 101 3.8 (L) /  91   4.1 24 0.67 \       Imaging results " reviewed over the past 24 hrs:   Recent Results (from the past 24 hour(s))   US Lower Extremity Venous Duplex Bilateral    Narrative    VENOUS ULTRASOUND BILATERAL LOWER EXTREMITIES July 10, 2023 10:09 AM     HISTORY: Patient with fever, request made for evaluation of deep vein  thrombosis.    COMPARISON: None.    TECHNIQUE: Color Doppler and spectral waveform analysis performed  throughout the deep veins of both lower extremities.    FINDINGS: Both common femoral, proximal greater saphenous, femoral,  and popliteal veins demonstrate normal blood flow, compression, and  augmentation. Posterior tibial and peroneal veins are compressible.      Impression    IMPRESSION: Negative for deep venous thrombosis throughout both lower  extremities.    TORREY MCKEON MD         SYSTEM ID:  [id number]   CT Head w/o Contrast    Narrative    CT SCAN OF THE HEAD WITHOUT CONTRAST   7/10/2023 11:34 AM     HISTORY: Ongoing agitation and confusion.    TECHNIQUE:  Axial images of the head and coronal reformations without  IV contrast material. Radiation dose for this scan was reduced using  automated exposure control, adjustment of the mA and/or kV according  to patient size, or iterative reconstruction technique.    COMPARISON: None.    FINDINGS: No evidence of hemorrhage, mass, or hydrocephalus.  Parenchyma within normal limits for age. No acute osseous abnormality.  Air-fluid level within the sphenoid sinus.      Impression    IMPRESSION: No acute intracranial abnormality.         LEONA GARNETT MD         SYSTEM ID:  [id number]   CT Chest/Abdomen/Pelvis w Contrast    Narrative    CT CHEST/ABDOMEN/PELVIS WITH CONTRAST July 10, 2023 11:35 AM    CLINICAL HISTORY: Recurrent fevers, likely aspiration, known  pneumonia, diffuse abdominal pain.    TECHNIQUE: CT scan of the chest, abdomen, and pelvis was performed  following injection of IV contrast. Multiplanar reformats were  obtained. Dose reduction techniques were used.   CONTRAST:  86 mL  Isovue 370.    COMPARISON: 7/4/2023.    FINDINGS:   LUNGS AND PLEURA: Small bilateral pleural effusions are new. Adjacent  opacity in both lower lobes may represent atelectasis although  superimposed pneumonitis possible.    MEDIASTINUM/AXILLAE: No lymphadenopathy. Right PICC line tip is in the  SVC. No coronary artery calcification.    HEPATOBILIARY: No bile duct dilation.    PANCREAS: Moderate to severe acute interstitial pancreatitis again  seen. The pancreas enhances normally. Increased inflammation and  ill-defined acute peripancreatic fluid collections in the  peripancreatic space and extending into the anterior pararenal spaces  bilaterally.    SPLEEN: Normal.    ADRENAL GLANDS: Normal.    KIDNEYS/BLADDER: Normal.    BOWEL: Normal.    PELVIC ORGANS: Normal.    ADDITIONAL FINDINGS: New short segment nonocclusive thrombus in the  superior mesenteric vein and proximal main portal vein. The splenic  vein is attenuated but patent.    MUSCULOSKELETAL: Normal.      Impression    IMPRESSION:  1.  Worsening acute interstitial pancreatitis compared to 7/4/2023.  2.  New short segment thrombus in the superior mesenteric and proximal  portal vein secondary to #1.  3.  New small bilateral pleural effusions and bilateral lower lobe  opacities, atelectasis versus pneumonitis.

## 2024-06-01 ENCOUNTER — HEALTH MAINTENANCE LETTER (OUTPATIENT)
Age: 44
End: 2024-06-01

## 2024-12-24 NOTE — TELEPHONE ENCOUNTER
Valerie below from patient. Routing to provider to review. Natrexone injection last on 08/22/17. Has PO Natrexone 50mg tabs on file with refills.     Hi Dr Barfield,     I stopped receiving the shot and have not taken Naltrexone for nearly 2 months now.  I felt the shot only lasted 2 weeks and was a very ineffective way to medicate myself.     I want to talk about pill form if I take pill form do I need to ramp up again?  Or can I just begin with the 50mg tablets I have?         Dede Paul  BSN-RN Care Coordinator  [facility]     SUICIDE/SELF-INJURIOUS BEHAVIOR

## 2025-03-25 ENCOUNTER — HOSPITAL ENCOUNTER (EMERGENCY)
Facility: CLINIC | Age: 45
Discharge: HOME | End: 2025-03-25
Attending: EMERGENCY MEDICINE

## 2025-03-25 VITALS
SYSTOLIC BLOOD PRESSURE: 136 MMHG | DIASTOLIC BLOOD PRESSURE: 88 MMHG | WEIGHT: 159.61 LBS | BODY MASS INDEX: 24.19 KG/M2 | RESPIRATION RATE: 20 BRPM | HEIGHT: 68 IN | TEMPERATURE: 98.3 F | OXYGEN SATURATION: 99 % | HEART RATE: 102 BPM

## 2025-03-25 DIAGNOSIS — F10.930 ALCOHOL WITHDRAWAL SYNDROME WITHOUT COMPLICATION (H): ICD-10-CM

## 2025-03-25 DIAGNOSIS — K29.20 ACUTE ALCOHOLIC GASTRITIS WITHOUT HEMORRHAGE: ICD-10-CM

## 2025-03-25 DIAGNOSIS — F10.10 ALCOHOL ABUSE: ICD-10-CM

## 2025-03-25 LAB
ABO + RH BLD: NORMAL
ALBUMIN SERPL BCG-MCNC: 5.1 G/DL (ref 3.5–5.2)
ALP SERPL-CCNC: 81 U/L (ref 40–150)
ALT SERPL W P-5'-P-CCNC: 35 U/L (ref 0–70)
ANION GAP SERPL CALCULATED.3IONS-SCNC: 21 MMOL/L (ref 7–15)
AST SERPL W P-5'-P-CCNC: 30 U/L (ref 0–45)
ATRIAL RATE - MUSE: 119 BPM
BASOPHILS # BLD AUTO: 0 10E3/UL (ref 0–0.2)
BASOPHILS NFR BLD AUTO: 0 %
BILIRUB SERPL-MCNC: 1 MG/DL
BLD GP AB SCN SERPL QL: NEGATIVE
BUN SERPL-MCNC: 9.4 MG/DL (ref 6–20)
CALCIUM SERPL-MCNC: 10.3 MG/DL (ref 8.8–10.4)
CHLORIDE SERPL-SCNC: 91 MMOL/L (ref 98–107)
CREAT SERPL-MCNC: 0.83 MG/DL (ref 0.67–1.17)
DIASTOLIC BLOOD PRESSURE - MUSE: NORMAL MMHG
EGFRCR SERPLBLD CKD-EPI 2021: >90 ML/MIN/1.73M2
EOSINOPHIL # BLD AUTO: 0 10E3/UL (ref 0–0.7)
EOSINOPHIL NFR BLD AUTO: 0 %
ERYTHROCYTE [DISTWIDTH] IN BLOOD BY AUTOMATED COUNT: 13.6 % (ref 10–15)
ETHANOL SERPL-MCNC: <0.01 G/DL
GLUCOSE SERPL-MCNC: 112 MG/DL (ref 70–99)
HCO3 SERPL-SCNC: 22 MMOL/L (ref 22–29)
HCT VFR BLD AUTO: 48.5 % (ref 40–53)
HGB BLD-MCNC: 16.7 G/DL (ref 13.3–17.7)
HOLD SPECIMEN: NORMAL
IMM GRANULOCYTES # BLD: 0.1 10E3/UL
IMM GRANULOCYTES NFR BLD: 1 %
INR PPP: 0.91 (ref 0.85–1.15)
INTERPRETATION ECG - MUSE: NORMAL
LACTATE SERPL-SCNC: 1.3 MMOL/L (ref 0.7–2)
LIPASE SERPL-CCNC: 243 U/L (ref 13–60)
LYMPHOCYTES # BLD AUTO: 0.8 10E3/UL (ref 0.8–5.3)
LYMPHOCYTES NFR BLD AUTO: 9 %
MAGNESIUM SERPL-MCNC: 2.3 MG/DL (ref 1.7–2.3)
MCH RBC QN AUTO: 30 PG (ref 26.5–33)
MCHC RBC AUTO-ENTMCNC: 34.4 G/DL (ref 31.5–36.5)
MCV RBC AUTO: 87 FL (ref 78–100)
MONOCYTES # BLD AUTO: 0.8 10E3/UL (ref 0–1.3)
MONOCYTES NFR BLD AUTO: 9 %
NEUTROPHILS # BLD AUTO: 7.9 10E3/UL (ref 1.6–8.3)
NEUTROPHILS NFR BLD AUTO: 82 %
NRBC # BLD AUTO: 0 10E3/UL
NRBC BLD AUTO-RTO: 0 /100
P AXIS - MUSE: 60 DEGREES
PHOSPHATE SERPL-MCNC: 1.6 MG/DL (ref 2.5–4.5)
PLATELET # BLD AUTO: 446 10E3/UL (ref 150–450)
POTASSIUM SERPL-SCNC: 3.7 MMOL/L (ref 3.4–5.3)
PR INTERVAL - MUSE: 144 MS
PROT SERPL-MCNC: 8.4 G/DL (ref 6.4–8.3)
QRS DURATION - MUSE: 76 MS
QT - MUSE: 318 MS
QTC - MUSE: 447 MS
R AXIS - MUSE: 39 DEGREES
RBC # BLD AUTO: 5.57 10E6/UL (ref 4.4–5.9)
SODIUM SERPL-SCNC: 134 MMOL/L (ref 135–145)
SPECIMEN EXP DATE BLD: NORMAL
SYSTOLIC BLOOD PRESSURE - MUSE: NORMAL MMHG
T AXIS - MUSE: 82 DEGREES
VENTRICULAR RATE- MUSE: 119 BPM
WBC # BLD AUTO: 9.7 10E3/UL (ref 4–11)

## 2025-03-25 PROCEDURE — 258N000003 HC RX IP 258 OP 636: Performed by: EMERGENCY MEDICINE

## 2025-03-25 PROCEDURE — 83690 ASSAY OF LIPASE: CPT | Performed by: EMERGENCY MEDICINE

## 2025-03-25 PROCEDURE — 85014 HEMATOCRIT: CPT | Performed by: EMERGENCY MEDICINE

## 2025-03-25 PROCEDURE — 36415 COLL VENOUS BLD VENIPUNCTURE: CPT | Performed by: EMERGENCY MEDICINE

## 2025-03-25 PROCEDURE — 86850 RBC ANTIBODY SCREEN: CPT | Performed by: EMERGENCY MEDICINE

## 2025-03-25 PROCEDURE — 99284 EMERGENCY DEPT VISIT MOD MDM: CPT | Mod: 25 | Performed by: EMERGENCY MEDICINE

## 2025-03-25 PROCEDURE — 83605 ASSAY OF LACTIC ACID: CPT | Performed by: EMERGENCY MEDICINE

## 2025-03-25 PROCEDURE — 85004 AUTOMATED DIFF WBC COUNT: CPT | Performed by: EMERGENCY MEDICINE

## 2025-03-25 PROCEDURE — 250N000011 HC RX IP 250 OP 636: Performed by: EMERGENCY MEDICINE

## 2025-03-25 PROCEDURE — 96375 TX/PRO/DX INJ NEW DRUG ADDON: CPT | Performed by: EMERGENCY MEDICINE

## 2025-03-25 PROCEDURE — 96374 THER/PROPH/DIAG INJ IV PUSH: CPT | Performed by: EMERGENCY MEDICINE

## 2025-03-25 PROCEDURE — 93005 ELECTROCARDIOGRAM TRACING: CPT | Performed by: EMERGENCY MEDICINE

## 2025-03-25 PROCEDURE — 96361 HYDRATE IV INFUSION ADD-ON: CPT | Performed by: EMERGENCY MEDICINE

## 2025-03-25 PROCEDURE — 250N000013 HC RX MED GY IP 250 OP 250 PS 637: Performed by: EMERGENCY MEDICINE

## 2025-03-25 PROCEDURE — 80053 COMPREHEN METABOLIC PANEL: CPT | Performed by: EMERGENCY MEDICINE

## 2025-03-25 PROCEDURE — 83735 ASSAY OF MAGNESIUM: CPT | Performed by: EMERGENCY MEDICINE

## 2025-03-25 PROCEDURE — 82077 ASSAY SPEC XCP UR&BREATH IA: CPT | Performed by: EMERGENCY MEDICINE

## 2025-03-25 PROCEDURE — 96376 TX/PRO/DX INJ SAME DRUG ADON: CPT | Performed by: EMERGENCY MEDICINE

## 2025-03-25 PROCEDURE — 250N000009 HC RX 250: Performed by: EMERGENCY MEDICINE

## 2025-03-25 PROCEDURE — 84100 ASSAY OF PHOSPHORUS: CPT | Performed by: EMERGENCY MEDICINE

## 2025-03-25 PROCEDURE — 86900 BLOOD TYPING SEROLOGIC ABO: CPT | Performed by: EMERGENCY MEDICINE

## 2025-03-25 PROCEDURE — 85610 PROTHROMBIN TIME: CPT | Performed by: EMERGENCY MEDICINE

## 2025-03-25 RX ORDER — ONDANSETRON 4 MG/1
4 TABLET, ORALLY DISINTEGRATING ORAL EVERY 8 HOURS PRN
Qty: 15 TABLET | Refills: 0 | Status: SHIPPED | OUTPATIENT
Start: 2025-03-25

## 2025-03-25 RX ORDER — LORAZEPAM 2 MG/ML
0.5 INJECTION INTRAMUSCULAR EVERY 10 MIN PRN
Status: DISCONTINUED | OUTPATIENT
Start: 2025-03-25 | End: 2025-03-25 | Stop reason: HOSPADM

## 2025-03-25 RX ORDER — LORAZEPAM 2 MG/ML
1 INJECTION INTRAMUSCULAR ONCE
Status: COMPLETED | OUTPATIENT
Start: 2025-03-25 | End: 2025-03-25

## 2025-03-25 RX ORDER — LIDOCAINE HYDROCHLORIDE 20 MG/ML
10 SOLUTION OROPHARYNGEAL ONCE
Status: COMPLETED | OUTPATIENT
Start: 2025-03-25 | End: 2025-03-25

## 2025-03-25 RX ORDER — DIAZEPAM 5 MG/1
10 TABLET ORAL ONCE
Status: COMPLETED | OUTPATIENT
Start: 2025-03-25 | End: 2025-03-25

## 2025-03-25 RX ORDER — ONDANSETRON 2 MG/ML
4 INJECTION INTRAMUSCULAR; INTRAVENOUS ONCE
Status: COMPLETED | OUTPATIENT
Start: 2025-03-25 | End: 2025-03-25

## 2025-03-25 RX ORDER — MAGNESIUM HYDROXIDE/ALUMINUM HYDROXICE/SIMETHICONE 120; 1200; 1200 MG/30ML; MG/30ML; MG/30ML
30 SUSPENSION ORAL ONCE
Status: COMPLETED | OUTPATIENT
Start: 2025-03-25 | End: 2025-03-25

## 2025-03-25 RX ADMIN — ONDANSETRON 4 MG: 2 INJECTION, SOLUTION INTRAMUSCULAR; INTRAVENOUS at 06:37

## 2025-03-25 RX ADMIN — SODIUM CHLORIDE 1000 ML: 9 INJECTION, SOLUTION INTRAVENOUS at 06:37

## 2025-03-25 RX ADMIN — DIAZEPAM 10 MG: 5 TABLET ORAL at 11:55

## 2025-03-25 RX ADMIN — LORAZEPAM 1 MG: 2 INJECTION INTRAMUSCULAR; INTRAVENOUS at 06:36

## 2025-03-25 RX ADMIN — ALUMINUM HYDROXIDE, MAGNESIUM HYDROXIDE, AND SIMETHICONE 30 ML: 200; 200; 20 SUSPENSION ORAL at 08:34

## 2025-03-25 RX ADMIN — LIDOCAINE HYDROCHLORIDE 10 ML: 20 SOLUTION ORAL at 08:34

## 2025-03-25 RX ADMIN — SODIUM CHLORIDE 1000 ML: 0.9 INJECTION, SOLUTION INTRAVENOUS at 06:36

## 2025-03-25 RX ADMIN — LORAZEPAM 0.5 MG: 2 INJECTION INTRAMUSCULAR; INTRAVENOUS at 08:34

## 2025-03-25 ASSESSMENT — ACTIVITIES OF DAILY LIVING (ADL)
ADLS_ACUITY_SCORE: 58

## 2025-03-25 ASSESSMENT — COLUMBIA-SUICIDE SEVERITY RATING SCALE - C-SSRS
6. HAVE YOU EVER DONE ANYTHING, STARTED TO DO ANYTHING, OR PREPARED TO DO ANYTHING TO END YOUR LIFE?: NO
1. IN THE PAST MONTH, HAVE YOU WISHED YOU WERE DEAD OR WISHED YOU COULD GO TO SLEEP AND NOT WAKE UP?: NO
2. HAVE YOU ACTUALLY HAD ANY THOUGHTS OF KILLING YOURSELF IN THE PAST MONTH?: NO

## 2025-03-25 NOTE — ED TRIAGE NOTES
Pancreatitis hx drank for 10 days, going through withdrawals. Pt tremulous nv , can't keep fluids or food down, last drink Sunday 11pm. 12 pack a day plus shots while on vacation.hx of withdrawal seizures, can't tell if blood in vomit due to red gatorade. 6/10 ab pain.

## 2025-03-25 NOTE — ED PROVIDER NOTES
Emergency Department Note      History of Present Illness     Chief Complaint   Withdrawal    HPI   Dariusz Ferreira is a 44 year old male with a history as noted below who presents to the emergency department for withdrawal. The patient states that he has a hx of alcohol abuse, alcohol-induced pancreatitis, and withdrawal seizure. He reports that for the past 10 days, he vacationed in [place], [place] in which he drank a 12 pack of beer per day, noting that his last drink was at 2300 on Sunday night. He reports that since he returned from vacation, he has been experiencing tremors, epigastric pain, nausea, and vomiting. He denies any seizure activity prior to arrival.    Independent Historian   None    Review of External Notes   I reviewed a clinic note from 18 March 2025 regarding treatment for cellulitis    Past Medical History     Medical History and Problem List   ADHD  ETOH abuse  ETOH-induced pancreatitis  Anxiety  Depression  Hypertension  Asthma    Medications   augmented betamethasone dipropionate (DIPROLENE-AF) 0.05 % external ointment  fluvoxaMINE (LUVOX) 100 MG tablet  hydrOXYzine (ATARAX) 50 MG tablet  lamoTRIgine (LAMICTAL) 25 MG tablet  tadalafil (CIALIS) 20 MG tablet  valACYclovir (VALTREX) 1000 mg tablet    Surgical History   Mucous cyst removed from tongue  Tonsillectomy    Physical Exam     Patient Vitals for the past 24 hrs:   BP Temp Temp src Pulse Resp SpO2 Height Weight   03/25/25 1100 136/88 -- -- 102 -- 99 % -- --   03/25/25 1045 (!) 150/92 -- -- -- -- 99 % -- --   03/25/25 1030 (!) 157/112 -- -- 110 -- 98 % -- --   03/25/25 1010 (!) 147/95 -- -- 112 -- 99 % -- --   03/25/25 0955 (!) 148/91 -- -- 105 -- -- -- --   03/25/25 0940 (!) 141/100 -- -- 105 -- 97 % -- --   03/25/25 0925 (!) 136/97 -- -- 98 -- 96 % -- --   03/25/25 0912 (!) 134/103 -- -- 102 -- 97 % -- --   03/25/25 0857 (!) 140/100 -- -- 98 -- 99 % -- --   03/25/25 0827 (!) 134/98 -- -- 98 -- 98 % -- --   03/25/25 0812 (!) 138/95 --  "-- 94 -- 98 % -- --   03/25/25 0757 (!) 130/94 -- -- 98 -- 97 % -- --   03/25/25 0742 (!) 126/93 -- -- 98 -- 100 % -- --   03/25/25 0727 132/89 -- -- 95 -- 100 % -- --   03/25/25 0712 (!) 117/93 -- -- 92 -- 99 % -- --   03/25/25 0708 -- -- -- 93 -- 99 % -- --   03/25/25 0702 -- -- -- 93 -- 100 % -- --   03/25/25 0701 -- -- -- 90 -- 99 % -- --   03/25/25 0700 (!) 138/91 -- -- 92 -- -- -- --   03/25/25 0648 -- -- -- 96 -- 98 % -- --   03/25/25 0647 -- -- -- 95 -- 99 % -- --   03/25/25 0646 -- -- -- 93 -- 99 % -- --   03/25/25 0645 (!) 131/96 -- -- 96 -- 100 % -- --   03/25/25 0622 -- -- -- 102 -- 100 % -- --   03/25/25 0621 -- -- -- 105 -- 100 % -- --   03/25/25 0620 -- -- -- 103 -- 100 % -- --   03/25/25 0619 -- -- -- 102 -- 100 % -- --   03/25/25 0618 -- -- -- 109 -- 100 % -- --   03/25/25 0617 -- -- -- 106 -- 100 % -- --   03/25/25 0616 -- -- -- 105 -- 100 % -- --   03/25/25 0615 (!) 134/99 -- -- 113 -- 100 % -- --   03/25/25 0614 -- -- -- 105 -- 99 % -- --   03/25/25 0612 -- -- -- 110 -- 100 % -- --   03/25/25 0611 -- -- -- 107 -- 100 % -- --   03/25/25 0606 -- -- -- 106 -- 100 % -- --   03/25/25 0556 (!) 148/101 -- -- -- -- 100 % -- --   03/25/25 0547 (!) 191/123 98.3  F (36.8  C) Oral (!) 133 20 100 % 1.727 m (5' 8\") 72.4 kg (159 lb 9.8 oz)     Physical Exam  General: Tremulous. Alert, No distress. Nontoxic appearance  Head: No signs of trauma.   Mouth/Throat: Dry mucous membranes.  Eyes: Conjunctivae are normal. Pupils are equal..   Neck: Normal range of motion.    CV: Tachycardic. Appears well perfused.  Resp: No respiratory distress.   GI: Epigastric pain.   MSK: Normal range of motion. No obvious deformity.   Neuro: The patient is alert and interactive. RAMIREZ. Speech normal. GCS 15  Skin: No lesion or sign of trauma noted.   Psych: normal mood and affect. behavior is normal.     Diagnostics     Lab Results   Labs Ordered and Resulted from Time of ED Arrival to Time of ED Departure   COMPREHENSIVE METABOLIC " PANEL - Abnormal       Result Value    Sodium 134 (*)     Potassium 3.7      Carbon Dioxide (CO2) 22      Anion Gap 21 (*)     Urea Nitrogen 9.4      Creatinine 0.83      GFR Estimate >90      Calcium 10.3      Chloride 91 (*)     Glucose 112 (*)     Alkaline Phosphatase 81      AST 30      ALT 35      Protein Total 8.4 (*)     Albumin 5.1      Bilirubin Total 1.0     PHOSPHORUS - Abnormal    Phosphorus 1.6 (*)    LIPASE - Abnormal    Lipase 243 (*)    INR - Normal    INR 0.91     MAGNESIUM - Normal    Magnesium 2.3     LACTIC ACID WHOLE BLOOD WITH 1X REPEAT IN 2 HR WHEN >2 - Normal    Lactic Acid, Initial 1.3     ETHYL ALCOHOL LEVEL - Normal    Alcohol ethyl <0.01     CBC WITH PLATELETS AND DIFFERENTIAL    WBC Count 9.7      RBC Count 5.57      Hemoglobin 16.7      Hematocrit 48.5      MCV 87      MCH 30.0      MCHC 34.4      RDW 13.6      Platelet Count 446      % Neutrophils 82      % Lymphocytes 9      % Monocytes 9      % Eosinophils 0      % Basophils 0      % Immature Granulocytes 1      NRBCs per 100 WBC 0      Absolute Neutrophils 7.9      Absolute Lymphocytes 0.8      Absolute Monocytes 0.8      Absolute Eosinophils 0.0      Absolute Basophils 0.0      Absolute Immature Granulocytes 0.1      Absolute NRBCs 0.0     TYPE AND SCREEN, ADULT    ABO/RH(D) A NEG      Antibody Screen Negative      SPECIMEN EXPIRATION DATE [id number]     ABO/RH TYPE AND SCREEN     Imaging   None    EKG   ECG results from 03/25/25   EKG 12-lead, tracing only     Value    Systolic Blood Pressure     Diastolic Blood Pressure     Ventricular Rate 119    Atrial Rate 119    IN Interval 144    QRS Duration 76        QTc 447    P Axis 60    R AXIS 39    T Axis 82    Interpretation ECG      Sinus tachycardia  Otherwise normal ECG  When compared with ECG of 04-Jul-2023 05:34,  No significant change was found  Confirmed by - EMERGENCY ROOM, PHYSICIAN ([id number]),  DANNY GOODSON ([id number]) on 3/25/2025 7:13:57 AM       Independent  Interpretation   None    ED Course      Medications Administered   Medications   LORazepam (ATIVAN) injection 0.5 mg (0.5 mg Intravenous $Given 3/25/25 0834)   sodium chloride 0.9% BOLUS 1,000 mL (0 mLs Intravenous Stopped 3/25/25 0927)   sodium chloride 0.9% BOLUS 1,000 mL (0 mLs Intravenous Stopped 3/25/25 0927)   ondansetron (ZOFRAN) injection 4 mg (4 mg Intravenous $Given 3/25/25 0637)   LORazepam (ATIVAN) injection 1 mg (1 mg Intravenous $Given 3/25/25 0636)   alum & mag hydroxide-simethicone (MAALOX) suspension 30 mL (30 mLs Oral $Given 3/25/25 0834)   lidocaine (viscous) (XYLOCAINE) 2 % solution 10 mL (10 mLs Mouth/Throat $Given 3/25/25 0834)   diazepam (VALIUM) tablet 10 mg (10 mg Oral $Given 3/25/25 1155)     Discussion of Management   None    ED Course   ED Course as of 03/25/25 1310   Tue Mar 25, 2025   0630 I obtained history and examined the patient as noted above.        Additional Documentation  None    Medical Decision Making / Diagnosis     CMS Diagnoses: None    MIPS   None    MDM   Dariusz Ferreira is a 44 year old male with a history of alcohol abuse presents to the ED after drinking heavily over the last 10 days.  The patient is concerned that he may have pancreatitis but his lipase is only mildly elevated.  Instead I believe his symptoms are more likely secondary to gastritis/esophagitis due to the constant alcohol intake.  Patient is having mild withdrawal symptoms.  His tremulousness seems to increase when we walk in the room and discuss his treatment.  Patient is declining transfer to detox.  He will be given a prescription Zofran and discharged home he will return to the ER if worsening symptoms.  He was told to use OTC PPI and Maalox as needed.    Disposition   The patient was discharged.     Diagnosis     ICD-10-CM    1. Alcohol abuse  F10.10       2. Acute alcoholic gastritis without hemorrhage  K29.20       3. Alcohol withdrawal syndrome without complication (H)  F10.930           Discharge Medications   Discharge Medication List as of 3/25/2025 11:47 AM        START taking these medications    Details   ondansetron (ZOFRAN ODT) 4 MG ODT tab Take 1 tablet (4 mg) by mouth every 8 hours as needed for nausea., Disp-15 tablet, R-0, Local Print           Scribe Disclosure:  FRACISCO, Kojo Joy, am serving as a scribe at 6:04 AM on 3/25/2025 to document services personally performed by Yazan Up MD based on my observations and the provider's statements to me.        Yazan Up MD  03/25/25 [id number]

## 2025-06-14 ENCOUNTER — HEALTH MAINTENANCE LETTER (OUTPATIENT)
Age: 45
End: 2025-06-14